# Patient Record
Sex: FEMALE | Race: BLACK OR AFRICAN AMERICAN | NOT HISPANIC OR LATINO | Employment: OTHER | URBAN - METROPOLITAN AREA
[De-identification: names, ages, dates, MRNs, and addresses within clinical notes are randomized per-mention and may not be internally consistent; named-entity substitution may affect disease eponyms.]

---

## 2023-10-12 DIAGNOSIS — R06.2 WHEEZING: Primary | ICD-10-CM

## 2023-10-12 RX ORDER — BUDESONIDE AND FORMOTEROL FUMARATE DIHYDRATE 160; 4.5 UG/1; UG/1
1-2 AEROSOL RESPIRATORY (INHALATION) 2 TIMES DAILY
Qty: 10.2 EACH | Refills: 0 | Status: SHIPPED | OUTPATIENT
Start: 2023-10-12 | End: 2023-10-16 | Stop reason: SDUPTHER

## 2023-10-16 ENCOUNTER — TELEMEDICINE (OUTPATIENT)
Dept: PRIMARY CARE | Facility: CLINIC | Age: 68
End: 2023-10-16
Payer: MEDICARE

## 2023-10-16 DIAGNOSIS — R06.2 WHEEZING: ICD-10-CM

## 2023-10-16 DIAGNOSIS — R73.01 IFG (IMPAIRED FASTING GLUCOSE): ICD-10-CM

## 2023-10-16 DIAGNOSIS — Z23 NEED FOR ZOSTER VACCINE: ICD-10-CM

## 2023-10-16 DIAGNOSIS — Z00.00 ANNUAL PHYSICAL EXAM: ICD-10-CM

## 2023-10-16 DIAGNOSIS — E55.9 HYPOVITAMINOSIS D: ICD-10-CM

## 2023-10-16 DIAGNOSIS — Z78.0 POST-MENOPAUSAL: ICD-10-CM

## 2023-10-16 DIAGNOSIS — J44.9 CHRONIC OBSTRUCTIVE PULMONARY DISEASE, UNSPECIFIED COPD TYPE (MULTI): ICD-10-CM

## 2023-10-16 DIAGNOSIS — I1A.0 RESISTANT HYPERTENSION: ICD-10-CM

## 2023-10-16 DIAGNOSIS — E78.5 HYPERLIPIDEMIA, UNSPECIFIED HYPERLIPIDEMIA TYPE: ICD-10-CM

## 2023-10-16 DIAGNOSIS — I25.10 ASHD (ARTERIOSCLEROTIC HEART DISEASE): Primary | ICD-10-CM

## 2023-10-16 DIAGNOSIS — E11.9 TYPE 2 DIABETES MELLITUS WITHOUT COMPLICATION, WITHOUT LONG-TERM CURRENT USE OF INSULIN (MULTI): ICD-10-CM

## 2023-10-16 DIAGNOSIS — I70.219 EXTREMITY ATHEROSCLEROSIS WITH INTERMITTENT CLAUDICATION (CMS-HCC): ICD-10-CM

## 2023-10-16 PROBLEM — E03.9 HYPOTHYROIDISM: Status: ACTIVE | Noted: 2023-10-16

## 2023-10-16 PROBLEM — M81.0 AGE RELATED OSTEOPOROSIS: Status: ACTIVE | Noted: 2023-10-16

## 2023-10-16 PROBLEM — I10 HYPERTENSION: Status: ACTIVE | Noted: 2023-10-16

## 2023-10-16 PROBLEM — I73.9 PERIPHERAL VASCULAR DISEASE (CMS-HCC): Status: ACTIVE | Noted: 2023-10-16

## 2023-10-16 PROCEDURE — 99214 OFFICE O/P EST MOD 30 MIN: CPT | Performed by: INTERNAL MEDICINE

## 2023-10-16 RX ORDER — CHOLECALCIFEROL (VITAMIN D3) 50 MCG
50 TABLET ORAL DAILY
COMMUNITY

## 2023-10-16 RX ORDER — METOPROLOL TARTRATE 100 MG/1
50 TABLET ORAL 2 TIMES DAILY
COMMUNITY
End: 2023-11-21

## 2023-10-16 RX ORDER — CHLORHEXIDINE GLUCONATE ORAL RINSE 1.2 MG/ML
SOLUTION DENTAL
COMMUNITY
Start: 2023-09-05 | End: 2024-03-29 | Stop reason: ENTERED-IN-ERROR

## 2023-10-16 RX ORDER — DOCUSATE SODIUM 100 MG/1
1 CAPSULE, LIQUID FILLED ORAL DAILY
COMMUNITY
Start: 2013-12-19 | End: 2024-01-16

## 2023-10-16 RX ORDER — IBUPROFEN 800 MG/1
800 TABLET ORAL EVERY 8 HOURS PRN
COMMUNITY
Start: 2023-09-05 | End: 2024-01-16

## 2023-10-16 RX ORDER — CLOPIDOGREL BISULFATE 75 MG/1
1 TABLET ORAL DAILY
COMMUNITY
Start: 2016-08-30 | End: 2024-01-15 | Stop reason: ALTCHOICE

## 2023-10-16 RX ORDER — IPRATROPIUM BROMIDE AND ALBUTEROL SULFATE 2.5; .5 MG/3ML; MG/3ML
3 SOLUTION RESPIRATORY (INHALATION) 4 TIMES DAILY PRN
COMMUNITY
Start: 2017-07-24 | End: 2024-04-04 | Stop reason: HOSPADM

## 2023-10-16 RX ORDER — EZETIMIBE 10 MG/1
1 TABLET ORAL NIGHTLY
COMMUNITY
Start: 2022-03-07 | End: 2024-01-15 | Stop reason: ENTERED-IN-ERROR

## 2023-10-16 RX ORDER — ALBUTEROL SULFATE 90 UG/1
1 AEROSOL, METERED RESPIRATORY (INHALATION) EVERY 6 HOURS PRN
COMMUNITY
End: 2023-10-16 | Stop reason: SDUPTHER

## 2023-10-16 RX ORDER — NITROGLYCERIN 0.4 MG/1
0.4 TABLET SUBLINGUAL EVERY 5 MIN PRN
COMMUNITY
Start: 2014-02-20 | End: 2024-01-16

## 2023-10-16 RX ORDER — BUDESONIDE AND FORMOTEROL FUMARATE DIHYDRATE 160; 4.5 UG/1; UG/1
1-2 AEROSOL RESPIRATORY (INHALATION) 2 TIMES DAILY
Qty: 10.2 EACH | Refills: 0 | Status: SHIPPED | OUTPATIENT
Start: 2023-10-16 | End: 2023-12-05

## 2023-10-16 RX ORDER — ALBUTEROL SULFATE 90 UG/1
1 AEROSOL, METERED RESPIRATORY (INHALATION) EVERY 6 HOURS PRN
Qty: 18 G | Refills: 2 | Status: SHIPPED | OUTPATIENT
Start: 2023-10-16

## 2023-10-16 RX ORDER — LOSARTAN POTASSIUM 50 MG/1
75 TABLET ORAL DAILY
COMMUNITY
End: 2024-01-25 | Stop reason: HOSPADM

## 2023-10-16 ASSESSMENT — ENCOUNTER SYMPTOMS
CARDIOVASCULAR NEGATIVE: 1
GASTROINTESTINAL NEGATIVE: 1
RESPIRATORY NEGATIVE: 1
CONSTITUTIONAL NEGATIVE: 1

## 2023-10-16 NOTE — PROGRESS NOTES
Subjective   Patient ID: Yesy Ayala is a 68 y.o. female who presents for Follow-up.  HPI    Review of Systems   Constitutional: Negative.    Respiratory: Negative.     Cardiovascular: Negative.    Gastrointestinal: Negative.        Objective   Physical Exam  Neurological:      Mental Status: She is alert.   Psychiatric:         Mood and Affect: Mood normal.         Assessment/Plan   Problem List Items Addressed This Visit       ASHD (arteriosclerotic heart disease) - Primary    Relevant Medications    clopidogrel (Plavix) 75 mg tablet    metoprolol tartrate (Lopressor) 100 mg tablet    nitroglycerin (Nitrostat) 0.4 mg SL tablet    albuterol 90 mcg/actuation inhaler    Other Relevant Orders    CBC    Comprehensive Metabolic Panel    TSH with reflex to Free T4 if abnormal    Hemoglobin A1C    Lipid Panel    Vitamin D 25-Hydroxy,Total (for eval of Vitamin D levels)    Albumin, urine, random    COPD (chronic obstructive pulmonary disease) (CMS/[facility])    Diabetes mellitus (CMS/[facility])    Extremity atherosclerosis with intermittent claudication (CMS/[facility])    Hyperlipidemia    Relevant Medications    albuterol 90 mcg/actuation inhaler    Other Relevant Orders    CBC    Comprehensive Metabolic Panel    TSH with reflex to Free T4 if abnormal    Hemoglobin A1C    Lipid Panel    Vitamin D 25-Hydroxy,Total (for eval of Vitamin D levels)    Albumin, urine, random    Hypertension    Relevant Medications    albuterol 90 mcg/actuation inhaler    Other Relevant Orders    CBC    Comprehensive Metabolic Panel    TSH with reflex to Free T4 if abnormal    Hemoglobin A1C    Lipid Panel    Vitamin D 25-Hydroxy,Total (for eval of Vitamin D levels)    Albumin, urine, random     Other Visit Diagnoses       Wheezing        Relevant Medications    budesonide-formoteroL (Symbicort) 160-4.5 mcg/actuation inhaler    albuterol 90 mcg/actuation inhaler    Other Relevant Orders    CBC    Comprehensive Metabolic Panel    TSH with reflex to Free T4 if  abnormal    Hemoglobin A1C    Lipid Panel    Vitamin D 25-Hydroxy,Total (for eval of Vitamin D levels)    Albumin, urine, random    IFG (impaired fasting glucose)        Relevant Orders    Hemoglobin A1C    Referral to Ophthalmology    Hypovitaminosis D        Relevant Orders    Vitamin D 25-Hydroxy,Total (for eval of Vitamin D levels)    Annual physical exam        Relevant Orders    Colonoscopy Screening    Hepatitis C antibody    XR DEXA bone density    Post-menopausal        Relevant Orders    XR DEXA bone density    Need for zoster vaccine              The patient is here for a follow up on chronic medical problems.  His medications were reviewed, pharmacy updated, notes reviewed, prior labs reviewed.      COPD  Stable  On Symbicort and Albuterol  Refills sent    DM  Due for repeat A1C  Not on any meds  Podiatry referral and opth referral given    PAD  Asymptomatic now    Hld  Check statins    Due for wellness    An interactive audio and video telecommunication system which permits real time communications between the patient (at the originating site) and provider (at the distant site) was utilized to provide this telehealth service.    Verbal consent was requested and obtained from the patient on the day of encounter.         Cydney Moore MD

## 2023-11-02 ENCOUNTER — LAB (OUTPATIENT)
Dept: LAB | Facility: LAB | Age: 68
End: 2023-11-02
Payer: MEDICARE

## 2023-11-02 DIAGNOSIS — I25.10 ASHD (ARTERIOSCLEROTIC HEART DISEASE): ICD-10-CM

## 2023-11-02 DIAGNOSIS — Z00.00 ANNUAL PHYSICAL EXAM: ICD-10-CM

## 2023-11-02 DIAGNOSIS — E55.9 HYPOVITAMINOSIS D: ICD-10-CM

## 2023-11-02 DIAGNOSIS — R06.2 WHEEZING: ICD-10-CM

## 2023-11-02 DIAGNOSIS — R73.01 IFG (IMPAIRED FASTING GLUCOSE): ICD-10-CM

## 2023-11-02 DIAGNOSIS — E78.5 HYPERLIPIDEMIA, UNSPECIFIED HYPERLIPIDEMIA TYPE: ICD-10-CM

## 2023-11-02 DIAGNOSIS — I1A.0 RESISTANT HYPERTENSION: ICD-10-CM

## 2023-11-02 DIAGNOSIS — D64.9 ANEMIA, UNSPECIFIED TYPE: ICD-10-CM

## 2023-11-02 LAB
ALBUMIN SERPL BCP-MCNC: 3.6 G/DL (ref 3.4–5)
ALP SERPL-CCNC: 78 U/L (ref 33–136)
ALT SERPL W P-5'-P-CCNC: 11 U/L (ref 7–45)
ANION GAP SERPL CALC-SCNC: 12 MMOL/L (ref 10–20)
AST SERPL W P-5'-P-CCNC: 11 U/L (ref 9–39)
BILIRUB SERPL-MCNC: 0.6 MG/DL (ref 0–1.2)
BUN SERPL-MCNC: 11 MG/DL (ref 6–23)
CALCIUM SERPL-MCNC: 8.6 MG/DL (ref 8.6–10.3)
CHLORIDE SERPL-SCNC: 105 MMOL/L (ref 98–107)
CHOLEST SERPL-MCNC: 114 MG/DL (ref 0–199)
CHOLESTEROL/HDL RATIO: 2.6
CO2 SERPL-SCNC: 25 MMOL/L (ref 21–32)
CREAT SERPL-MCNC: 1.35 MG/DL (ref 0.5–1.05)
ERYTHROCYTE [DISTWIDTH] IN BLOOD BY AUTOMATED COUNT: 19.7 % (ref 11.5–14.5)
GFR SERPL CREATININE-BSD FRML MDRD: 43 ML/MIN/1.73M*2
GLUCOSE SERPL-MCNC: 223 MG/DL (ref 74–99)
HCT VFR BLD AUTO: 39.1 % (ref 36–46)
HDLC SERPL-MCNC: 43.8 MG/DL
HGB BLD-MCNC: 12.4 G/DL (ref 12–16)
LDLC SERPL CALC-MCNC: 50 MG/DL
MCH RBC QN AUTO: 21.2 PG (ref 26–34)
MCHC RBC AUTO-ENTMCNC: 31.7 G/DL (ref 32–36)
MCV RBC AUTO: 67 FL (ref 80–100)
NON HDL CHOLESTEROL: 70 MG/DL (ref 0–149)
NRBC BLD-RTO: 0 /100 WBCS (ref 0–0)
PLATELET # BLD AUTO: 112 X10*3/UL (ref 150–450)
POTASSIUM SERPL-SCNC: 4.2 MMOL/L (ref 3.5–5.3)
PROT SERPL-MCNC: 6.1 G/DL (ref 6.4–8.2)
RBC # BLD AUTO: 5.86 X10*6/UL (ref 4–5.2)
SODIUM SERPL-SCNC: 138 MMOL/L (ref 136–145)
TRIGL SERPL-MCNC: 103 MG/DL (ref 0–149)
TSH SERPL-ACNC: 2.93 MIU/L (ref 0.44–3.98)
VLDL: 21 MG/DL (ref 0–40)
WBC # BLD AUTO: 8.7 X10*3/UL (ref 4.4–11.3)

## 2023-11-02 PROCEDURE — 82306 VITAMIN D 25 HYDROXY: CPT

## 2023-11-02 PROCEDURE — 36415 COLL VENOUS BLD VENIPUNCTURE: CPT

## 2023-11-02 PROCEDURE — 80061 LIPID PANEL: CPT

## 2023-11-02 PROCEDURE — 80053 COMPREHEN METABOLIC PANEL: CPT

## 2023-11-02 PROCEDURE — 86803 HEPATITIS C AB TEST: CPT

## 2023-11-02 PROCEDURE — 82607 VITAMIN B-12: CPT

## 2023-11-02 PROCEDURE — 82728 ASSAY OF FERRITIN: CPT

## 2023-11-02 PROCEDURE — 85027 COMPLETE CBC AUTOMATED: CPT

## 2023-11-02 PROCEDURE — 83036 HEMOGLOBIN GLYCOSYLATED A1C: CPT

## 2023-11-02 PROCEDURE — 84443 ASSAY THYROID STIM HORMONE: CPT

## 2023-11-03 LAB
25(OH)D3 SERPL-MCNC: 29 NG/ML (ref 30–100)
CREAT UR-MCNC: 47.1 MG/DL (ref 20–320)
EST. AVERAGE GLUCOSE BLD GHB EST-MCNC: 220 MG/DL
HBA1C MFR BLD: 9.3 %
HCV AB SER QL: NONREACTIVE
MICROALBUMIN UR-MCNC: 57.7 MG/L
MICROALBUMIN/CREAT UR: 122.5 UG/MG CREAT

## 2023-11-03 PROCEDURE — 82043 UR ALBUMIN QUANTITATIVE: CPT

## 2023-11-03 PROCEDURE — 82570 ASSAY OF URINE CREATININE: CPT

## 2023-11-06 ENCOUNTER — OFFICE VISIT (OUTPATIENT)
Dept: PRIMARY CARE | Facility: CLINIC | Age: 68
End: 2023-11-06
Payer: MEDICARE

## 2023-11-06 VITALS — WEIGHT: 126 LBS | DIASTOLIC BLOOD PRESSURE: 70 MMHG | SYSTOLIC BLOOD PRESSURE: 128 MMHG | BODY MASS INDEX: 26.33 KG/M2

## 2023-11-06 DIAGNOSIS — E11.22 TYPE 2 DIABETES MELLITUS WITH STAGE 3A CHRONIC KIDNEY DISEASE, WITHOUT LONG-TERM CURRENT USE OF INSULIN (MULTI): Primary | ICD-10-CM

## 2023-11-06 DIAGNOSIS — I1A.0 RESISTANT HYPERTENSION: ICD-10-CM

## 2023-11-06 DIAGNOSIS — E55.9 VITAMIN D DEFICIENCY: ICD-10-CM

## 2023-11-06 DIAGNOSIS — F17.200 NICOTINE USE DISORDER: ICD-10-CM

## 2023-11-06 DIAGNOSIS — N18.31 TYPE 2 DIABETES MELLITUS WITH STAGE 3A CHRONIC KIDNEY DISEASE, WITHOUT LONG-TERM CURRENT USE OF INSULIN (MULTI): Primary | ICD-10-CM

## 2023-11-06 DIAGNOSIS — E78.5 HYPERLIPIDEMIA, UNSPECIFIED HYPERLIPIDEMIA TYPE: ICD-10-CM

## 2023-11-06 DIAGNOSIS — N18.30 STAGE 3 CHRONIC KIDNEY DISEASE, UNSPECIFIED WHETHER STAGE 3A OR 3B CKD (MULTI): ICD-10-CM

## 2023-11-06 DIAGNOSIS — D64.9 ANEMIA, UNSPECIFIED TYPE: ICD-10-CM

## 2023-11-06 LAB
FERRITIN SERPL-MCNC: 104 NG/ML (ref 8–150)
VIT B12 SERPL-MCNC: 263 PG/ML (ref 211–911)

## 2023-11-06 PROCEDURE — 3048F LDL-C <100 MG/DL: CPT | Performed by: INTERNAL MEDICINE

## 2023-11-06 PROCEDURE — 1159F MED LIST DOCD IN RCRD: CPT | Performed by: INTERNAL MEDICINE

## 2023-11-06 PROCEDURE — 1036F TOBACCO NON-USER: CPT | Performed by: INTERNAL MEDICINE

## 2023-11-06 PROCEDURE — 3074F SYST BP LT 130 MM HG: CPT | Performed by: INTERNAL MEDICINE

## 2023-11-06 PROCEDURE — 2028F FOOT EXAM PERFORMED: CPT | Performed by: INTERNAL MEDICINE

## 2023-11-06 PROCEDURE — G0439 PPPS, SUBSEQ VISIT: HCPCS | Performed by: INTERNAL MEDICINE

## 2023-11-06 PROCEDURE — 1170F FXNL STATUS ASSESSED: CPT | Performed by: INTERNAL MEDICINE

## 2023-11-06 PROCEDURE — 3078F DIAST BP <80 MM HG: CPT | Performed by: INTERNAL MEDICINE

## 2023-11-06 PROCEDURE — 1160F RVW MEDS BY RX/DR IN RCRD: CPT | Performed by: INTERNAL MEDICINE

## 2023-11-06 PROCEDURE — 3046F HEMOGLOBIN A1C LEVEL >9.0%: CPT | Performed by: INTERNAL MEDICINE

## 2023-11-06 PROCEDURE — 4010F ACE/ARB THERAPY RXD/TAKEN: CPT | Performed by: INTERNAL MEDICINE

## 2023-11-06 PROCEDURE — 99214 OFFICE O/P EST MOD 30 MIN: CPT | Performed by: INTERNAL MEDICINE

## 2023-11-06 PROCEDURE — 3060F POS MICROALBUMINURIA REV: CPT | Performed by: INTERNAL MEDICINE

## 2023-11-06 RX ORDER — IBUPROFEN 200 MG
1 TABLET ORAL EVERY 24 HOURS
Qty: 30 PATCH | Refills: 0 | Status: SHIPPED | OUTPATIENT
Start: 2023-11-06 | End: 2024-01-25 | Stop reason: HOSPADM

## 2023-11-06 ASSESSMENT — ACTIVITIES OF DAILY LIVING (ADL)
TAKING_MEDICATION: INDEPENDENT
DRESSING: INDEPENDENT
GROCERY_SHOPPING: INDEPENDENT
BATHING: INDEPENDENT
MANAGING_FINANCES: INDEPENDENT
DOING_HOUSEWORK: INDEPENDENT

## 2023-11-06 ASSESSMENT — LIFESTYLE VARIABLES
HOW MANY STANDARD DRINKS CONTAINING ALCOHOL DO YOU HAVE ON A TYPICAL DAY: PATIENT DOES NOT DRINK
HOW OFTEN DO YOU HAVE SIX OR MORE DRINKS ON ONE OCCASION: NEVER
SKIP TO QUESTIONS 9-10: 1
AUDIT-C TOTAL SCORE: 0
HOW OFTEN DO YOU HAVE A DRINK CONTAINING ALCOHOL: NEVER

## 2023-11-06 ASSESSMENT — ENCOUNTER SYMPTOMS
DEPRESSION: 0
LOSS OF SENSATION IN FEET: 0
OCCASIONAL FEELINGS OF UNSTEADINESS: 0

## 2023-11-06 ASSESSMENT — PATIENT HEALTH QUESTIONNAIRE - PHQ9
1. LITTLE INTEREST OR PLEASURE IN DOING THINGS: NOT AT ALL
SUM OF ALL RESPONSES TO PHQ9 QUESTIONS 1 AND 2: 0
1. LITTLE INTEREST OR PLEASURE IN DOING THINGS: NOT AT ALL
2. FEELING DOWN, DEPRESSED OR HOPELESS: NOT AT ALL
2. FEELING DOWN, DEPRESSED OR HOPELESS: NOT AT ALL
SUM OF ALL RESPONSES TO PHQ9 QUESTIONS 1 AND 2: 0

## 2023-11-06 NOTE — PROGRESS NOTES
.Subjective   Patient ID: Yesy Ayala is a 68 y.o. female who presents for Follow-up.    HPI     Review of Systems    Objective   /70   Wt 57.2 kg (126 lb)   BMI 26.33 kg/m²     Physical Exam    Assessment/Plan   {Assess/PlanSmartLinks:83005}

## 2023-11-06 NOTE — PROGRESS NOTES
Medicare Wellness Billing Compliance Satisfied    *This is a visual tool to show completion of required items on the day of the visit. Green checks will only appear on the date of visit.    Review all medications by prescribing practitioner or clinical pharmacist (such as prescriptions, OTCs, herbal therapies and supplements) documented in the medical record    Past Medical, Surgical, and Family History reviewed and updated in chart    Tobacco Use Reviewed    Alcohol Use Reviewed    Illicit Drug Use Reviewed    PHQ2/9    Falls in Last Year Reviewed    Home Safety Risk Factors Reviewed    Cognitive Impairment Reviewed    Patient Self Assessment and Health Status    Current Diet Reviewed    Exercise Frequency    ADL - Hearing Impairment    ADL - Bathing    ADL - Dressing    ADL - Walks in Home    IADL - Managing Finances    IADL - Grocery Shopping    IADL - Taking Medications    IADL - Doing Housework    Subjective   Patient ID: Yesy Ayala is a 68 y.o. female who presents for Follow-up.    HPI   68-year-old female with past medical history of hypertension, coronary artery disease, PVD s/p stent placement, who presented to the office for Medicare wellness visit.  Patient reported that she has been in a good health since her last visit she denies any, recent illness, hospitalization, or any new complaints, she denies any hematuria, or blood with her stool, she also denies being diagnosed with thalassemia in the past.  He is a still active smoker used to smoke 1 pack/day but now smokes 1 pack every 3 to 4 days she initially consider Chantix but was worried about the side effects which include hallucination especially that she lives alone at home, but now she is willing to try nicotine patches.  She is planning to obtain her colonoscopy and she is up-to-date with her other requirement    Review of Systems   All other systems reviewed and are negative.      Objective   /70   Wt 57.2 kg (126 lb)    BMI 26.33 kg/m²     Physical Exam  Vitals and nursing note reviewed.   Constitutional:       Appearance: Normal appearance. She is normal weight.   HENT:      Head: Normocephalic and atraumatic.      Right Ear: Tympanic membrane normal.      Nose: Nose normal.      Mouth/Throat:      Mouth: Mucous membranes are moist.   Eyes:      Extraocular Movements: Extraocular movements intact.   Cardiovascular:      Rate and Rhythm: Normal rate.      Pulses: Normal pulses.      Heart sounds: Normal heart sounds.   Pulmonary:      Effort: Pulmonary effort is normal.      Breath sounds: Normal breath sounds.   Abdominal:      General: Abdomen is flat. Bowel sounds are normal.      Palpations: Abdomen is soft.   Musculoskeletal:         General: Normal range of motion.   Skin:     General: Skin is warm and dry.      Capillary Refill: Capillary refill takes less than 2 seconds.   Neurological:      General: No focal deficit present.      Mental Status: She is alert and oriented to person, place, and time.   Psychiatric:         Mood and Affect: Mood normal.         Behavior: Behavior normal.         Thought Content: Thought content normal.         Judgment: Judgment normal.         Assessment/Plan   Diagnoses and all orders for this visit:  Type 2 diabetes mellitus with stage 3a chronic kidney disease, without long-term current use of insulin (CMS/[facility])  -     empagliflozin (Jardiance) 10 mg; Take 1 tablet (10 mg) by mouth once daily.  -     Referral to Nephrology; Future  Resistant hypertension  Hyperlipidemia, unspecified hyperlipidemia type  Vitamin D deficiency  Nicotine use disorder  -     nicotine (Nicoderm CQ) 21 mg/24 hr patch; Place 1 patch over 24 hours on the skin once every 24 hours.  Anemia, unspecified type r/o underlying thalassemia   -     Iron and TIBC; Future  -     Ferritin; Future  -     Vitamin B12; Future  -     Copper, serum; Future  Stage 3 chronic kidney disease, unspecified whether stage 3a or 3b CKD  (CMS/HCC)  -     Iron and TIBC; Future  -     Referral to Nephrology; Future

## 2023-11-06 NOTE — PROGRESS NOTES
I saw and evaluated the patient. I personally obtained the key and critical portions of the history and physical exam or was physically present for key and critical portions performed by the resident/fellow. I reviewed the resident/fellow's documentation and discussed the patient with the resident/fellow. I agree with the resident/fellow's medical decision making as documented in the note.    Cydney Moore MD

## 2023-11-20 DIAGNOSIS — I25.10 ATHEROSCLEROTIC HEART DISEASE OF NATIVE CORONARY ARTERY WITHOUT ANGINA PECTORIS: ICD-10-CM

## 2023-11-20 DIAGNOSIS — E78.5 HYPERLIPIDEMIA, UNSPECIFIED: Primary | ICD-10-CM

## 2023-11-21 RX ORDER — METOPROLOL TARTRATE 100 MG/1
50 TABLET ORAL 2 TIMES DAILY
Qty: 45 TABLET | Refills: 3 | Status: SHIPPED | OUTPATIENT
Start: 2023-11-21

## 2023-11-21 RX ORDER — ROSUVASTATIN CALCIUM 40 MG/1
40 TABLET, COATED ORAL NIGHTLY
COMMUNITY
End: 2024-01-16

## 2023-11-21 RX ORDER — ROSUVASTATIN CALCIUM 40 MG/1
40 TABLET, COATED ORAL NIGHTLY
Qty: 90 TABLET | Refills: 3 | Status: SHIPPED | OUTPATIENT
Start: 2023-11-21 | End: 2024-04-04 | Stop reason: HOSPADM

## 2023-11-30 ENCOUNTER — APPOINTMENT (OUTPATIENT)
Dept: PRIMARY CARE | Facility: CLINIC | Age: 68
End: 2023-11-30

## 2023-12-05 DIAGNOSIS — R06.2 WHEEZING: ICD-10-CM

## 2023-12-05 RX ORDER — BUDESONIDE AND FORMOTEROL FUMARATE DIHYDRATE 160; 4.5 UG/1; UG/1
AEROSOL RESPIRATORY (INHALATION)
Qty: 10.2 EACH | Refills: 0 | Status: ON HOLD | OUTPATIENT
Start: 2023-12-05 | End: 2024-01-29 | Stop reason: SDUPTHER

## 2023-12-14 ENCOUNTER — CLINICAL SUPPORT (OUTPATIENT)
Dept: PRIMARY CARE | Facility: CLINIC | Age: 68
End: 2023-12-14
Payer: MEDICARE

## 2023-12-14 DIAGNOSIS — Z00.00 PREVENTATIVE HEALTH CARE: ICD-10-CM

## 2023-12-14 DIAGNOSIS — E53.8 VITAMIN B12 DEFICIENCY: ICD-10-CM

## 2023-12-14 PROCEDURE — 96372 THER/PROPH/DIAG INJ SC/IM: CPT | Performed by: INTERNAL MEDICINE

## 2023-12-14 RX ORDER — CYANOCOBALAMIN 1000 UG/ML
1000 INJECTION, SOLUTION INTRAMUSCULAR; SUBCUTANEOUS ONCE
Status: COMPLETED | OUTPATIENT
Start: 2023-12-14 | End: 2023-12-14

## 2023-12-14 RX ADMIN — CYANOCOBALAMIN 1000 MCG: 1000 INJECTION, SOLUTION INTRAMUSCULAR; SUBCUTANEOUS at 13:13

## 2024-01-15 ENCOUNTER — APPOINTMENT (OUTPATIENT)
Dept: RADIOLOGY | Facility: HOSPITAL | Age: 69
End: 2024-01-15
Payer: COMMERCIAL

## 2024-01-15 ENCOUNTER — HOSPITAL ENCOUNTER (INPATIENT)
Facility: HOSPITAL | Age: 69
LOS: 9 days | Discharge: NURSING, REHAB OR LONG-TERM CARE | End: 2024-01-25
Attending: EMERGENCY MEDICINE | Admitting: INTERNAL MEDICINE
Payer: COMMERCIAL

## 2024-01-15 ENCOUNTER — APPOINTMENT (OUTPATIENT)
Dept: CARDIOLOGY | Facility: HOSPITAL | Age: 69
End: 2024-01-15
Payer: COMMERCIAL

## 2024-01-15 DIAGNOSIS — J44.1 COPD EXACERBATION (MULTI): ICD-10-CM

## 2024-01-15 DIAGNOSIS — N18.31 STAGE 3A CHRONIC KIDNEY DISEASE (MULTI): Chronic | ICD-10-CM

## 2024-01-15 DIAGNOSIS — J21.0 RSV (ACUTE BRONCHIOLITIS DUE TO RESPIRATORY SYNCYTIAL VIRUS): Primary | ICD-10-CM

## 2024-01-15 DIAGNOSIS — R60.1 GENERALIZED EDEMA: ICD-10-CM

## 2024-01-15 DIAGNOSIS — E11.65 TYPE 2 DIABETES MELLITUS WITH HYPERGLYCEMIA, WITHOUT LONG-TERM CURRENT USE OF INSULIN (MULTI): Chronic | ICD-10-CM

## 2024-01-15 DIAGNOSIS — I1A.0 RESISTANT HYPERTENSION: Chronic | ICD-10-CM

## 2024-01-15 DIAGNOSIS — J43.9 PULMONARY EMPHYSEMA, UNSPECIFIED EMPHYSEMA TYPE (MULTI): ICD-10-CM

## 2024-01-15 LAB
ALBUMIN SERPL BCP-MCNC: 3.8 G/DL (ref 3.4–5)
ALP SERPL-CCNC: 106 U/L (ref 33–136)
ALT SERPL W P-5'-P-CCNC: 15 U/L (ref 7–45)
ANION GAP BLDV CALCULATED.4IONS-SCNC: 8 MMOL/L (ref 10–25)
ANION GAP SERPL CALC-SCNC: 13 MMOL/L (ref 10–20)
APTT PPP: 27 SECONDS (ref 27–38)
AST SERPL W P-5'-P-CCNC: 20 U/L (ref 9–39)
BASE EXCESS BLDV CALC-SCNC: 3 MMOL/L (ref -2–3)
BASOPHILS # BLD MANUAL: 0 X10*3/UL (ref 0–0.1)
BASOPHILS NFR BLD MANUAL: 0 %
BILIRUB SERPL-MCNC: 1 MG/DL (ref 0–1.2)
BODY TEMPERATURE: 37 DEGREES CELSIUS
BUN SERPL-MCNC: 14 MG/DL (ref 6–23)
BURR CELLS BLD QL SMEAR: NORMAL
CA-I BLDV-SCNC: 1.25 MMOL/L (ref 1.1–1.33)
CALCIUM SERPL-MCNC: 9.2 MG/DL (ref 8.6–10.3)
CARDIAC TROPONIN I PNL SERPL HS: 46 NG/L (ref 0–13)
CHLORIDE BLDV-SCNC: 104 MMOL/L (ref 98–107)
CHLORIDE SERPL-SCNC: 103 MMOL/L (ref 98–107)
CO2 SERPL-SCNC: 27 MMOL/L (ref 21–32)
CREAT SERPL-MCNC: 1.65 MG/DL (ref 0.5–1.05)
EGFRCR SERPLBLD CKD-EPI 2021: 34 ML/MIN/1.73M*2
EOSINOPHIL # BLD MANUAL: 0 X10*3/UL (ref 0–0.7)
EOSINOPHIL NFR BLD MANUAL: 0 %
ERYTHROCYTE [DISTWIDTH] IN BLOOD BY AUTOMATED COUNT: 19.8 % (ref 11.5–14.5)
FLUAV RNA RESP QL NAA+PROBE: NOT DETECTED
FLUBV RNA RESP QL NAA+PROBE: NOT DETECTED
GLUCOSE BLDV-MCNC: 354 MG/DL (ref 74–99)
GLUCOSE SERPL-MCNC: 318 MG/DL (ref 74–99)
HCO3 BLDV-SCNC: 29.5 MMOL/L (ref 22–26)
HCT VFR BLD AUTO: 45.6 % (ref 36–46)
HCT VFR BLD EST: 44 % (ref 36–46)
HGB BLD-MCNC: 14.5 G/DL (ref 12–16)
HGB BLDV-MCNC: 14.8 G/DL (ref 12–16)
IMM GRANULOCYTES # BLD AUTO: 0.02 X10*3/UL (ref 0–0.7)
IMM GRANULOCYTES NFR BLD AUTO: 0.2 % (ref 0–0.9)
INHALED O2 CONCENTRATION: 21 %
INR PPP: 1.1 (ref 0.9–1.1)
LACTATE BLDV-SCNC: 2.9 MMOL/L (ref 0.4–2)
LACTATE BLDV-SCNC: 3.5 MMOL/L (ref 0.4–2)
LYMPHOCYTES # BLD MANUAL: 2.75 X10*3/UL (ref 1.2–4.8)
LYMPHOCYTES NFR BLD MANUAL: 34 %
MCH RBC QN AUTO: 21.3 PG (ref 26–34)
MCHC RBC AUTO-ENTMCNC: 31.8 G/DL (ref 32–36)
MCV RBC AUTO: 67 FL (ref 80–100)
MONOCYTES # BLD MANUAL: 0.16 X10*3/UL (ref 0.1–1)
MONOCYTES NFR BLD MANUAL: 2 %
NEUTS SEG # BLD MANUAL: 4.86 X10*3/UL (ref 1.2–7)
NEUTS SEG NFR BLD MANUAL: 60 %
NRBC BLD-RTO: 0 /100 WBCS (ref 0–0)
OVALOCYTES BLD QL SMEAR: NORMAL
OXYHGB MFR BLDV: 22.3 % (ref 45–75)
PCO2 BLDV: 51 MM HG (ref 41–51)
PH BLDV: 7.37 PH (ref 7.33–7.43)
PLATELET # BLD AUTO: 65 X10*3/UL (ref 150–450)
PO2 BLDV: 25 MM HG (ref 35–45)
POTASSIUM BLDV-SCNC: 3.8 MMOL/L (ref 3.5–5.3)
POTASSIUM SERPL-SCNC: 3.5 MMOL/L (ref 3.5–5.3)
PROT SERPL-MCNC: 7.1 G/DL (ref 6.4–8.2)
PROTHROMBIN TIME: 12.1 SECONDS (ref 9.8–12.8)
RBC # BLD AUTO: 6.82 X10*6/UL (ref 4–5.2)
RBC MORPH BLD: NORMAL
RSV RNA RESP QL NAA+PROBE: DETECTED
SAO2 % BLDV: 23 % (ref 45–75)
SARS-COV-2 RNA RESP QL NAA+PROBE: NOT DETECTED
SODIUM BLDV-SCNC: 138 MMOL/L (ref 136–145)
SODIUM SERPL-SCNC: 139 MMOL/L (ref 136–145)
TARGETS BLD QL SMEAR: NORMAL
TOTAL CELLS COUNTED BLD: 100
VARIANT LYMPHS # BLD MANUAL: 0.32 X10*3/UL (ref 0–0.5)
VARIANT LYMPHS NFR BLD: 4 %
WBC # BLD AUTO: 8.1 X10*3/UL (ref 4.4–11.3)

## 2024-01-15 PROCEDURE — 94640 AIRWAY INHALATION TREATMENT: CPT

## 2024-01-15 PROCEDURE — 84484 ASSAY OF TROPONIN QUANT: CPT

## 2024-01-15 PROCEDURE — 93005 ELECTROCARDIOGRAM TRACING: CPT

## 2024-01-15 PROCEDURE — 85610 PROTHROMBIN TIME: CPT

## 2024-01-15 PROCEDURE — 96365 THER/PROPH/DIAG IV INF INIT: CPT

## 2024-01-15 PROCEDURE — 84132 ASSAY OF SERUM POTASSIUM: CPT

## 2024-01-15 PROCEDURE — 87637 SARSCOV2&INF A&B&RSV AMP PRB: CPT | Performed by: EMERGENCY MEDICINE

## 2024-01-15 PROCEDURE — 85007 BL SMEAR W/DIFF WBC COUNT: CPT

## 2024-01-15 PROCEDURE — 2500000002 HC RX 250 W HCPCS SELF ADMINISTERED DRUGS (ALT 637 FOR MEDICARE OP, ALT 636 FOR OP/ED): Performed by: PHYSICIAN ASSISTANT

## 2024-01-15 PROCEDURE — 96375 TX/PRO/DX INJ NEW DRUG ADDON: CPT

## 2024-01-15 PROCEDURE — 36415 COLL VENOUS BLD VENIPUNCTURE: CPT

## 2024-01-15 PROCEDURE — G0378 HOSPITAL OBSERVATION PER HR: HCPCS

## 2024-01-15 PROCEDURE — 2500000004 HC RX 250 GENERAL PHARMACY W/ HCPCS (ALT 636 FOR OP/ED): Performed by: PHYSICIAN ASSISTANT

## 2024-01-15 PROCEDURE — 99285 EMERGENCY DEPT VISIT HI MDM: CPT | Performed by: EMERGENCY MEDICINE

## 2024-01-15 PROCEDURE — 85730 THROMBOPLASTIN TIME PARTIAL: CPT

## 2024-01-15 PROCEDURE — 2500000002 HC RX 250 W HCPCS SELF ADMINISTERED DRUGS (ALT 637 FOR MEDICARE OP, ALT 636 FOR OP/ED)

## 2024-01-15 PROCEDURE — 85027 COMPLETE CBC AUTOMATED: CPT

## 2024-01-15 PROCEDURE — 71046 X-RAY EXAM CHEST 2 VIEWS: CPT | Mod: FR

## 2024-01-15 PROCEDURE — 2500000004 HC RX 250 GENERAL PHARMACY W/ HCPCS (ALT 636 FOR OP/ED)

## 2024-01-15 PROCEDURE — 83605 ASSAY OF LACTIC ACID: CPT

## 2024-01-15 PROCEDURE — 99222 1ST HOSP IP/OBS MODERATE 55: CPT | Performed by: PHYSICIAN ASSISTANT

## 2024-01-15 PROCEDURE — 71046 X-RAY EXAM CHEST 2 VIEWS: CPT | Mod: FOREIGN READ | Performed by: RADIOLOGY

## 2024-01-15 RX ORDER — TALC
3 POWDER (GRAM) TOPICAL
Status: DISCONTINUED | OUTPATIENT
Start: 2024-01-16 | End: 2024-01-25 | Stop reason: HOSPADM

## 2024-01-15 RX ORDER — ENOXAPARIN SODIUM 100 MG/ML
30 INJECTION SUBCUTANEOUS EVERY 24 HOURS
Status: DISCONTINUED | OUTPATIENT
Start: 2024-01-15 | End: 2024-01-25 | Stop reason: HOSPADM

## 2024-01-15 RX ORDER — GUAIFENESIN 600 MG/1
600 TABLET, EXTENDED RELEASE ORAL 2 TIMES DAILY
Status: DISCONTINUED | OUTPATIENT
Start: 2024-01-15 | End: 2024-01-25 | Stop reason: HOSPADM

## 2024-01-15 RX ORDER — DOCUSATE SODIUM 100 MG/1
100 CAPSULE, LIQUID FILLED ORAL 2 TIMES DAILY PRN
Status: DISCONTINUED | OUTPATIENT
Start: 2024-01-15 | End: 2024-01-25 | Stop reason: HOSPADM

## 2024-01-15 RX ORDER — PANTOPRAZOLE SODIUM 40 MG/10ML
40 INJECTION, POWDER, LYOPHILIZED, FOR SOLUTION INTRAVENOUS
Status: DISCONTINUED | OUTPATIENT
Start: 2024-01-16 | End: 2024-01-25 | Stop reason: HOSPADM

## 2024-01-15 RX ORDER — ALBUTEROL SULFATE 0.83 MG/ML
2.5 SOLUTION RESPIRATORY (INHALATION) EVERY 4 HOURS PRN
Status: DISCONTINUED | OUTPATIENT
Start: 2024-01-15 | End: 2024-01-15

## 2024-01-15 RX ORDER — ALBUTEROL SULFATE 0.83 MG/ML
2.5 SOLUTION RESPIRATORY (INHALATION) EVERY 20 MIN
Status: COMPLETED | OUTPATIENT
Start: 2024-01-15 | End: 2024-01-15

## 2024-01-15 RX ORDER — DEXTROSE 50 % IN WATER (D50W) INTRAVENOUS SYRINGE
25
Status: DISCONTINUED | OUTPATIENT
Start: 2024-01-15 | End: 2024-01-16

## 2024-01-15 RX ORDER — IPRATROPIUM BROMIDE AND ALBUTEROL SULFATE 2.5; .5 MG/3ML; MG/3ML
3 SOLUTION RESPIRATORY (INHALATION)
Status: DISCONTINUED | OUTPATIENT
Start: 2024-01-16 | End: 2024-01-15

## 2024-01-15 RX ORDER — ACETAMINOPHEN 325 MG/1
950 TABLET ORAL EVERY 6 HOURS PRN
Status: DISCONTINUED | OUTPATIENT
Start: 2024-01-15 | End: 2024-01-25 | Stop reason: HOSPADM

## 2024-01-15 RX ORDER — PANTOPRAZOLE SODIUM 40 MG/1
40 TABLET, DELAYED RELEASE ORAL
Status: DISCONTINUED | OUTPATIENT
Start: 2024-01-16 | End: 2024-01-25 | Stop reason: HOSPADM

## 2024-01-15 RX ORDER — INSULIN LISPRO 100 [IU]/ML
0-5 INJECTION, SOLUTION INTRAVENOUS; SUBCUTANEOUS
Status: DISCONTINUED | OUTPATIENT
Start: 2024-01-16 | End: 2024-01-16

## 2024-01-15 RX ORDER — ALBUTEROL SULFATE 0.83 MG/ML
2.5 SOLUTION RESPIRATORY (INHALATION) EVERY 2 HOUR PRN
Status: DISCONTINUED | OUTPATIENT
Start: 2024-01-15 | End: 2024-01-25 | Stop reason: HOSPADM

## 2024-01-15 RX ORDER — MAGNESIUM SULFATE HEPTAHYDRATE 40 MG/ML
2 INJECTION, SOLUTION INTRAVENOUS ONCE
Status: COMPLETED | OUTPATIENT
Start: 2024-01-15 | End: 2024-01-15

## 2024-01-15 RX ORDER — BUDESONIDE 0.5 MG/2ML
0.5 INHALANT ORAL
Status: DISCONTINUED | OUTPATIENT
Start: 2024-01-15 | End: 2024-01-25 | Stop reason: HOSPADM

## 2024-01-15 RX ORDER — BENZONATATE 100 MG/1
100 CAPSULE ORAL 3 TIMES DAILY PRN
Status: DISCONTINUED | OUTPATIENT
Start: 2024-01-15 | End: 2024-01-25 | Stop reason: HOSPADM

## 2024-01-15 RX ORDER — PREDNISONE 20 MG/1
40 TABLET ORAL DAILY
Status: DISCONTINUED | OUTPATIENT
Start: 2024-01-16 | End: 2024-01-16

## 2024-01-15 RX ORDER — IPRATROPIUM BROMIDE AND ALBUTEROL SULFATE 2.5; .5 MG/3ML; MG/3ML
3 SOLUTION RESPIRATORY (INHALATION)
Status: DISCONTINUED | OUTPATIENT
Start: 2024-01-16 | End: 2024-01-17

## 2024-01-15 RX ORDER — IPRATROPIUM BROMIDE AND ALBUTEROL SULFATE 2.5; .5 MG/3ML; MG/3ML
3 SOLUTION RESPIRATORY (INHALATION) EVERY 20 MIN
Status: COMPLETED | OUTPATIENT
Start: 2024-01-15 | End: 2024-01-15

## 2024-01-15 RX ORDER — DEXTROSE MONOHYDRATE 100 MG/ML
30 INJECTION, SOLUTION INTRAVENOUS ONCE AS NEEDED
Status: DISCONTINUED | OUTPATIENT
Start: 2024-01-15 | End: 2024-01-25 | Stop reason: HOSPADM

## 2024-01-15 RX ADMIN — ALBUTEROL SULFATE 2.5 MG: 2.5 SOLUTION RESPIRATORY (INHALATION) at 23:07

## 2024-01-15 RX ADMIN — METHYLPREDNISOLONE SODIUM SUCCINATE 125 MG: 125 INJECTION, POWDER, FOR SOLUTION INTRAMUSCULAR; INTRAVENOUS at 17:54

## 2024-01-15 RX ADMIN — Medication 2.5 MG: at 18:45

## 2024-01-15 RX ADMIN — Medication 2.5 MG: at 18:48

## 2024-01-15 RX ADMIN — BUDESONIDE 0.5 MG: 0.5 INHALANT ORAL at 23:07

## 2024-01-15 RX ADMIN — IPRATROPIUM BROMIDE AND ALBUTEROL SULFATE 3 ML: 2.5; .5 SOLUTION RESPIRATORY (INHALATION) at 17:26

## 2024-01-15 RX ADMIN — Medication 2.5 MG: at 18:50

## 2024-01-15 RX ADMIN — GUAIFENESIN 600 MG: 600 TABLET, EXTENDED RELEASE ORAL at 23:02

## 2024-01-15 RX ADMIN — IPRATROPIUM BROMIDE AND ALBUTEROL SULFATE 3 ML: 2.5; .5 SOLUTION RESPIRATORY (INHALATION) at 17:15

## 2024-01-15 RX ADMIN — MAGNESIUM SULFATE IN WATER 2 G: 2 INJECTION, SOLUTION INTRAVENOUS at 18:42

## 2024-01-15 RX ADMIN — IPRATROPIUM BROMIDE AND ALBUTEROL SULFATE 3 ML: 2.5; .5 SOLUTION RESPIRATORY (INHALATION) at 17:36

## 2024-01-15 ASSESSMENT — PAIN SCALES - GENERAL: PAINLEVEL_OUTOF10: 0 - NO PAIN

## 2024-01-15 ASSESSMENT — COLUMBIA-SUICIDE SEVERITY RATING SCALE - C-SSRS
1. IN THE PAST MONTH, HAVE YOU WISHED YOU WERE DEAD OR WISHED YOU COULD GO TO SLEEP AND NOT WAKE UP?: NO
2. HAVE YOU ACTUALLY HAD ANY THOUGHTS OF KILLING YOURSELF?: NO
6. HAVE YOU EVER DONE ANYTHING, STARTED TO DO ANYTHING, OR PREPARED TO DO ANYTHING TO END YOUR LIFE?: NO

## 2024-01-15 ASSESSMENT — PAIN - FUNCTIONAL ASSESSMENT: PAIN_FUNCTIONAL_ASSESSMENT: 0-10

## 2024-01-15 NOTE — ED PROVIDER NOTES
CC: Shortness of Breath     History provided by: Patient  Limitations to History: None    HPI:  Yesy Ayala is a 68-year-old female with a past medical history of COPD, diabetes, hyperlipidemia, hypertension and peripheral vascular disease that presents to the emergency department for shortness of breath.  The patient states that she began having shortness of breath 3 days ago and has been worsening since the onset.  She states that this feels similar to her previous COPD exacerbations.  She has been using her inhalers at home with minimal relief of her symptoms.  This initially started while she was ambulating but now has shortness of breath at rest.  She denies any associated fevers, chills, chest pain or any other symptoms at this time.  The patient has no history of PE/DVT long distance travel.    External Records Reviewed: Outpatient records reviewed, previous medical history reviewed  ???????????????????????????????????????????????????????????????  Triage Vitals:  T 37 °C (98.6 °F)  HR 95  BP (!) 168/91  RR (!) 34  O2 100 % None (Room air)    Vital signs reviewed in nursing triage note, EMR flow sheets, and at patient's bedside.   General: Awake, alert, in no acute distress  Eyes: Gaze conjugate.  No scleral icterus or injection  HENT: Normo-cephalic, atraumatic. No stridor. No rhinorrhea or epistaxis.  CV: Regular rhythm. No murmurs appreciated. Radial pulses 2+ bilaterally  Respiratory: Tachypnea with inspiratory and expiratory wheezes throughout.    GI: Soft, non-distended, non-tender. No rebound or guarding.  MSK/Extremities: No gross bony deformities. Moving all extremities  Skin: Warm. Appropriate color  Neuro: Alert. Oriented. Face symmetric. Speech is fluent.  Gross strength and sensation intact in b/l UE and LEs  Psych: Appropriate mood and affect   ???????????????????????????????????????????????????????????????  ED Course/Treatment/Medical Decision Making    EKG Interpretation:  EKG personally  interpreted by me showing NSR at a rate of 93 bpm with normal axis and occasional PVCs.  VT, QRS and QT intervals are all within normal limits.  Nonspecific ST changes with T wave inversions previously noted in II, III, V4-V6 no longer present.  No ST elevation or other signs of ischemia.  Compared to previous EKG on 8/10/2022.     Independent Interpretation of Studies:  I independently interpreted: ED course below  -Labs  -EKG    Differential diagnoses considered include but ar not limited to: COPD exacerbation, pneumonia, viral syndrome, PE    Social Determinants Limiting Care:  None identified         ED Course:  ED Course as of 01/15/24 2238   Mon Jeevan 15, 2024   1732 Blood Gas, Venous Full Panel(!)  VBG with normal pH and hyperglycemia but otherwise unremarkable. [RS]   1758 CBC and Auto Differential(!)  No significant abnormalities [RS]      ED Course User Index  [RS] Sudeep Moe,          Diagnoses as of 01/15/24 2238   COPD exacerbation (CMS/HCC)   RSV (acute bronchiolitis due to respiratory syncytial virus)       MDM:  Yesy Ayala is a 68-year-old female with past medical history of COPD, diabetes, hyperlipidemia, hypertension and peripheral vascular disease that presents to the emergency department for shortness of breath.  Upon arrival to the emergency department, the patient was mildly hypertensive and tachypneic but had otherwise stable vital signs.  On exam she she was tachypneic with inspiratory and expiratory wheezes throughout.  VBG showed no significant abnormalities.  The patient was given DuoNebs x 3 and Solu-Medrol for her symptoms.  On reassessment patient continued to have tachypnea and inspiratory wheezing.  Additional albuterol treatments ordered as well as magnesium.  There is low clinical suspicion for PE as the patient's Wells score was 0 making this less likely. The patient was signed out to the oncoming provider in stable condition pending chest x-ray, labs and final disposition  with likely admission to the hospital for COPD exacerbation.    Impression:  COPD exacerbation  RSV infection    Disposition:  Signed out to Lianet Payne MD    Assessment and plan discussed with Dr. Amanda Moe, DO   Emergency Medicine, PGY-1       Procedures ? SmartLinks last updated 1/15/2024 10:38 PM        Sudeep Moe,   Resident  01/15/24 2231       Amanda Hebert MD  01/16/24 0131

## 2024-01-15 NOTE — ED TRIAGE NOTES
Patient ambulatory to ED with complaint of shortness of breath and cough since Friday. Hx of COPD, still smokes. States she has been using her albuterol and Symbicort inhalers with no relief. Denies CP. Patient is tachypneic but 100% on RA.

## 2024-01-16 PROBLEM — E78.5 HYPERLIPIDEMIA: Chronic | Status: ACTIVE | Noted: 2023-10-16

## 2024-01-16 PROBLEM — J21.0 RSV (ACUTE BRONCHIOLITIS DUE TO RESPIRATORY SYNCYTIAL VIRUS): Chronic | Status: ACTIVE | Noted: 2024-01-16

## 2024-01-16 PROBLEM — J44.1 COPD EXACERBATION (MULTI): Chronic | Status: ACTIVE | Noted: 2024-01-15

## 2024-01-16 PROBLEM — I73.9 PERIPHERAL VASCULAR DISEASE (CMS-HCC): Chronic | Status: ACTIVE | Noted: 2023-10-16

## 2024-01-16 PROBLEM — E11.9 DIABETES MELLITUS (MULTI): Chronic | Status: ACTIVE | Noted: 2023-10-16

## 2024-01-16 PROBLEM — N18.31 STAGE 3A CHRONIC KIDNEY DISEASE (MULTI): Chronic | Status: ACTIVE | Noted: 2024-01-16

## 2024-01-16 PROBLEM — I10 HYPERTENSION: Chronic | Status: ACTIVE | Noted: 2023-10-16

## 2024-01-16 PROBLEM — J21.0 RSV (ACUTE BRONCHIOLITIS DUE TO RESPIRATORY SYNCYTIAL VIRUS): Status: ACTIVE | Noted: 2024-01-16

## 2024-01-16 LAB
ANION GAP SERPL CALC-SCNC: 18 MMOL/L (ref 10–20)
ATRIAL RATE: 93 BPM
BUN SERPL-MCNC: 17 MG/DL (ref 6–23)
CALCIUM SERPL-MCNC: 8.6 MG/DL (ref 8.6–10.3)
CHLORIDE SERPL-SCNC: 97 MMOL/L (ref 98–107)
CO2 SERPL-SCNC: 20 MMOL/L (ref 21–32)
CREAT SERPL-MCNC: 1.79 MG/DL (ref 0.5–1.05)
EGFRCR SERPLBLD CKD-EPI 2021: 31 ML/MIN/1.73M*2
ERYTHROCYTE [DISTWIDTH] IN BLOOD BY AUTOMATED COUNT: 18.7 % (ref 11.5–14.5)
ERYTHROCYTE [DISTWIDTH] IN BLOOD BY AUTOMATED COUNT: 18.8 % (ref 11.5–14.5)
GLUCOSE BLD MANUAL STRIP-MCNC: 143 MG/DL (ref 74–99)
GLUCOSE BLD MANUAL STRIP-MCNC: 417 MG/DL (ref 74–99)
GLUCOSE BLD MANUAL STRIP-MCNC: 513 MG/DL (ref 74–99)
GLUCOSE SERPL-MCNC: 663 MG/DL (ref 74–99)
HCT VFR BLD AUTO: 39.6 % (ref 36–46)
HCT VFR BLD AUTO: 41.5 % (ref 36–46)
HGB BLD-MCNC: 12.6 G/DL (ref 12–16)
HGB BLD-MCNC: 13.3 G/DL (ref 12–16)
MCH RBC QN AUTO: 20.8 PG (ref 26–34)
MCH RBC QN AUTO: 21.2 PG (ref 26–34)
MCHC RBC AUTO-ENTMCNC: 31.8 G/DL (ref 32–36)
MCHC RBC AUTO-ENTMCNC: 32 G/DL (ref 32–36)
MCV RBC AUTO: 66 FL (ref 80–100)
MCV RBC AUTO: 66 FL (ref 80–100)
NRBC BLD-RTO: 0 /100 WBCS (ref 0–0)
NRBC BLD-RTO: 0 /100 WBCS (ref 0–0)
P AXIS: 71 DEGREES
P OFFSET: 199 MS
P ONSET: 150 MS
PLATELET # BLD AUTO: 71 X10*3/UL (ref 150–450)
PLATELET # BLD AUTO: 73 X10*3/UL (ref 150–450)
POTASSIUM SERPL-SCNC: 3.2 MMOL/L (ref 3.5–5.3)
PR INTERVAL: 134 MS
Q ONSET: 217 MS
QRS COUNT: 15 BEATS
QRS DURATION: 86 MS
QT INTERVAL: 366 MS
QTC CALCULATION(BAZETT): 455 MS
QTC FREDERICIA: 423 MS
R AXIS: 47 DEGREES
RBC # BLD AUTO: 6.05 X10*6/UL (ref 4–5.2)
RBC # BLD AUTO: 6.27 X10*6/UL (ref 4–5.2)
SODIUM SERPL-SCNC: 132 MMOL/L (ref 136–145)
T AXIS: 87 DEGREES
T OFFSET: 400 MS
VENTRICULAR RATE: 93 BPM
WBC # BLD AUTO: 19.8 X10*3/UL (ref 4.4–11.3)
WBC # BLD AUTO: 8 X10*3/UL (ref 4.4–11.3)

## 2024-01-16 PROCEDURE — 94640 AIRWAY INHALATION TREATMENT: CPT

## 2024-01-16 PROCEDURE — 85027 COMPLETE CBC AUTOMATED: CPT | Performed by: INTERNAL MEDICINE

## 2024-01-16 PROCEDURE — 2500000002 HC RX 250 W HCPCS SELF ADMINISTERED DRUGS (ALT 637 FOR MEDICARE OP, ALT 636 FOR OP/ED): Performed by: INTERNAL MEDICINE

## 2024-01-16 PROCEDURE — 82947 ASSAY GLUCOSE BLOOD QUANT: CPT

## 2024-01-16 PROCEDURE — 2500000004 HC RX 250 GENERAL PHARMACY W/ HCPCS (ALT 636 FOR OP/ED): Performed by: PHYSICIAN ASSISTANT

## 2024-01-16 PROCEDURE — 36415 COLL VENOUS BLD VENIPUNCTURE: CPT | Performed by: INTERNAL MEDICINE

## 2024-01-16 PROCEDURE — 99221 1ST HOSP IP/OBS SF/LOW 40: CPT | Performed by: INTERNAL MEDICINE

## 2024-01-16 PROCEDURE — 2500000001 HC RX 250 WO HCPCS SELF ADMINISTERED DRUGS (ALT 637 FOR MEDICARE OP): Performed by: PHYSICIAN ASSISTANT

## 2024-01-16 PROCEDURE — 1200000002 HC GENERAL ROOM WITH TELEMETRY DAILY

## 2024-01-16 PROCEDURE — 36415 COLL VENOUS BLD VENIPUNCTURE: CPT | Performed by: PHYSICIAN ASSISTANT

## 2024-01-16 PROCEDURE — 85027 COMPLETE CBC AUTOMATED: CPT | Performed by: PHYSICIAN ASSISTANT

## 2024-01-16 PROCEDURE — 96376 TX/PRO/DX INJ SAME DRUG ADON: CPT

## 2024-01-16 PROCEDURE — 2500000002 HC RX 250 W HCPCS SELF ADMINISTERED DRUGS (ALT 637 FOR MEDICARE OP, ALT 636 FOR OP/ED): Performed by: PHYSICIAN ASSISTANT

## 2024-01-16 PROCEDURE — 2500000001 HC RX 250 WO HCPCS SELF ADMINISTERED DRUGS (ALT 637 FOR MEDICARE OP): Performed by: NURSE PRACTITIONER

## 2024-01-16 PROCEDURE — 80048 BASIC METABOLIC PNL TOTAL CA: CPT | Performed by: PHYSICIAN ASSISTANT

## 2024-01-16 PROCEDURE — 99232 SBSQ HOSP IP/OBS MODERATE 35: CPT | Performed by: NURSE PRACTITIONER

## 2024-01-16 RX ORDER — AMLODIPINE BESYLATE 10 MG/1
10 TABLET ORAL DAILY
Status: DISCONTINUED | OUTPATIENT
Start: 2024-01-16 | End: 2024-01-25 | Stop reason: HOSPADM

## 2024-01-16 RX ORDER — ASPIRIN 81 MG/1
81 TABLET ORAL DAILY
Status: DISCONTINUED | OUTPATIENT
Start: 2024-01-16 | End: 2024-01-25 | Stop reason: HOSPADM

## 2024-01-16 RX ORDER — HYDRALAZINE HYDROCHLORIDE 20 MG/ML
10 INJECTION INTRAMUSCULAR; INTRAVENOUS EVERY 6 HOURS PRN
Status: DISCONTINUED | OUTPATIENT
Start: 2024-01-16 | End: 2024-01-25 | Stop reason: HOSPADM

## 2024-01-16 RX ORDER — INSULIN LISPRO 100 [IU]/ML
0-15 INJECTION, SOLUTION INTRAVENOUS; SUBCUTANEOUS
Status: DISCONTINUED | OUTPATIENT
Start: 2024-01-16 | End: 2024-01-18

## 2024-01-16 RX ORDER — METOPROLOL TARTRATE 50 MG/1
100 TABLET ORAL NIGHTLY
Status: DISCONTINUED | OUTPATIENT
Start: 2024-01-16 | End: 2024-01-25 | Stop reason: HOSPADM

## 2024-01-16 RX ORDER — DEXTROSE 50 % IN WATER (D50W) INTRAVENOUS SYRINGE
25
Status: DISCONTINUED | OUTPATIENT
Start: 2024-01-16 | End: 2024-01-18

## 2024-01-16 RX ORDER — ROSUVASTATIN CALCIUM 10 MG/1
10 TABLET, COATED ORAL NIGHTLY
Status: DISCONTINUED | OUTPATIENT
Start: 2024-01-16 | End: 2024-01-25 | Stop reason: HOSPADM

## 2024-01-16 RX ADMIN — METOPROLOL TARTRATE 100 MG: 50 TABLET, FILM COATED ORAL at 04:36

## 2024-01-16 RX ADMIN — GUAIFENESIN 600 MG: 600 TABLET, EXTENDED RELEASE ORAL at 20:24

## 2024-01-16 RX ADMIN — INSULIN HUMAN 12 UNITS: 100 INJECTION, SUSPENSION SUBCUTANEOUS at 20:09

## 2024-01-16 RX ADMIN — IPRATROPIUM BROMIDE AND ALBUTEROL SULFATE 3 ML: 2.5; .5 SOLUTION RESPIRATORY (INHALATION) at 08:32

## 2024-01-16 RX ADMIN — PANTOPRAZOLE SODIUM 40 MG: 40 TABLET, DELAYED RELEASE ORAL at 06:24

## 2024-01-16 RX ADMIN — METHYLPREDNISOLONE SODIUM SUCCINATE 40 MG: 125 INJECTION, POWDER, FOR SOLUTION INTRAMUSCULAR; INTRAVENOUS at 13:33

## 2024-01-16 RX ADMIN — AMLODIPINE BESYLATE 10 MG: 10 TABLET ORAL at 13:33

## 2024-01-16 RX ADMIN — ROSUVASTATIN 10 MG: 10 TABLET, FILM COATED ORAL at 20:24

## 2024-01-16 RX ADMIN — METHYLPREDNISOLONE SODIUM SUCCINATE 40 MG: 125 INJECTION, POWDER, FOR SOLUTION INTRAMUSCULAR; INTRAVENOUS at 06:24

## 2024-01-16 RX ADMIN — BUDESONIDE 0.5 MG: 0.5 INHALANT ORAL at 08:32

## 2024-01-16 RX ADMIN — INSULIN LISPRO 15 UNITS: 100 INJECTION, SOLUTION INTRAVENOUS; SUBCUTANEOUS at 06:24

## 2024-01-16 RX ADMIN — METOPROLOL TARTRATE 100 MG: 50 TABLET, FILM COATED ORAL at 20:24

## 2024-01-16 RX ADMIN — INSULIN LISPRO 15 UNITS: 100 INJECTION, SOLUTION INTRAVENOUS; SUBCUTANEOUS at 20:09

## 2024-01-16 RX ADMIN — GUAIFENESIN 600 MG: 600 TABLET, EXTENDED RELEASE ORAL at 09:06

## 2024-01-16 RX ADMIN — IPRATROPIUM BROMIDE AND ALBUTEROL SULFATE 3 ML: 2.5; .5 SOLUTION RESPIRATORY (INHALATION) at 12:20

## 2024-01-16 RX ADMIN — ASPIRIN 81 MG: 81 TABLET, COATED ORAL at 09:06

## 2024-01-16 RX ADMIN — METHYLPREDNISOLONE SODIUM SUCCINATE 40 MG: 125 INJECTION, POWDER, FOR SOLUTION INTRAMUSCULAR; INTRAVENOUS at 21:13

## 2024-01-16 RX ADMIN — IPRATROPIUM BROMIDE AND ALBUTEROL SULFATE 3 ML: 2.5; .5 SOLUTION RESPIRATORY (INHALATION) at 19:31

## 2024-01-16 RX ADMIN — INSULIN HUMAN 12 UNITS: 100 INJECTION, SUSPENSION SUBCUTANEOUS at 11:41

## 2024-01-16 RX ADMIN — BUDESONIDE 0.5 MG: 0.5 INHALANT ORAL at 19:31

## 2024-01-16 RX ADMIN — Medication 3 MG: at 19:26

## 2024-01-16 SDOH — SOCIAL STABILITY: SOCIAL INSECURITY: HAVE YOU HAD THOUGHTS OF HARMING ANYONE ELSE?: NO

## 2024-01-16 SDOH — SOCIAL STABILITY: SOCIAL INSECURITY: DO YOU FEEL ANYONE HAS EXPLOITED OR TAKEN ADVANTAGE OF YOU FINANCIALLY OR OF YOUR PERSONAL PROPERTY?: NO

## 2024-01-16 SDOH — SOCIAL STABILITY: SOCIAL INSECURITY: DOES ANYONE TRY TO KEEP YOU FROM HAVING/CONTACTING OTHER FRIENDS OR DOING THINGS OUTSIDE YOUR HOME?: NO

## 2024-01-16 SDOH — SOCIAL STABILITY: SOCIAL INSECURITY: ABUSE: ADULT

## 2024-01-16 SDOH — SOCIAL STABILITY: SOCIAL INSECURITY: DO YOU FEEL UNSAFE GOING BACK TO THE PLACE WHERE YOU ARE LIVING?: NO

## 2024-01-16 SDOH — SOCIAL STABILITY: SOCIAL INSECURITY: HAS ANYONE EVER THREATENED TO HURT YOUR FAMILY OR YOUR PETS?: NO

## 2024-01-16 SDOH — SOCIAL STABILITY: SOCIAL INSECURITY: WERE YOU ABLE TO COMPLETE ALL THE BEHAVIORAL HEALTH SCREENINGS?: YES

## 2024-01-16 SDOH — SOCIAL STABILITY: SOCIAL INSECURITY: ARE YOU OR HAVE YOU BEEN THREATENED OR ABUSED PHYSICALLY, EMOTIONALLY, OR SEXUALLY BY ANYONE?: NO

## 2024-01-16 SDOH — SOCIAL STABILITY: SOCIAL INSECURITY: ARE THERE ANY APPARENT SIGNS OF INJURIES/BEHAVIORS THAT COULD BE RELATED TO ABUSE/NEGLECT?: NO

## 2024-01-16 ASSESSMENT — ACTIVITIES OF DAILY LIVING (ADL)
TOILETING: INDEPENDENT
JUDGMENT_ADEQUATE_SAFELY_COMPLETE_DAILY_ACTIVITIES: YES
GROOMING: INDEPENDENT
HEARING - RIGHT EAR: FUNCTIONAL
BATHING: INDEPENDENT
DRESSING YOURSELF: INDEPENDENT
LACK_OF_TRANSPORTATION: NO
HEARING - LEFT EAR: FUNCTIONAL
PATIENT'S MEMORY ADEQUATE TO SAFELY COMPLETE DAILY ACTIVITIES?: YES
FEEDING YOURSELF: INDEPENDENT
WALKS IN HOME: INDEPENDENT
ADEQUATE_TO_COMPLETE_ADL: YES

## 2024-01-16 ASSESSMENT — PATIENT HEALTH QUESTIONNAIRE - PHQ9
1. LITTLE INTEREST OR PLEASURE IN DOING THINGS: NOT AT ALL
2. FEELING DOWN, DEPRESSED OR HOPELESS: NOT AT ALL
SUM OF ALL RESPONSES TO PHQ9 QUESTIONS 1 & 2: 0

## 2024-01-16 ASSESSMENT — ENCOUNTER SYMPTOMS
HALLUCINATIONS: 0
WEAKNESS: 0
SHORTNESS OF BREATH: 1
ENDOCRINE COMMENTS: AS ABOVE
EYE PAIN: 0
CONFUSION: 0
NUMBNESS: 0
SORE THROAT: 0
NAUSEA: 0
UNEXPECTED WEIGHT CHANGE: 0
FEVER: 0
FATIGUE: 1
HEADACHES: 1
COUGH: 1
DIARRHEA: 0
MYALGIAS: 0
WHEEZING: 1
CHILLS: 0
VOMITING: 0
DYSURIA: 0

## 2024-01-16 ASSESSMENT — PAIN - FUNCTIONAL ASSESSMENT
PAIN_FUNCTIONAL_ASSESSMENT: 0-10
PAIN_FUNCTIONAL_ASSESSMENT: 0-10

## 2024-01-16 ASSESSMENT — LIFESTYLE VARIABLES
PRESCIPTION_ABUSE_PAST_12_MONTHS: NO
HOW MANY STANDARD DRINKS CONTAINING ALCOHOL DO YOU HAVE ON A TYPICAL DAY: PATIENT DOES NOT DRINK
SKIP TO QUESTIONS 9-10: 1
AUDIT-C TOTAL SCORE: 0
HOW OFTEN DO YOU HAVE A DRINK CONTAINING ALCOHOL: NEVER
AUDIT-C TOTAL SCORE: 0
HOW OFTEN DO YOU HAVE 6 OR MORE DRINKS ON ONE OCCASION: NEVER
SUBSTANCE_ABUSE_PAST_12_MONTHS: NO

## 2024-01-16 ASSESSMENT — COGNITIVE AND FUNCTIONAL STATUS - GENERAL
MOBILITY SCORE: 21
PATIENT BASELINE BEDBOUND: NO
DAILY ACTIVITIY SCORE: 24
CLIMB 3 TO 5 STEPS WITH RAILING: A LOT
WALKING IN HOSPITAL ROOM: A LITTLE

## 2024-01-16 ASSESSMENT — PAIN SCALES - GENERAL
PAINLEVEL_OUTOF10: 0 - NO PAIN

## 2024-01-16 NOTE — ED PROVIDER NOTES
HPI   Chief Complaint   Patient presents with    Shortness of Breath       HPI                    Frances Coma Scale Score: 15                  Patient History   Past Medical History:   Diagnosis Date    Body mass index (BMI) 27.0-27.9, adult 01/18/2020    BMI 27.0-27.9,adult    Other conditions influencing health status     Coronary Artery Disease    Personal history of other diseases of the circulatory system     History of hypertension    Personal history of other diseases of the circulatory system     History of intermittent claudication    Personal history of other diseases of the circulatory system     History of peripheral vascular disease    Personal history of other endocrine, nutritional and metabolic disease     History of hyperlipidemia     Past Surgical History:   Procedure Laterality Date    OTHER SURGICAL HISTORY  08/22/2013    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  Social History     Tobacco Use    Smoking status: Never     Passive exposure: Never    Smokeless tobacco: Never   Substance Use Topics    Alcohol use: Never    Drug use: Not on file       Physical Exam   ED Triage Vitals   Temp Heart Rate Resp BP   01/15/24 1457 01/15/24 1457 01/15/24 1457 01/15/24 1457   37 °C (98.6 °F) 95 (!) 34 (!) 168/91      SpO2 Temp src Heart Rate Source Patient Position   01/15/24 1457 -- 01/15/24 1930 --   100 %  Monitor       BP Location FiO2 (%)     -- --             Physical Exam    ED Course & [facility]   ED Course as of 01/16/24 0529   Mon Jeevan 15, 2024   1732 Blood Gas, Venous Full Panel(!)  VBG with normal pH and hyperglycemia but otherwise unremarkable. [RS]   1758 CBC and Auto Differential(!)  No significant abnormalities [RS]      ED Course User Index  [RS] Sudeep Moe,          Diagnoses as of 01/16/24  0529   COPD exacerbation (CMS/[facility])   RSV (acute bronchiolitis due to respiratory syncytial virus)       Medical Decision Making      Procedure  Procedures

## 2024-01-16 NOTE — CARE PLAN
The patient's goals for the shift include remaining injury free     The clinical goals for the shift include p twill remain free of discomfort    Over the shift, the patient did not make progress toward the following goals. Barriers to progression include discomfort. Recommendations to address these barriers include increase comfort.

## 2024-01-16 NOTE — PROGRESS NOTES
Yesy Ayala is a 68 y.o. female on day 0 of admission presenting with COPD exacerbation (CMS/HCC).      Subjective   Patient still wheezing but is much better at this time.  Started on solumedrol and endocrine started her on insulin.  Will get pharmacy to do diabetic teaching and will continue to monitor blood sugars.     ROS  Gen: No fatigue, fever, sweats.  Head: No headache, trauma.  Eyes: No vision loss, double vision, drainage, eye pain.  ENT: No hearing changes, pain, epistaxis, congestion  Cardiac: No chest pain  Pulmonary: POSITIVE shortness of breath,  pleuritic pain,   Heme/lymph: No swollen glands  GI: No abdominal pain, nausea, vomiting, diarrhea  : No  dysuria, frequency, urgency, hematuria  Musculoskeletal: No limb pain, joint pain, back pain, joint swelling or stiffness.  Skin: No rashes, pruritus, lumps, lesions.  Neuro: No Numbness, tingling, or weakness.  Psych: No  anxiety     Review of systems is otherwise negative unless stated above or in history of present illness.    Objective     Last Recorded Vitals  BP (!) 174/100   Pulse 69   Temp 37 °C (98.6 °F)   Resp 18   Wt 57.6 kg (127 lb)   SpO2 99%   Intake/Output last 3 Shifts:  No intake or output data in the 24 hours ending 01/16/24 1215    Admission Weight  Weight: 57.6 kg (127 lb) (01/15/24 1457)    Daily Weight  01/15/24 : 57.6 kg (127 lb)    Image Results  ECG 12 lead  See ED provider note for full interpretation and clinical correlation      Physical Exam  General: Vital signs stable, Pt is alert, no acute distress  Eyes: Conjunctiva normal, PERRL, EOMs intact  HENMT: Normocephalic, atraumatic, external ears and nose normal, no scars or masses.  No mastoid tenderness. Trachea is midline. No meningeal signs, negative Kernig and Brudzinski, moves neck freely.  No sinus tenderness  Resp: Positive wheezing throughout using accessory muscles on oxygen nasal cannula receiving aerosol treatment  CV: Heart is regular rate and rhythm.   Skin:  No evidence of trauma, skin is warm and dry. No rashes, lesions or ulcers.  Skel: full range of motion of upper and lower extremities.   Neuro: Normal gait, CN II-XII intact, no motor or sensory changes.  Psych: Alert and oriented ×3, judgment is appropriate, normal mood and affect     Relevant Results             Results for orders placed or performed during the hospital encounter of 01/15/24 (from the past 24 hour(s))   ECG 12 lead   Result Value Ref Range    Ventricular Rate 93 BPM    Atrial Rate 93 BPM    AK Interval 134 ms    QRS Duration 86 ms    QT Interval 366 ms    QTC Calculation(Bazett) 455 ms    P Axis 71 degrees    R Axis 47 degrees    T Axis 87 degrees    QRS Count 15 beats    Q Onset 217 ms    P Onset 150 ms    P Offset 199 ms    T Offset 400 ms    QTC Fredericia 423 ms   Blood Gas, Venous Full Panel   Result Value Ref Range    POCT pH, Venous 7.37 7.33 - 7.43 pH    POCT pCO2, Venous 51 41 - 51 mm Hg    POCT pO2, Venous 25 (L) 35 - 45 mm Hg    POCT SO2, Venous 23 (L) 45 - 75 %    POCT Oxy Hemoglobin, Venous 22.3 (L) 45.0 - 75.0 %    POCT Hematocrit Calculated, Venous 44.0 36.0 - 46.0 %    POCT Sodium, Venous 138 136 - 145 mmol/L    POCT Potassium, Venous 3.8 3.5 - 5.3 mmol/L    POCT Chloride, Venous 104 98 - 107 mmol/L    POCT Ionized Calicum, Venous 1.25 1.10 - 1.33 mmol/L    POCT Glucose, Venous 354 (H) 74 - 99 mg/dL    POCT Lactate, Venous 2.9 (H) 0.4 - 2.0 mmol/L    POCT Base Excess, Venous 3.0 -2.0 - 3.0 mmol/L    POCT HCO3 Calculated, Venous 29.5 (H) 22.0 - 26.0 mmol/L    POCT Hemoglobin, Venous 14.8 12.0 - 16.0 g/dL    POCT Anion Gap, Venous 8.0 (L) 10.0 - 25.0 mmol/L    Patient Temperature 37.0 degrees Celsius    FiO2 21 %   CBC and Auto Differential   Result Value Ref Range    WBC 8.1 4.4 - 11.3 x10*3/uL    nRBC 0.0 0.0 - 0.0 /100 WBCs    RBC 6.82 (H) 4.00 - 5.20 x10*6/uL    Hemoglobin 14.5 12.0 - 16.0 g/dL    Hematocrit 45.6 36.0 - 46.0 %    MCV 67 (L) 80 - 100 fL    MCH 21.3 (L) 26.0 - 34.0  pg    MCHC 31.8 (L) 32.0 - 36.0 g/dL    RDW 19.8 (H) 11.5 - 14.5 %    Platelets 65 (L) 150 - 450 x10*3/uL    Immature Granulocytes %, Automated 0.2 0.0 - 0.9 %    Immature Granulocytes Absolute, Automated 0.02 0.00 - 0.70 x10*3/uL   Comprehensive metabolic panel   Result Value Ref Range    Glucose 318 (H) 74 - 99 mg/dL    Sodium 139 136 - 145 mmol/L    Potassium 3.5 3.5 - 5.3 mmol/L    Chloride 103 98 - 107 mmol/L    Bicarbonate 27 21 - 32 mmol/L    Anion Gap 13 10 - 20 mmol/L    Urea Nitrogen 14 6 - 23 mg/dL    Creatinine 1.65 (H) 0.50 - 1.05 mg/dL    eGFR 34 (L) >60 mL/min/1.73m*2    Calcium 9.2 8.6 - 10.3 mg/dL    Albumin 3.8 3.4 - 5.0 g/dL    Alkaline Phosphatase 106 33 - 136 U/L    Total Protein 7.1 6.4 - 8.2 g/dL    AST 20 9 - 39 U/L    Bilirubin, Total 1.0 0.0 - 1.2 mg/dL    ALT 15 7 - 45 U/L   Protime-INR   Result Value Ref Range    Protime 12.1 9.8 - 12.8 seconds    INR 1.1 0.9 - 1.1   aPTT   Result Value Ref Range    aPTT 27 27 - 38 seconds   Troponin I, High Sensitivity   Result Value Ref Range    Troponin I, High Sensitivity 46 (H) 0 - 13 ng/L   Manual Differential   Result Value Ref Range    Neutrophils %, Manual 60.0 40.0 - 80.0 %    Lymphocytes %, Manual 34.0 13.0 - 44.0 %    Monocytes %, Manual 2.0 2.0 - 10.0 %    Eosinophils %, Manual 0.0 0.0 - 6.0 %    Basophils %, Manual 0.0 0.0 - 2.0 %    Atypical Lymphocytes %, Manual 4.0 0.0 - 2.0 %    Seg Neutrophils Absolute, Manual 4.86 1.20 - 7.00 x10*3/uL    Lymphocytes Absolute, Manual 2.75 1.20 - 4.80 x10*3/uL    Monocytes Absolute, Manual 0.16 0.10 - 1.00 x10*3/uL    Eosinophils Absolute, Manual 0.00 0.00 - 0.70 x10*3/uL    Basophils Absolute, Manual 0.00 0.00 - 0.10 x10*3/uL    Atypical Lymphs Absolute, Manual 0.32 0.00 - 0.50 x10*3/uL    Total Cells Counted 100     RBC Morphology See Below     Target Cells Few     Ovalocytes Few     Branscomb Cells Few    Blood Gas Lactic Acid, Venous   Result Value Ref Range    POCT Lactate, Venous 3.5 (H) 0.4 - 2.0  mmol/L   RSV PCR   Result Value Ref Range    RSV PCR Detected (A) Not Detected   Sars-CoV-2 and Influenza A/B PCR   Result Value Ref Range    Flu A Result Not Detected Not Detected    Flu B Result Not Detected Not Detected    Coronavirus 2019, PCR Not Detected Not Detected   CBC   Result Value Ref Range    WBC 8.0 4.4 - 11.3 x10*3/uL    nRBC 0.0 0.0 - 0.0 /100 WBCs    RBC 6.27 (H) 4.00 - 5.20 x10*6/uL    Hemoglobin 13.3 12.0 - 16.0 g/dL    Hematocrit 41.5 36.0 - 46.0 %    MCV 66 (L) 80 - 100 fL    MCH 21.2 (L) 26.0 - 34.0 pg    MCHC 32.0 32.0 - 36.0 g/dL    RDW 18.8 (H) 11.5 - 14.5 %    Platelets 71 (L) 150 - 450 x10*3/uL   Basic metabolic panel   Result Value Ref Range    Glucose 663 (HH) 74 - 99 mg/dL    Sodium 132 (L) 136 - 145 mmol/L    Potassium 3.2 (L) 3.5 - 5.3 mmol/L    Chloride 97 (L) 98 - 107 mmol/L    Bicarbonate 20 (L) 21 - 32 mmol/L    Anion Gap 18 10 - 20 mmol/L    Urea Nitrogen 17 6 - 23 mg/dL    Creatinine 1.79 (H) 0.50 - 1.05 mg/dL    eGFR 31 (L) >60 mL/min/1.73m*2    Calcium 8.6 8.6 - 10.3 mg/dL   POCT GLUCOSE   Result Value Ref Range    POCT Glucose 417 (H) 74 - 99 mg/dL   POCT GLUCOSE   Result Value Ref Range    POCT Glucose 143 (H) 74 - 99 mg/dL     ECG 12 lead    Result Date: 1/16/2024  See ED provider note for full interpretation and clinical correlation    XR chest 2 views    Result Date: 1/15/2024  STUDY: Chest Radiographs;  01/15/2024 INDICATION: Shortness of breath. COMPARISON: 09/09/2019 XR chest ACCESSION NUMBER(S): [id number] ORDERING CLINICIAN: TECHNIQUE:  Frontal and lateral chest. FINDINGS: CARDIOMEDIASTINAL SILHOUETTE: Cardiomediastinal silhouette is normal in size and configuration.  LUNGS: Lungs are clear.  ABDOMEN: No remarkable upper abdominal findings.  BONES: No acute osseous changes.    No acute pulmonary abnormality. Signed by Ceasar Davies MD     Assessment/Plan      Principal Problem:    COPD exacerbation (CMS/[facility])  Active Problems:    COPD (chronic obstructive  pulmonary disease) (CMS/[facility])    Diabetes mellitus (CMS/[facility])    Hyperlipidemia    Hypertension    Peripheral vascular disease (CMS/[facility])    RSV (acute bronchiolitis due to respiratory syncytial virus)    Acute COPD exacerbation  RSV  -CBC: thrombocytopenia, no leukocytosis, no acute anemia  -CMP: no acute electrolyte abnormalities, no acute renal or liver dysfunction appreciated   -HS ISMA: elevated at 46  -EKG: non-ischemia  -CXR: negative  -BPH, anti-tussives, anti-mucolytics PRN symptom management   -duonebs, budesonide nebulizer, RT consult  -received IV solumedrol in the ER --> wean to oral prednisone   -referral to COPD clinic        HLD  HTN  Type 2 DM  -resume home meds as appropriate  -Insulin sliding scale 3 - POCT glucose 663  -Endocrine started on NPH   -Diabetic education with pharmacy     VTE prophylaxis  -Lovenox held - CMP showed low platelets at 65  -SCDs     GI prophylaxis  -Protonix. Bowel regimen     Labs/Testing reviewed    Interdisciplinary team rounding completed with hospitalist, nurse, TCC    NP discussed plan and lab/testing results with Dr. Weaver    Continue to monitor oxygenation and respiratory status  Steroids given started on insulin  Endocrine seen patient in the ER started on NPH for elevated blood sugars  Diabetic teaching through pharmacy    * 45 minutes total spent on patient's care today; >50% time spent on counseling/coordination of care         GAEL Shi-CNP

## 2024-01-16 NOTE — PROGRESS NOTES
01/16/24 0734   [facility] Disability Status   Are you deaf or do you have serious difficulty hearing? N   Are you blind or do you have serious difficulty seeing, even when wearing glasses? N   Because of a physical, mental, or emotional condition, do you have serious difficulty concentrating, remembering, or making decisions? (5 years old or older) N   Do you have serious difficulty walking or climbing stairs? N   Do you have serious difficulty dressing or bathing? N   Because of a physical, mental, or emotional condition, do you have serious difficulty doing errands alone such as visiting the doctor? N

## 2024-01-16 NOTE — H&P
History Of Present Illness  Yesy Ayala is a 68 y.o. female with PMH significant for *** who presented with c/o ***    Past Medical History  Past Medical History:   Diagnosis Date    Body mass index (BMI) 27.0-27.9, adult 01/18/2020    BMI 27.0-27.9,adult    Other conditions influencing health status     Coronary Artery Disease    Personal history of other diseases of the circulatory system     History of hypertension    Personal history of other diseases of the circulatory system     History of intermittent claudication    Personal history of other diseases of the circulatory system     History of peripheral vascular disease    Personal history of other endocrine, nutritional and metabolic disease     History of hyperlipidemia       Surgical History  Past Surgical History:   Procedure Laterality Date    OTHER SURGICAL HISTORY  08/22/2013    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    OTHER SURGICAL HISTORY  08/22/2013    Bypass Graft (Non-Vein) Aortic-bifemoral    OTHER SURGICAL HISTORY  02/20/2014    Previous Stent Placement       Social History  Social History     Socioeconomic History    Marital status: Single     Spouse name: Not on file    Number of children: Not on file    Years of education: Not on file    Highest education level: Not on file   Occupational History    Not on file   Tobacco Use    Smoking status: Never     Passive exposure: Never    Smokeless tobacco: Never   Substance and Sexual Activity    Alcohol use: Never    Drug use: Not on file    Sexual activity: Not on file   Other Topics Concern    Not on file   Social History Narrative    Not on file     Social Determinants of Health     Financial Resource Strain: Not on file   Food Insecurity: Not on file   Transportation Needs: Not on file   Physical Activity: Not on file   Stress: Not on file   Social Connections: Not on file   Intimate Partner Violence: Not on file   Housing Stability: Not on file        Family History  No  family history on file.     Allergies  Allergies as of 01/15/2024 - Reviewed 01/15/2024   Allergen Reaction Noted    Penicillin Anaphylaxis 10/16/2023    Metformin Dizziness and Nausea Only 10/16/2023    Sulfa (sulfonamide antibiotics) Hives, Itching, and Rash 10/16/2023        Review of Systems  Review of Systems    Relevant results reviewed   PROVIDER notes  Nursing notes  MEDS:  Current Facility-Administered Medications   Medication Dose Route Frequency Provider Last Rate Last Admin    acetaminophen (Tylenol) tablet 975 mg  975 mg oral q6h PRN Flavia Morataya PA-C        albuterol 2.5 mg /3 mL (0.083 %) nebulizer solution 2.5 mg  2.5 mg nebulization q4h PRN Flavia Morataya PA-C        benzonatate (Tessalon) capsule 100 mg  100 mg oral TID PRN Flavia Morataya PA-C        budesonide (Pulmicort) 0.5 mg/2 mL nebulizer solution 0.5 mg  0.5 mg nebulization BID Flavia Morataya PA-C        dextrose 10 % in water (D10W) infusion  30 mL/hr intravenous Once PRN Flavia Morataya PA-C        dextrose 50 % injection 25 g  25 g intravenous q15 min PRN Flavia Morataya PA-C        docusate sodium (Colace) capsule 100 mg  100 mg oral BID PRN Flavia Morataya PA-C        enoxaparin (Lovenox) syringe 30 mg  30 mg subcutaneous q24h Flavia Morataya PA-C        glucagon (Glucagen) injection 1 mg  1 mg intramuscular q15 min PRN Flavia Morataya PA-C        guaiFENesin (Mucinex) 12 hr tablet 600 mg  600 mg oral BID Flavia Morataya PA-C        [START ON 1/16/2024] insulin lispro (HumaLOG) injection 0-5 Units  0-5 Units subcutaneous Before meals & nightly Flavia Morataya PA-C        [START ON 1/16/2024] ipratropium-albuteroL (Duo-Neb) 0.5-2.5 mg/3 mL nebulizer solution 3 mL  3 mL nebulization q6h Flavia Morataya PA-C        [START ON 1/16/2024] melatonin tablet 3 mg  3 mg oral Daily Flavia Morataya PA-C        [START ON 1/16/2024] pantoprazole (ProtoNix) EC tablet 40 mg  40 mg oral Daily before breakfast Flavia Morataya PA-C         Or    [START ON 1/16/2024] pantoprazole (ProtoNix) injection 40 mg  40 mg intravenous Daily before breakfast Flavia Morataya PA-C        [START ON 1/16/2024] predniSONE (Deltasone) tablet 40 mg  40 mg oral Daily Flavia Morataya PA-C         Current Outpatient Medications   Medication Sig Dispense Refill    albuterol 90 mcg/actuation inhaler Inhale 1 puff every 6 hours if needed for shortness of breath. 18 g 2    chlorhexidine (Peridex) 0.12 % solution RINSE MOUTH WITH 15ML (1 CAPFUL) FOR 30 SECONDS IN MORNING AND EVENING AFTER BRUSHING, THEN SPIT      cholecalciferol (Vitamin D-3) 125 MCG (5000 UT) capsule Take 1 capsule (125 mcg) by mouth once daily.      clopidogrel (Plavix) 75 mg tablet Take 1 tablet (75 mg) by mouth once daily.      docusate sodium (Colace) 100 mg capsule Take 1 capsule (100 mg) by mouth once daily.      empagliflozin (Jardiance) 10 mg Take 1 tablet (10 mg) by mouth once daily. 90 tablet 3    ezetimibe (Zetia) 10 mg tablet Take 1 tablet (10 mg) by mouth once daily at bedtime.      ibuprofen 800 mg tablet Take 1 tablet (800 mg) by mouth every 8 hours if needed.      ipratropium-albuteroL (Duo-Neb) 0.5-2.5 mg/3 mL nebulizer solution Take 3 mL by nebulization 4 times a day.      losartan (Cozaar) 50 mg tablet Take 1.5 tablets (75 mg) by mouth once daily.      metoprolol tartrate (Lopressor) 100 mg tablet TAKE 1/2 TABLET BY MOUTH TWICE DAILY 45 tablet 3    nicotine (Nicoderm CQ) 21 mg/24 hr patch Place 1 patch over 24 hours on the skin once every 24 hours. 30 patch 0    nitroglycerin (Nitrostat) 0.4 mg SL tablet Place 1 tablet (0.4 mg) under the tongue every 5 minutes if needed.      rosuvastatin (Crestor) 40 mg tablet TAKE 1 TABLET EVERYDAY AT BEDTIME 90 tablet 3    rosuvastatin (Crestor) 40 mg tablet Take 1 tablet (40 mg) by mouth once daily at bedtime.      Symbicort 160-4.5 mcg/actuation inhaler INHALE 1-2 PUFFS 2 TIMES A DAY. RINSE MOUTH AFTER USE 10.2 each 0      LABS:  Results for orders  placed or performed during the hospital encounter of 01/15/24 (from the past 24 hour(s))   Blood Gas, Venous Full Panel   Result Value Ref Range    POCT pH, Venous 7.37 7.33 - 7.43 pH    POCT pCO2, Venous 51 41 - 51 mm Hg    POCT pO2, Venous 25 (L) 35 - 45 mm Hg    POCT SO2, Venous 23 (L) 45 - 75 %    POCT Oxy Hemoglobin, Venous 22.3 (L) 45.0 - 75.0 %    POCT Hematocrit Calculated, Venous 44.0 36.0 - 46.0 %    POCT Sodium, Venous 138 136 - 145 mmol/L    POCT Potassium, Venous 3.8 3.5 - 5.3 mmol/L    POCT Chloride, Venous 104 98 - 107 mmol/L    POCT Ionized Calicum, Venous 1.25 1.10 - 1.33 mmol/L    POCT Glucose, Venous 354 (H) 74 - 99 mg/dL    POCT Lactate, Venous 2.9 (H) 0.4 - 2.0 mmol/L    POCT Base Excess, Venous 3.0 -2.0 - 3.0 mmol/L    POCT HCO3 Calculated, Venous 29.5 (H) 22.0 - 26.0 mmol/L    POCT Hemoglobin, Venous 14.8 12.0 - 16.0 g/dL    POCT Anion Gap, Venous 8.0 (L) 10.0 - 25.0 mmol/L    Patient Temperature 37.0 degrees Celsius    FiO2 21 %   CBC and Auto Differential   Result Value Ref Range    WBC 8.1 4.4 - 11.3 x10*3/uL    nRBC 0.0 0.0 - 0.0 /100 WBCs    RBC 6.82 (H) 4.00 - 5.20 x10*6/uL    Hemoglobin 14.5 12.0 - 16.0 g/dL    Hematocrit 45.6 36.0 - 46.0 %    MCV 67 (L) 80 - 100 fL    MCH 21.3 (L) 26.0 - 34.0 pg    MCHC 31.8 (L) 32.0 - 36.0 g/dL    RDW 19.8 (H) 11.5 - 14.5 %    Platelets 65 (L) 150 - 450 x10*3/uL    Immature Granulocytes %, Automated 0.2 0.0 - 0.9 %    Immature Granulocytes Absolute, Automated 0.02 0.00 - 0.70 x10*3/uL   Comprehensive metabolic panel   Result Value Ref Range    Glucose 318 (H) 74 - 99 mg/dL    Sodium 139 136 - 145 mmol/L    Potassium 3.5 3.5 - 5.3 mmol/L    Chloride 103 98 - 107 mmol/L    Bicarbonate 27 21 - 32 mmol/L    Anion Gap 13 10 - 20 mmol/L    Urea Nitrogen 14 6 - 23 mg/dL    Creatinine 1.65 (H) 0.50 - 1.05 mg/dL    eGFR 34 (L) >60 mL/min/1.73m*2    Calcium 9.2 8.6 - 10.3 mg/dL    Albumin 3.8 3.4 - 5.0 g/dL    Alkaline Phosphatase 106 33 - 136 U/L    Total  "Protein 7.1 6.4 - 8.2 g/dL    AST 20 9 - 39 U/L    Bilirubin, Total 1.0 0.0 - 1.2 mg/dL    ALT 15 7 - 45 U/L   Protime-INR   Result Value Ref Range    Protime 12.1 9.8 - 12.8 seconds    INR 1.1 0.9 - 1.1   aPTT   Result Value Ref Range    aPTT 27 27 - 38 seconds   Troponin I, High Sensitivity   Result Value Ref Range    Troponin I, High Sensitivity 46 (H) 0 - 13 ng/L   Manual Differential   Result Value Ref Range    Neutrophils %, Manual 60.0 40.0 - 80.0 %    Lymphocytes %, Manual 34.0 13.0 - 44.0 %    Monocytes %, Manual 2.0 2.0 - 10.0 %    Eosinophils %, Manual 0.0 0.0 - 6.0 %    Basophils %, Manual 0.0 0.0 - 2.0 %    Atypical Lymphocytes %, Manual 4.0 0.0 - 2.0 %    Seg Neutrophils Absolute, Manual 4.86 1.20 - 7.00 x10*3/uL    Lymphocytes Absolute, Manual 2.75 1.20 - 4.80 x10*3/uL    Monocytes Absolute, Manual 0.16 0.10 - 1.00 x10*3/uL    Eosinophils Absolute, Manual 0.00 0.00 - 0.70 x10*3/uL    Basophils Absolute, Manual 0.00 0.00 - 0.10 x10*3/uL    Atypical Lymphs Absolute, Manual 0.32 0.00 - 0.50 x10*3/uL    Total Cells Counted 100     RBC Morphology See Below     Target Cells Few     Ovalocytes Few     Branchdale Cells Few    Blood Gas Lactic Acid, Venous   Result Value Ref Range    POCT Lactate, Venous 3.5 (H) 0.4 - 2.0 mmol/L   RSV PCR   Result Value Ref Range    RSV PCR Detected (A) Not Detected   Sars-CoV-2 and Influenza A/B PCR   Result Value Ref Range    Flu A Result Not Detected Not Detected    Flu B Result Not Detected Not Detected    Coronavirus 2019, PCR Not Detected Not Detected      IMAGING:  XR chest 2 views   Final Result   No acute pulmonary abnormality.   Signed by Ceasar Davies MD             PHYSICAL EXAM  /84   Pulse 109   Temp 37 °C (98.6 °F)   Resp 24   Ht 1.499 m (4' 11\")   Wt 57.6 kg (127 lb)   SpO2 100%   BMI 25.65 kg/m²   PHYSICAL EXAM:  GENERAL: Alert, NAD, cooperative  SKIN: Warm and dry.  No suspicious lesions or rashes.  HEENT:  NCAT, PERRLA, EOMI, nonicteric sclera, MMM, " neck supple, trachea midline  LUNGS: Unlabored ***, CTAB, no significant wheezing, rhonchi, or rales appreciated  CARDS: RRR, no m/g/r appreciated  GI: Soft, NTND, BS+, no rebound, no guarding   : no rock, voids independently   MS/Extremities: WWP, *** edema, distal pulses intact, moves all extremities  NEURO: A&Ox***, grossly nonfocal exam, speech fluent, follows commands, answers questions appropriately  PSYCH: mood and behavior appropriate    Assessment/Plan:   Principal Problem:    COPD exacerbation (CMS/HCC)  Acute COPD exacerbation  RSV infection   -CBC: no leukocytosis, no acute anemia, no thrombocytopenia  -CMP: no acute electrolyte abnormalities, no acute renal or liver dysfunction appreciated   -Mg: ***  -HS ISMA: ***  -EKG: non-ischemia  -CXR: ***  -BPH, anti-tussives, anti-mucolytics PRN symptom management   -duonebs, budesonide nebulizer, RT consult  -received *** solumedrol in the ER --> ***   -antibiotics (increased dyspnea, increased purulent sputum) --> (amoxicillin 875 - clavulanate 125 mg by mouth BID, azithromycin 500 mg once, followed by 250 mg by mouth x 4 days, or doxycycline 100 mg by mouth BID)   -referral to COPD clinic  -GI ppx: Protonix.  Bowel regimen  -VTE ppx: ***      Total time spent [including but not limited to]: Obtaining and reviewing patient medical records/history, obtaining a separate history,  examining and assessing the patient, providing  and education, placing pertinent orders for labs/tests/medications,  communicating with the patient/family/health team, documenting in the patient's EMR/formulation of this note, independently interpreting results and data, coordinating care:   *** minutes, with greater than 50% spent in personal discussion with patient and/or family    Flavia Morataya PA-C

## 2024-01-16 NOTE — PROGRESS NOTES
Pharmacy Medication History Review    Yesy Ayala is a 68 y.o. female admitted for COPD exacerbation (CMS/[facility]). Pharmacy reviewed the patient's [id number] medications and allergies for accuracy.    The list below reflectives the updated PTA list. Please review each medication in order reconciliation for additional clarification and justification.  (Not in a hospital admission)       The list below reflectives the updated allergy list. Please review each documented allergy for additional clarification and justification.  Allergies  Reviewed by Vero Da Silva RN on 1/15/2024        Severity Reactions Comments    Penicillin High Anaphylaxis     Metformin Not Specified Dizziness, Nausea Only     Sulfa (sulfonamide Antibiotics) Low Hives, Itching, Rash             Below are additional concerns with the patient's PTA list.  Prior to Admission Medications   Prescriptions Last Dose Informant   Symbicort 160-4.5 mcg/actuation inhaler Past Week Self   Sig: INHALE 1-2 PUFFS 2 TIMES A DAY. RINSE MOUTH AFTER USE   albuterol 90 mcg/actuation inhaler 1/15/2024 Self   Sig: Inhale 1 puff every 6 hours if needed for shortness of breath.   aspirin 81 mg EC tablet 1/15/2024 Self   Sig: Take 1 tablet (81 mg) by mouth once daily.   chlorhexidine (Peridex) 0.12 % solution Unknown Self   Sig: RINSE MOUTH WITH 15ML (1 CAPFUL) FOR 30 SECONDS IN MORNING AND EVENING AFTER BRUSHING, THEN SPIT   cholecalciferol (Vitamin D-3) 125 MCG (5000 UT) capsule 1/15/2024 Self   Sig: Take 1 capsule (125 mcg) by mouth once daily.   empagliflozin (Jardiance) 10 mg Not Taking Self   Sig: Take 1 tablet (10 mg) by mouth once daily.   Patient not taking: Reported on 1/16/2024   ipratropium-albuteroL (Duo-Neb) 0.5-2.5 mg/3 mL nebulizer solution Past Week Self   Sig: Take 3 mL by nebulization 4 times a day.   losartan (Cozaar) 50 mg tablet Unknown Self   Sig: Take 1.5 tablets (75 mg) by mouth once daily.   metoprolol tartrate (Lopressor) 100 mg tablet Past  Week Self   Sig: TAKE 1/2 TABLET BY MOUTH TWICE DAILY   Patient taking differently: Take 1 tablet (100 mg) by mouth once daily at bedtime.   nicotine (Nicoderm CQ) 21 mg/24 hr patch Not Taking    Sig: Place 1 patch over 24 hours on the skin once every 24 hours.   Patient not taking: Reported on 1/16/2024   rosuvastatin (Crestor) 40 mg tablet Past Week Self   Sig: TAKE 1 TABLET EVERYDAY AT BEDTIME      Facility-Administered Medications: None    Spoke with patient    Axel Malagon CPhT

## 2024-01-16 NOTE — NURSING NOTE
Patient resting comfortably in bed. No complaints of pain, no requests at this time. VSS. Will continue to monitor.

## 2024-01-16 NOTE — CONSULTS
Inpatient consult to Endocrinology  Consult performed by: Juan Manuel العلي MD  Consult ordered by: Madelin Ortiz, APRN-CNP        Reason For Consult  Diabetes    History Of Present Illness  Yesy Ayala is a 68 y.o. female admitted yesterday for RSV infection and COPD    Patient uncertain about diagnosis of diabetes  She had a prescription for empagliflozin 2 months ago but never took it because it is too expensive    History taking limited by breathing treatment, in progress    She has never taken insulin  GI intolerance to metformin    Last available A1c 9.3% (11/2/23)    Medications    Current Facility-Administered Medications:     acetaminophen (Tylenol) tablet 975 mg, 975 mg, oral, q6h PRN, Flavia Morataya PA-C    albuterol 2.5 mg /3 mL (0.083 %) nebulizer solution 2.5 mg, 2.5 mg, nebulization, q2h PRN, Flavia Morataya PA-C, 2.5 mg at 01/15/24 2307    aspirin EC tablet 81 mg, 81 mg, oral, Daily, Flavia Morataya PA-C    benzonatate (Tessalon) capsule 100 mg, 100 mg, oral, TID PRN, Flavia Morataya PA-C    budesonide (Pulmicort) 0.5 mg/2 mL nebulizer solution 0.5 mg, 0.5 mg, nebulization, BID, Flavia Morataya PA-C, 0.5 mg at 01/15/24 2307    dextrose 10 % in water (D10W) infusion, 30 mL/hr, intravenous, Once PRN, Flavia Morataya PA-C    dextrose 50 % injection 25 g, 25 g, intravenous, q15 min PRN, Flavia Morataya, PA-C    docusate sodium (Colace) capsule 100 mg, 100 mg, oral, BID PRN, Flavia Morataya PA-C    [Held by provider] enoxaparin (Lovenox) syringe 30 mg, 30 mg, subcutaneous, q24h, Flavia Morataya PA-C    glucagon (Glucagen) injection 1 mg, 1 mg, intramuscular, q15 min PRN, Flavia Morataya PA-C    guaiFENesin (Mucinex) 12 hr tablet 600 mg, 600 mg, oral, BID, Flavia Morataya PA-C, 600 mg at 01/15/24 2302    insulin lispro (HumaLOG) injection 0-15 Units, 0-15 Units, subcutaneous, Before meals & nightly, Flavia Morataya PA-C, 15 Units at 01/16/24 0624    ipratropium-albuteroL (Duo-Neb) 0.5-2.5  mg/3 mL nebulizer solution 3 mL, 3 mL, nebulization, 4x daily, Flavia Morataya PA-C    melatonin tablet 3 mg, 3 mg, oral, Daily, Flavia Morataya PA-C    methylPREDNISolone sod succinate (SOLU-Medrol) injection 40 mg, 40 mg, intravenous, q8h, ASTON Meza-C, 40 mg at 01/16/24 0624    metoprolol tartrate (Lopressor) tablet 100 mg, 100 mg, oral, Nightly, ASTON Meza-C, 100 mg at 01/16/24 0436    pantoprazole (ProtoNix) EC tablet 40 mg, 40 mg, oral, Daily before breakfast, 40 mg at 01/16/24 0624 **OR** pantoprazole (ProtoNix) injection 40 mg, 40 mg, intravenous, Daily before breakfast, GRIS MezaC    rosuvastatin (Crestor) tablet 10 mg, 10 mg, oral, Nightly, Flavia Morataya PA-C    Current Outpatient Medications:     albuterol 90 mcg/actuation inhaler, Inhale 1 puff every 6 hours if needed for shortness of breath., Disp: 18 g, Rfl: 2    aspirin 81 mg EC tablet, Take 1 tablet (81 mg) by mouth once daily., Disp: , Rfl:     cholecalciferol (Vitamin D-3) 125 MCG (5000 UT) capsule, Take 1 capsule (125 mcg) by mouth once daily., Disp: , Rfl:     ipratropium-albuteroL (Duo-Neb) 0.5-2.5 mg/3 mL nebulizer solution, Take 3 mL by nebulization 4 times a day., Disp: , Rfl:     metoprolol tartrate (Lopressor) 100 mg tablet, TAKE 1/2 TABLET BY MOUTH TWICE DAILY (Patient taking differently: Take 1 tablet (100 mg) by mouth once daily at bedtime.), Disp: 45 tablet, Rfl: 3    rosuvastatin (Crestor) 40 mg tablet, TAKE 1 TABLET EVERYDAY AT BEDTIME, Disp: 90 tablet, Rfl: 3    Symbicort 160-4.5 mcg/actuation inhaler, INHALE 1-2 PUFFS 2 TIMES A DAY. RINSE MOUTH AFTER USE, Disp: 10.2 each, Rfl: 0    chlorhexidine (Peridex) 0.12 % solution, RINSE MOUTH WITH 15ML (1 CAPFUL) FOR 30 SECONDS IN MORNING AND EVENING AFTER BRUSHING, THEN SPIT, Disp: , Rfl:     empagliflozin (Jardiance) 10 mg, Take 1 tablet (10 mg) by mouth once daily., Disp: 90 tablet, Rfl: 3    losartan (Cozaar) 50 mg tablet, Take 1.5 tablets (75 mg) by  "mouth once daily., Disp: , Rfl:     nicotine (Nicoderm CQ) 21 mg/24 hr patch, Place 1 patch over 24 hours on the skin once every 24 hours., Disp: 30 patch, Rfl: 0     Past Medical History  She has a past medical history of Body mass index (BMI) 27.0-27.9, adult (01/18/2020), Other conditions influencing health status, Personal history of other diseases of the circulatory system, Personal history of other diseases of the circulatory system, Personal history of other diseases of the circulatory system, and Personal history of other endocrine, nutritional and metabolic disease.    Surgical History  She has a past surgical history that includes Other surgical history (08/22/2013); Other surgical history (08/22/2013); and Other surgical history (02/20/2014).     Social History  She reports that she has never smoked. She has never been exposed to tobacco smoke. She has never used smokeless tobacco. She reports that she does not drink alcohol. No history on file for drug use.    Family History  No family history on file.    Allergies  Penicillin, Metformin, and Sulfa (sulfonamide antibiotics)    Review of Systems   Constitutional:  Negative for unexpected weight change.   Respiratory:  Positive for cough and shortness of breath.    Endocrine:        As above         Last Recorded Vitals  Blood pressure (!) 174/100, pulse 69, temperature 37 °C (98.6 °F), resp. rate 18, height 1.499 m (4' 11\"), weight 57.6 kg (127 lb), SpO2 100 %.    Physical Exam  Constitutional:       General: She is not in acute distress.  HENT:      Head: Normocephalic.   Eyes:      Extraocular Movements: Extraocular movements intact.   Musculoskeletal:      Right lower leg: No edema.      Left lower leg: No edema.   Neurological:      Mental Status: She is alert.      Motor: No tremor.   Psychiatric:         Mood and Affect: Affect normal.          Relevant Results  Lab Results   Component Value Date    POCGLU 417 (H) 01/16/2024    GLUCOSE 663 (HH) " 01/16/2024    GLUCOSE 318 (H) 01/15/2024    GLUCOSE 223 (H) 11/02/2023    GLUCOSE 122 (H) 11/22/2022    GLUCOSE 142 (H) 02/08/2022      Latest Reference Range & Units 01/15/24 17:22 01/16/24 04:40   GLUCOSE 74 - 99 mg/dL 318 (H) 663 (HH)   SODIUM 136 - 145 mmol/L 139 132 (L)   POTASSIUM 3.5 - 5.3 mmol/L 3.5 3.2 (L)   CHLORIDE 98 - 107 mmol/L 103 97 (L)   Bicarbonate 21 - 32 mmol/L 27 20 (L)   Anion Gap 10 - 20 mmol/L 13 18   Blood Urea Nitrogen 6 - 23 mg/dL 14 17   Creatinine 0.50 - 1.05 mg/dL 1.65 (H) 1.79 (H)   EGFR >60 mL/min/1.73m*2 34 (L) 31 (L)   Calcium 8.6 - 10.3 mg/dL 9.2 8.6         IMPRESSION  TYPE 2 DIABETES MELLITUS WITH HYPERGLYCEMIA  Patient presented with hyperglycemia, further exacerbated by steroid  A1c 9.3% as of 2 months ago indicates chronic hyperglycemia  Patient uncertain of duration of diabetes diagnosis  A1c history suggests at least 4 years    RECOMMENDATIONS  NPH 12 units subcutaneous q12 hours today  Continue lispro scale  Advised she will require insulin at discharge, to be determined          Juan Manuel العلي MD

## 2024-01-16 NOTE — PROGRESS NOTES
Medication Education     Medication education for Yesy Ayala was provided to the patient  for the following medication(s):  Insulin      Medication education provided by a Pharmacist:  ADR Counseling Alternative method of administration Dose, frequency, storage How to take and what to do if a dose is missed Proper dose, indication, possible ADRs How the medication works and benefits of taking it Benefits of taking the medication  Importance of compliance Proper storage of the medication(s)    Identified potential barriers to education:  None    Method(s) of Education:  Verbal Demonstration Written materials provided and reviewed    An opportunity to ask questions and receive answers was provided.     Assessment of understanding the patient :  2= meets goals/outcomes    Additional Notes (if applicable):     Dionicio Eid, PharmD

## 2024-01-16 NOTE — PROGRESS NOTES
Home alone      01/16/24 0734   Current Planned Discharge Disposition   Current Planned Discharge Disposition Home

## 2024-01-16 NOTE — H&P
History Of Present Illness  Yesy Ayala is a 68 y.o. female with PMH significant for COPD, DM, HLD, HTN, PVD who presented to the ED with  shortness of breath for the past 3 days. She has been using inhalers at home without any relief. She states these symptoms are similar to COPD exacerbations she has had in the past. She also complains of a dry cough, headache, and increased fatigue. Denies CP, fever, chills, cough, N/V/D. No h/o PE/DVT.     ED course: EKG showed NSR, HR 93, no ischemic ST changes. Lab work showed CBC without leukocytosis, CMP with low platelets at 65, VBG unremarkable. PT-INR normal. Elevated HS Troponin at 46. Positive RSV PCR. Negative COVID. CXR was negative. Pt was given DuoNebs x3 and Solu-Medrol with continued tachypnea and wheezing. Pt was admitted for further evaluation and management of COPD exacerbation.     Past Medical History  Past Medical History:   Diagnosis Date    Body mass index (BMI) 27.0-27.9, adult 01/18/2020    BMI 27.0-27.9,adult    Other conditions influencing health status     Coronary Artery Disease    Personal history of other diseases of the circulatory system     History of hypertension    Personal history of other diseases of the circulatory system     History of intermittent claudication    Personal history of other diseases of the circulatory system     History of peripheral vascular disease    Personal history of other endocrine, nutritional and metabolic disease     History of hyperlipidemia       Surgical History  Past Surgical History:   Procedure Laterality Date    OTHER SURGICAL HISTORY  08/22/2013    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    OTHER SURGICAL HISTORY  08/22/2013    Bypass Graft (Non-Vein) Aortic-bifemoral    OTHER SURGICAL HISTORY  02/20/2014    Previous Stent Placement       Social History  Social History     Socioeconomic History    Marital status: Single     Spouse name: Not on file    Number of children: Not on file     Years of education: Not on file    Highest education level: Not on file   Occupational History    Not on file   Tobacco Use    Smoking status: Never     Passive exposure: Never    Smokeless tobacco: Never   Substance and Sexual Activity    Alcohol use: Never    Drug use: Not on file    Sexual activity: Not on file   Other Topics Concern    Not on file   Social History Narrative    Not on file     Social Determinants of Health     Financial Resource Strain: Not on file   Food Insecurity: Not on file   Transportation Needs: Not on file   Physical Activity: Not on file   Stress: Not on file   Social Connections: Not on file   Intimate Partner Violence: Not on file   Housing Stability: Not on file        Family History  No family history on file.     Allergies  Allergies as of 01/15/2024 - Reviewed 01/15/2024   Allergen Reaction Noted    Penicillin Anaphylaxis 10/16/2023    Metformin Dizziness and Nausea Only 10/16/2023    Sulfa (sulfonamide antibiotics) Hives, Itching, and Rash 10/16/2023        Review of Systems  Review of Systems   Constitutional:  Positive for fatigue. Negative for chills and fever.   HENT:  Negative for sore throat.    Eyes:  Negative for pain.   Respiratory:  Positive for cough, shortness of breath and wheezing.    Cardiovascular:  Negative for chest pain and leg swelling.   Gastrointestinal:  Negative for diarrhea, nausea and vomiting.   Genitourinary:  Negative for dysuria and pelvic pain.   Musculoskeletal:  Negative for myalgias.   Skin:  Negative for rash.   Neurological:  Positive for headaches. Negative for weakness and numbness.   Psychiatric/Behavioral:  Negative for confusion and hallucinations.        Relevant results reviewed   PROVIDER notes  Nursing notes  MEDS:  Current Facility-Administered Medications   Medication Dose Route Frequency Provider Last Rate Last Admin    acetaminophen (Tylenol) tablet 975 mg  975 mg oral q6h PRN Flavia Morataya PA-C        albuterol 2.5 mg /3 mL  (0.083 %) nebulizer solution 2.5 mg  2.5 mg nebulization q2h PRN Flavia Morataya PA-C   2.5 mg at 01/15/24 2307    aspirin EC tablet 81 mg  81 mg oral Daily Flavia Morataya PA-C        benzonatate (Tessalon) capsule 100 mg  100 mg oral TID PRN Flavia Morataya PA-C        budesonide (Pulmicort) 0.5 mg/2 mL nebulizer solution 0.5 mg  0.5 mg nebulization BID Flavia Morataya PA-C   0.5 mg at 01/15/24 2307    dextrose 10 % in water (D10W) infusion  30 mL/hr intravenous Once PRN Flavia Morataya PA-C        dextrose 50 % injection 25 g  25 g intravenous q15 min PRN Flavia Morataya PA-C        docusate sodium (Colace) capsule 100 mg  100 mg oral BID PRN Flavia Morataya PA-C        [Held by provider] enoxaparin (Lovenox) syringe 30 mg  30 mg subcutaneous q24h Flavia Morataya PA-C        glucagon (Glucagen) injection 1 mg  1 mg intramuscular q15 min PRN Flavia Morataya PA-C        guaiFENesin (Mucinex) 12 hr tablet 600 mg  600 mg oral BID Flavia Morataya PA-C   600 mg at 01/15/24 2302    insulin lispro (HumaLOG) injection 0-5 Units  0-5 Units subcutaneous Before meals & nightly Flavia Morataya PA-C        ipratropium-albuteroL (Duo-Neb) 0.5-2.5 mg/3 mL nebulizer solution 3 mL  3 mL nebulization 4x daily Flavia Morataya PA-C        melatonin tablet 3 mg  3 mg oral Daily Flavia Morataya PA-C        metoprolol tartrate (Lopressor) tablet 100 mg  100 mg oral Nightly Flavia Morataya PA-C   100 mg at 01/16/24 0436    pantoprazole (ProtoNix) EC tablet 40 mg  40 mg oral Daily before breakfast Flavia Morataya PA-C        Or    pantoprazole (ProtoNix) injection 40 mg  40 mg intravenous Daily before breakfast Flavia Morataya PA-C        predniSONE (Deltasone) tablet 40 mg  40 mg oral Daily Flavia Morataya PA-C        rosuvastatin (Crestor) tablet 10 mg  10 mg oral Nightly Flavia Morataya PA-C         Current Outpatient Medications   Medication Sig Dispense Refill    albuterol 90 mcg/actuation inhaler Inhale 1 puff  every 6 hours if needed for shortness of breath. 18 g 2    aspirin 81 mg EC tablet Take 1 tablet (81 mg) by mouth once daily.      cholecalciferol (Vitamin D-3) 125 MCG (5000 UT) capsule Take 1 capsule (125 mcg) by mouth once daily.      ipratropium-albuteroL (Duo-Neb) 0.5-2.5 mg/3 mL nebulizer solution Take 3 mL by nebulization 4 times a day.      metoprolol tartrate (Lopressor) 100 mg tablet TAKE 1/2 TABLET BY MOUTH TWICE DAILY (Patient taking differently: Take 1 tablet (100 mg) by mouth once daily at bedtime.) 45 tablet 3    rosuvastatin (Crestor) 40 mg tablet TAKE 1 TABLET EVERYDAY AT BEDTIME 90 tablet 3    Symbicort 160-4.5 mcg/actuation inhaler INHALE 1-2 PUFFS 2 TIMES A DAY. RINSE MOUTH AFTER USE 10.2 each 0    chlorhexidine (Peridex) 0.12 % solution RINSE MOUTH WITH 15ML (1 CAPFUL) FOR 30 SECONDS IN MORNING AND EVENING AFTER BRUSHING, THEN SPIT      empagliflozin (Jardiance) 10 mg Take 1 tablet (10 mg) by mouth once daily. 90 tablet 3    losartan (Cozaar) 50 mg tablet Take 1.5 tablets (75 mg) by mouth once daily.      nicotine (Nicoderm CQ) 21 mg/24 hr patch Place 1 patch over 24 hours on the skin once every 24 hours. 30 patch 0      LABS:  Results for orders placed or performed during the hospital encounter of 01/15/24 (from the past 24 hour(s))   Blood Gas, Venous Full Panel   Result Value Ref Range    POCT pH, Venous 7.37 7.33 - 7.43 pH    POCT pCO2, Venous 51 41 - 51 mm Hg    POCT pO2, Venous 25 (L) 35 - 45 mm Hg    POCT SO2, Venous 23 (L) 45 - 75 %    POCT Oxy Hemoglobin, Venous 22.3 (L) 45.0 - 75.0 %    POCT Hematocrit Calculated, Venous 44.0 36.0 - 46.0 %    POCT Sodium, Venous 138 136 - 145 mmol/L    POCT Potassium, Venous 3.8 3.5 - 5.3 mmol/L    POCT Chloride, Venous 104 98 - 107 mmol/L    POCT Ionized Calicum, Venous 1.25 1.10 - 1.33 mmol/L    POCT Glucose, Venous 354 (H) 74 - 99 mg/dL    POCT Lactate, Venous 2.9 (H) 0.4 - 2.0 mmol/L    POCT Base Excess, Venous 3.0 -2.0 - 3.0 mmol/L    POCT HCO3  Calculated, Venous 29.5 (H) 22.0 - 26.0 mmol/L    POCT Hemoglobin, Venous 14.8 12.0 - 16.0 g/dL    POCT Anion Gap, Venous 8.0 (L) 10.0 - 25.0 mmol/L    Patient Temperature 37.0 degrees Celsius    FiO2 21 %   CBC and Auto Differential   Result Value Ref Range    WBC 8.1 4.4 - 11.3 x10*3/uL    nRBC 0.0 0.0 - 0.0 /100 WBCs    RBC 6.82 (H) 4.00 - 5.20 x10*6/uL    Hemoglobin 14.5 12.0 - 16.0 g/dL    Hematocrit 45.6 36.0 - 46.0 %    MCV 67 (L) 80 - 100 fL    MCH 21.3 (L) 26.0 - 34.0 pg    MCHC 31.8 (L) 32.0 - 36.0 g/dL    RDW 19.8 (H) 11.5 - 14.5 %    Platelets 65 (L) 150 - 450 x10*3/uL    Immature Granulocytes %, Automated 0.2 0.0 - 0.9 %    Immature Granulocytes Absolute, Automated 0.02 0.00 - 0.70 x10*3/uL   Comprehensive metabolic panel   Result Value Ref Range    Glucose 318 (H) 74 - 99 mg/dL    Sodium 139 136 - 145 mmol/L    Potassium 3.5 3.5 - 5.3 mmol/L    Chloride 103 98 - 107 mmol/L    Bicarbonate 27 21 - 32 mmol/L    Anion Gap 13 10 - 20 mmol/L    Urea Nitrogen 14 6 - 23 mg/dL    Creatinine 1.65 (H) 0.50 - 1.05 mg/dL    eGFR 34 (L) >60 mL/min/1.73m*2    Calcium 9.2 8.6 - 10.3 mg/dL    Albumin 3.8 3.4 - 5.0 g/dL    Alkaline Phosphatase 106 33 - 136 U/L    Total Protein 7.1 6.4 - 8.2 g/dL    AST 20 9 - 39 U/L    Bilirubin, Total 1.0 0.0 - 1.2 mg/dL    ALT 15 7 - 45 U/L   Protime-INR   Result Value Ref Range    Protime 12.1 9.8 - 12.8 seconds    INR 1.1 0.9 - 1.1   aPTT   Result Value Ref Range    aPTT 27 27 - 38 seconds   Troponin I, High Sensitivity   Result Value Ref Range    Troponin I, High Sensitivity 46 (H) 0 - 13 ng/L   Manual Differential   Result Value Ref Range    Neutrophils %, Manual 60.0 40.0 - 80.0 %    Lymphocytes %, Manual 34.0 13.0 - 44.0 %    Monocytes %, Manual 2.0 2.0 - 10.0 %    Eosinophils %, Manual 0.0 0.0 - 6.0 %    Basophils %, Manual 0.0 0.0 - 2.0 %    Atypical Lymphocytes %, Manual 4.0 0.0 - 2.0 %    Seg Neutrophils Absolute, Manual 4.86 1.20 - 7.00 x10*3/uL    Lymphocytes Absolute,  "Manual 2.75 1.20 - 4.80 x10*3/uL    Monocytes Absolute, Manual 0.16 0.10 - 1.00 x10*3/uL    Eosinophils Absolute, Manual 0.00 0.00 - 0.70 x10*3/uL    Basophils Absolute, Manual 0.00 0.00 - 0.10 x10*3/uL    Atypical Lymphs Absolute, Manual 0.32 0.00 - 0.50 x10*3/uL    Total Cells Counted 100     RBC Morphology See Below     Target Cells Few     Ovalocytes Few     Tim Cells Few    Blood Gas Lactic Acid, Venous   Result Value Ref Range    POCT Lactate, Venous 3.5 (H) 0.4 - 2.0 mmol/L   RSV PCR   Result Value Ref Range    RSV PCR Detected (A) Not Detected   Sars-CoV-2 and Influenza A/B PCR   Result Value Ref Range    Flu A Result Not Detected Not Detected    Flu B Result Not Detected Not Detected    Coronavirus 2019, PCR Not Detected Not Detected   CBC   Result Value Ref Range    WBC 8.0 4.4 - 11.3 x10*3/uL    nRBC 0.0 0.0 - 0.0 /100 WBCs    RBC 6.27 (H) 4.00 - 5.20 x10*6/uL    Hemoglobin 13.3 12.0 - 16.0 g/dL    Hematocrit 41.5 36.0 - 46.0 %    MCV 66 (L) 80 - 100 fL    MCH 21.2 (L) 26.0 - 34.0 pg    MCHC 32.0 32.0 - 36.0 g/dL    RDW 18.8 (H) 11.5 - 14.5 %    Platelets 71 (L) 150 - 450 x10*3/uL   Basic metabolic panel   Result Value Ref Range    Glucose 663 (HH) 74 - 99 mg/dL    Sodium 132 (L) 136 - 145 mmol/L    Potassium 3.2 (L) 3.5 - 5.3 mmol/L    Chloride 97 (L) 98 - 107 mmol/L    Bicarbonate 20 (L) 21 - 32 mmol/L    Anion Gap 18 10 - 20 mmol/L    Urea Nitrogen 17 6 - 23 mg/dL    Creatinine 1.79 (H) 0.50 - 1.05 mg/dL    eGFR 31 (L) >60 mL/min/1.73m*2    Calcium 8.6 8.6 - 10.3 mg/dL      IMAGING:  XR chest 2 views   Final Result   No acute pulmonary abnormality.   Signed by Ceasar Davies MD             PHYSICAL EXAM  /76   Pulse 93   Temp 37 °C (98.6 °F)   Resp 24   Ht 1.499 m (4' 11\")   Wt 57.6 kg (127 lb)   SpO2 100%   BMI 25.65 kg/m²   PHYSICAL EXAM:  GENERAL: Alert, NAD, cooperative  SKIN: Warm and dry.  No suspicious lesions or rashes.  HEENT:  NCAT, PERRLA, EOMI, nonicteric sclera, MMM, neck " supple, trachea midline  LUNGS: Tachypnea with diffuse inspiratory and expiratory wheezing.   CARDS: RRR, no m/g/r appreciated  GI: Soft, NTND, BS+, no rebound, no guarding   : no rock, voids independently   MS/Extremities: WWP, no edema, distal pulses intact, moves all extremities  NEURO: A&Ox3, grossly nonfocal exam, speech fluent, follows commands, answers questions appropriately  PSYCH: mood and behavior appropriate    Assessment/Plan:   Principal Problem:    COPD exacerbation (CMS/[facility])  Active Problems:    COPD (chronic obstructive pulmonary disease) (CMS/[facility])    Diabetes mellitus (CMS/[facility])    Hyperlipidemia    Hypertension    Peripheral vascular disease (CMS/[facility])    RSV (acute bronchiolitis due to respiratory syncytial virus)     Acute COPD exacerbation  RSV  -CBC: thrombocytopenia at 65, has been low in the past, but lower than previous --> likely in the setting of acute illness, no leukocytosis, no acute anemia  -CMP: no acute electrolyte abnormalities, BUN/creatinine elevated, but stable with baseline CKD, or liver dysfunction appreciated   -HS ISMA: elevated at 46 --> in the CKD, as well as possible component of demand ischemia in setting of COPD exacerbation, clinically not consistent with ACS as no chest pain, non-ischemic EKG  -CXR: negative  -clinical presentation consistent with COPD exacerbation in setting of RSV infection   -BPH, anti-tussives, anti-mucolytics PRN symptom management   -duonebs, budesonide nebulizer, RT consult  -received IV solumedrol in the ER --> will continue IV solumedrol or now, wean to oral prednisone as appropriate   -currently no indication for antibiotics at this time  -referral to COPD clinic    HLD  HTN  Type 2 DM  -resume home meds as appropriate to optimize BP control   -recent Hba1c 9.3 (11/2/23) -- prescribed Jardiance recently but has not started taking   -glucose on CMP this  -- > --> will start ISS3 ACHS  -suspect hyperglycemic result of untreated  diabetes and compounded with IV steroids  -consider Endo consult and diabetes education     GI prophylaxis  -Protonix. Bowel regimen    VTE prophylaxis  -Lovenox held - CMP showed low platelets at 65  -SCDs    Emily CLANCY-S  Not finalized until co-signed      I was present with a PA student who participated in the documentation of this note. I personally saw and evaluated the patient and performed my own history and examination.  I discussed the case with the PA student. I have reviewed, verified, and revised the note as necessary and agree with the content and plan as written by the PA student, with the additional addendums noted in bold above as appropriate.     GRIS MezaC      Total time spent [including but not limited to]: Obtaining and reviewing patient medical records/history, obtaining a separate history,  examining and assessing the patient, providing  and education, placing pertinent orders for labs/tests/medications,  communicating with the patient/family/health team, documenting in the patient's EMR/formulation of this note, independently interpreting results and data, coordinating care:   55 minutes, with greater than 50% spent in personal discussion with patient and/or family

## 2024-01-16 NOTE — PROGRESS NOTES
Transitional Care Coordination Progress Note:  Plan per Medical/Surgical team: treatment of RSV, COPD with inhalers, Duoneb, IV solumedrol  Status: observation  Payor source: [facility]  Discharge disposition: home alone  Potential Barriers: /100, glucose 663, K 3.2  ADOD: 1/18/2024  RUSSELL Sheets RN, BSN Transitional Care Coordinator ED# [phone]      01/16/24 0735   Discharge Planning   Living Arrangements Alone   Support Systems Family members   Assistance Needed pulm work up   Type of Residence Private residence   Number of Stairs to Enter Residence 20   Number of Stairs Within Residence 0   Home or Post Acute Services None   Patient expects to be discharged to: Home alone   Does the patient need discharge transport arranged? Yes   RoundTrip coordination needed? Yes   Has discharge transport been arranged? No   Financial Resource Strain   How hard is it for you to pay for the very basics like food, housing, medical care, and heating? Not hard   Housing Stability   In the last 12 months, was there a time when you were not able to pay the mortgage or rent on time? N   In the last 12 months, how many places have you lived? 1   In the last 12 months, was there a time when you did not have a steady place to sleep or slept in a shelter (including now)? N   Transportation Needs   In the past 12 months, has lack of transportation kept you from medical appointments or from getting medications? no   In the past 12 months, has lack of transportation kept you from meetings, work, or from getting things needed for daily living? No

## 2024-01-17 LAB
ANION GAP SERPL CALC-SCNC: 14 MMOL/L (ref 10–20)
BUN SERPL-MCNC: 22 MG/DL (ref 6–23)
CALCIUM SERPL-MCNC: 9.3 MG/DL (ref 8.6–10.3)
CHLORIDE SERPL-SCNC: 105 MMOL/L (ref 98–107)
CO2 SERPL-SCNC: 23 MMOL/L (ref 21–32)
CREAT SERPL-MCNC: 1.67 MG/DL (ref 0.5–1.05)
EGFRCR SERPLBLD CKD-EPI 2021: 33 ML/MIN/1.73M*2
ERYTHROCYTE [DISTWIDTH] IN BLOOD BY AUTOMATED COUNT: 18.7 % (ref 11.5–14.5)
GLUCOSE BLD MANUAL STRIP-MCNC: 263 MG/DL (ref 74–99)
GLUCOSE BLD MANUAL STRIP-MCNC: 281 MG/DL (ref 74–99)
GLUCOSE BLD MANUAL STRIP-MCNC: 383 MG/DL (ref 74–99)
GLUCOSE BLD MANUAL STRIP-MCNC: 419 MG/DL (ref 74–99)
GLUCOSE SERPL-MCNC: 176 MG/DL (ref 74–99)
HCT VFR BLD AUTO: 38.4 % (ref 36–46)
HGB BLD-MCNC: 12.5 G/DL (ref 12–16)
MCH RBC QN AUTO: 21.3 PG (ref 26–34)
MCHC RBC AUTO-ENTMCNC: 32.6 G/DL (ref 32–36)
MCV RBC AUTO: 65 FL (ref 80–100)
NRBC BLD-RTO: 0 /100 WBCS (ref 0–0)
PLATELET # BLD AUTO: 76 X10*3/UL (ref 150–450)
POTASSIUM SERPL-SCNC: 3.7 MMOL/L (ref 3.5–5.3)
RBC # BLD AUTO: 5.88 X10*6/UL (ref 4–5.2)
SODIUM SERPL-SCNC: 138 MMOL/L (ref 136–145)
WBC # BLD AUTO: 21.7 X10*3/UL (ref 4.4–11.3)

## 2024-01-17 PROCEDURE — 36415 COLL VENOUS BLD VENIPUNCTURE: CPT | Performed by: INTERNAL MEDICINE

## 2024-01-17 PROCEDURE — 80048 BASIC METABOLIC PNL TOTAL CA: CPT | Performed by: INTERNAL MEDICINE

## 2024-01-17 PROCEDURE — 2500000005 HC RX 250 GENERAL PHARMACY W/O HCPCS: Performed by: INTERNAL MEDICINE

## 2024-01-17 PROCEDURE — 99231 SBSQ HOSP IP/OBS SF/LOW 25: CPT | Performed by: INTERNAL MEDICINE

## 2024-01-17 PROCEDURE — 2500000002 HC RX 250 W HCPCS SELF ADMINISTERED DRUGS (ALT 637 FOR MEDICARE OP, ALT 636 FOR OP/ED): Performed by: INTERNAL MEDICINE

## 2024-01-17 PROCEDURE — 2500000001 HC RX 250 WO HCPCS SELF ADMINISTERED DRUGS (ALT 637 FOR MEDICARE OP): Performed by: NURSE PRACTITIONER

## 2024-01-17 PROCEDURE — 2500000002 HC RX 250 W HCPCS SELF ADMINISTERED DRUGS (ALT 637 FOR MEDICARE OP, ALT 636 FOR OP/ED): Performed by: PHYSICIAN ASSISTANT

## 2024-01-17 PROCEDURE — 85027 COMPLETE CBC AUTOMATED: CPT | Performed by: INTERNAL MEDICINE

## 2024-01-17 PROCEDURE — 94640 AIRWAY INHALATION TREATMENT: CPT

## 2024-01-17 PROCEDURE — 99233 SBSQ HOSP IP/OBS HIGH 50: CPT | Performed by: INTERNAL MEDICINE

## 2024-01-17 PROCEDURE — 2500000004 HC RX 250 GENERAL PHARMACY W/ HCPCS (ALT 636 FOR OP/ED): Performed by: INTERNAL MEDICINE

## 2024-01-17 PROCEDURE — 2500000004 HC RX 250 GENERAL PHARMACY W/ HCPCS (ALT 636 FOR OP/ED): Performed by: PHYSICIAN ASSISTANT

## 2024-01-17 PROCEDURE — 2500000001 HC RX 250 WO HCPCS SELF ADMINISTERED DRUGS (ALT 637 FOR MEDICARE OP): Performed by: PHYSICIAN ASSISTANT

## 2024-01-17 PROCEDURE — 94760 N-INVAS EAR/PLS OXIMETRY 1: CPT

## 2024-01-17 PROCEDURE — 82947 ASSAY GLUCOSE BLOOD QUANT: CPT

## 2024-01-17 PROCEDURE — 1200000002 HC GENERAL ROOM WITH TELEMETRY DAILY

## 2024-01-17 RX ORDER — IPRATROPIUM BROMIDE AND ALBUTEROL SULFATE 2.5; .5 MG/3ML; MG/3ML
3 SOLUTION RESPIRATORY (INHALATION)
Status: DISCONTINUED | OUTPATIENT
Start: 2024-01-17 | End: 2024-01-25 | Stop reason: HOSPADM

## 2024-01-17 RX ADMIN — ALBUTEROL SULFATE 2.5 MG: 2.5 SOLUTION RESPIRATORY (INHALATION) at 16:55

## 2024-01-17 RX ADMIN — IPRATROPIUM BROMIDE AND ALBUTEROL SULFATE 3 ML: 2.5; .5 SOLUTION RESPIRATORY (INHALATION) at 20:24

## 2024-01-17 RX ADMIN — ROSUVASTATIN 10 MG: 10 TABLET, FILM COATED ORAL at 20:12

## 2024-01-17 RX ADMIN — ASPIRIN 81 MG: 81 TABLET, COATED ORAL at 09:34

## 2024-01-17 RX ADMIN — AMLODIPINE BESYLATE 10 MG: 10 TABLET ORAL at 09:35

## 2024-01-17 RX ADMIN — INSULIN HUMAN 12 UNITS: 100 INJECTION, SUSPENSION SUBCUTANEOUS at 21:47

## 2024-01-17 RX ADMIN — PANTOPRAZOLE SODIUM 40 MG: 40 TABLET, DELAYED RELEASE ORAL at 06:13

## 2024-01-17 RX ADMIN — BUDESONIDE 0.5 MG: 0.5 INHALANT ORAL at 07:25

## 2024-01-17 RX ADMIN — METOPROLOL TARTRATE 100 MG: 50 TABLET, FILM COATED ORAL at 20:11

## 2024-01-17 RX ADMIN — Medication 3 MG: at 20:12

## 2024-01-17 RX ADMIN — METHYLPREDNISOLONE SODIUM SUCCINATE 40 MG: 40 INJECTION, POWDER, FOR SOLUTION INTRAMUSCULAR; INTRAVENOUS at 17:46

## 2024-01-17 RX ADMIN — METHYLPREDNISOLONE SODIUM SUCCINATE 40 MG: 125 INJECTION, POWDER, FOR SOLUTION INTRAMUSCULAR; INTRAVENOUS at 06:13

## 2024-01-17 RX ADMIN — INSULIN LISPRO 15 UNITS: 100 INJECTION, SOLUTION INTRAVENOUS; SUBCUTANEOUS at 21:47

## 2024-01-17 RX ADMIN — INSULIN LISPRO 9 UNITS: 100 INJECTION, SOLUTION INTRAVENOUS; SUBCUTANEOUS at 16:15

## 2024-01-17 RX ADMIN — BUDESONIDE 0.5 MG: 0.5 INHALANT ORAL at 20:24

## 2024-01-17 RX ADMIN — GUAIFENESIN 600 MG: 600 TABLET, EXTENDED RELEASE ORAL at 20:12

## 2024-01-17 RX ADMIN — IPRATROPIUM BROMIDE AND ALBUTEROL SULFATE 3 ML: 2.5; .5 SOLUTION RESPIRATORY (INHALATION) at 07:25

## 2024-01-17 RX ADMIN — Medication 2 L/MIN: at 20:24

## 2024-01-17 RX ADMIN — INSULIN LISPRO 15 UNITS: 100 INJECTION, SOLUTION INTRAVENOUS; SUBCUTANEOUS at 12:36

## 2024-01-17 RX ADMIN — INSULIN HUMAN 12 UNITS: 100 INJECTION, SUSPENSION SUBCUTANEOUS at 09:34

## 2024-01-17 RX ADMIN — GUAIFENESIN 600 MG: 600 TABLET, EXTENDED RELEASE ORAL at 09:34

## 2024-01-17 ASSESSMENT — COGNITIVE AND FUNCTIONAL STATUS - GENERAL
WALKING IN HOSPITAL ROOM: A LITTLE
MOBILITY SCORE: 21
CLIMB 3 TO 5 STEPS WITH RAILING: A LOT
MOBILITY SCORE: 21
CLIMB 3 TO 5 STEPS WITH RAILING: A LOT
DAILY ACTIVITIY SCORE: 24
WALKING IN HOSPITAL ROOM: A LITTLE
DAILY ACTIVITIY SCORE: 24

## 2024-01-17 ASSESSMENT — PAIN SCALES - GENERAL
PAINLEVEL_OUTOF10: 0 - NO PAIN

## 2024-01-17 ASSESSMENT — PAIN - FUNCTIONAL ASSESSMENT: PAIN_FUNCTIONAL_ASSESSMENT: 0-10

## 2024-01-17 NOTE — CARE PLAN
Problem: Pain - Adult  Goal: Verbalizes/displays adequate comfort level or baseline comfort level  Outcome: Progressing     Problem: Safety - Adult  Goal: Free from fall injury  Outcome: Progressing     Problem: Discharge Planning  Goal: Discharge to home or other facility with appropriate resources  Outcome: Progressing     Problem: Chronic Conditions and Co-morbidities  Goal: Patient's chronic conditions and co-morbidity symptoms are monitored and maintained or improved  Outcome: Progressing     Problem: Diabetes  Goal: Achieve decreasing blood glucose levels by end of shift  Outcome: Progressing  Goal: Increase stability of blood glucose readings by end of shift  Outcome: Progressing  Goal: Decrease in ketones present in urine by end of shift  Outcome: Progressing  Goal: Maintain electrolyte levels within acceptable range throughout shift  Outcome: Progressing  Goal: Maintain glucose levels >70mg/dl to <250mg/dl throughout shift  Outcome: Progressing  Goal: No changes in neurological exam by end of shift  Outcome: Progressing  Goal: Learn about and adhere to nutrition recommendations by end of shift  Outcome: Progressing  Goal: Vital signs within normal range for age by end of shift  Outcome: Progressing  Goal: Increase self care and/or family involovement by end of shift  Outcome: Progressing  Goal: Receive DSME education by end of shift  Outcome: Progressing     Problem: Respiratory  Goal: Minimal/no exertional discomfort or dyspnea this shift  Outcome: Progressing  Goal: No signs of respiratory distress (eg. Use of accessory muscles. Peds grunting)  Outcome: Progressing

## 2024-01-17 NOTE — CARE PLAN
The clinical goals for the shift include p twill remain free of discomfort    Problem: Pain - Adult  Goal: Verbalizes/displays adequate comfort level or baseline comfort level  Outcome: Progressing     Problem: Safety - Adult  Goal: Free from fall injury  Outcome: Progressing     Problem: Respiratory  Goal: Minimal/no exertional discomfort or dyspnea this shift  Outcome: Progressing     Over the shift, the patient did not make progress toward the following goals. Barriers to progression include elevated CBG.     Problem: Diabetes  Goal: Increase stability of blood glucose readings by end of shift  Outcome: Not Progressing  Goal: Maintain glucose levels >70mg/dl to <250mg/dl throughout shift  Outcome: Not Progressing

## 2024-01-17 NOTE — PROGRESS NOTES
"Yesy Ayala is a 68 y.o. female on day 1 of admission presenting with COPD exacerbation (CMS/HCC).    Subjective   Patient states has significant dyspnea on exertion with just walking to the bathroom, I will decrease steroids today to 40 mg twice daily.  On room air.       Objective     Physical Exam  Constitutional:       Appearance: Normal appearance.   HENT:      Head: Normocephalic.      Nose: Nose normal.      Mouth/Throat:      Mouth: Mucous membranes are moist.   Cardiovascular:      Rate and Rhythm: Normal rate and regular rhythm.      Pulses: Normal pulses.      Heart sounds: Normal heart sounds.   Pulmonary:      Effort: Pulmonary effort is normal.      Breath sounds: Normal breath sounds.   Abdominal:      General: Abdomen is flat. Bowel sounds are normal.      Palpations: Abdomen is soft.   Skin:     General: Skin is warm.      Capillary Refill: Capillary refill takes less than 2 seconds.   Neurological:      General: No focal deficit present.      Mental Status: She is alert.         Last Recorded Vitals  Blood pressure 124/61, pulse 76, temperature 36.7 °C (98 °F), temperature source Temporal, resp. rate 18, height 1.499 m (4' 11\"), weight 57.6 kg (127 lb), SpO2 93 %.  Intake/Output last 3 Shifts:  I/O last 3 completed shifts:  In: 50 (0.9 mL/kg) [I.V.:50 (0.9 mL/kg)]  Out: - (0 mL/kg)   Weight: 57.6 kg     Relevant Results  Results for orders placed or performed during the hospital encounter of 01/15/24 (from the past 24 hour(s))   POCT GLUCOSE   Result Value Ref Range    POCT Glucose 143 (H) 74 - 99 mg/dL   CBC   Result Value Ref Range    WBC 19.8 (H) 4.4 - 11.3 x10*3/uL    nRBC 0.0 0.0 - 0.0 /100 WBCs    RBC 6.05 (H) 4.00 - 5.20 x10*6/uL    Hemoglobin 12.6 12.0 - 16.0 g/dL    Hematocrit 39.6 36.0 - 46.0 %    MCV 66 (L) 80 - 100 fL    MCH 20.8 (L) 26.0 - 34.0 pg    MCHC 31.8 (L) 32.0 - 36.0 g/dL    RDW 18.7 (H) 11.5 - 14.5 %    Platelets 73 (L) 150 - 450 x10*3/uL   POCT GLUCOSE   Result Value Ref " Range    POCT Glucose 513 (H) 74 - 99 mg/dL   CBC   Result Value Ref Range    WBC 21.7 (H) 4.4 - 11.3 x10*3/uL    nRBC 0.0 0.0 - 0.0 /100 WBCs    RBC 5.88 (H) 4.00 - 5.20 x10*6/uL    Hemoglobin 12.5 12.0 - 16.0 g/dL    Hematocrit 38.4 36.0 - 46.0 %    MCV 65 (L) 80 - 100 fL    MCH 21.3 (L) 26.0 - 34.0 pg    MCHC 32.6 32.0 - 36.0 g/dL    RDW 18.7 (H) 11.5 - 14.5 %    Platelets 76 (L) 150 - 450 x10*3/uL   Basic metabolic panel   Result Value Ref Range    Glucose 176 (H) 74 - 99 mg/dL    Sodium 138 136 - 145 mmol/L    Potassium 3.7 3.5 - 5.3 mmol/L    Chloride 105 98 - 107 mmol/L    Bicarbonate 23 21 - 32 mmol/L    Anion Gap 14 10 - 20 mmol/L    Urea Nitrogen 22 6 - 23 mg/dL    Creatinine 1.67 (H) 0.50 - 1.05 mg/dL    eGFR 33 (L) >60 mL/min/1.73m*2    Calcium 9.3 8.6 - 10.3 mg/dL   POCT GLUCOSE   Result Value Ref Range    POCT Glucose 263 (H) 74 - 99 mg/dL       Imaging Results  Cxr: negative    Medications:  amLODIPine, 10 mg, oral, Daily  aspirin, 81 mg, oral, Daily  budesonide, 0.5 mg, nebulization, BID  [Held by provider] enoxaparin, 30 mg, subcutaneous, q24h  guaiFENesin, 600 mg, oral, BID  insulin lispro, 0-15 Units, subcutaneous, Before meals & nightly  insulin NPH (Isophane), 12 Units, subcutaneous, q12h  ipratropium-albuteroL, 3 mL, nebulization, 4x daily  melatonin, 3 mg, oral, Daily  methylPREDNISolone sodium succinate (PF), 40 mg, intravenous, q12h  metoprolol tartrate, 100 mg, oral, Nightly  pantoprazole, 40 mg, oral, Daily before breakfast   Or  pantoprazole, 40 mg, intravenous, Daily before breakfast  rosuvastatin, 10 mg, oral, Nightly       PRN medications: acetaminophen, albuterol, benzonatate, dextrose 10 % in water (D10W), dextrose, docusate sodium, glucagon, hydrALAZINE     Assessment/Plan   COPD exacerbation secondary to RSV  -Continue Pulmicort, Magaly, decreased Solu-Medrol to 40 mg every 12 hours    2.  Diabetes  -Significant hyperglycemia likely she will need to be discharged on insulin NPH,  currently at 12 units, endocrinology on board    3.  Hyperlipidemia  -Continue rosuvastatin    DVT Prophylaxis:  Lovenox subq        Renita Weaver MD  [facility] Medicine

## 2024-01-17 NOTE — PROGRESS NOTES
"Yesy Ayala is a 68 y.o. female on day 1 of admission presenting with COPD exacerbation (CMS/[facility]).    Subjective   No new complaint     Scheduled medications  amLODIPine, 10 mg, oral, Daily  aspirin, 81 mg, oral, Daily  budesonide, 0.5 mg, nebulization, BID  [Held by provider] enoxaparin, 30 mg, subcutaneous, q24h  guaiFENesin, 600 mg, oral, BID  insulin lispro, 0-15 Units, subcutaneous, Before meals & nightly  insulin NPH (Isophane), 12 Units, subcutaneous, q12h  ipratropium-albuteroL, 3 mL, nebulization, 4x daily  melatonin, 3 mg, oral, Daily  methylPREDNISolone sodium succinate (PF), 40 mg, intravenous, q8h  metoprolol tartrate, 100 mg, oral, Nightly  pantoprazole, 40 mg, oral, Daily before breakfast   Or  pantoprazole, 40 mg, intravenous, Daily before breakfast  rosuvastatin, 10 mg, oral, Nightly      Objective   Physical Exam  Constitutional:       General: She is not in acute distress.  Neurological:      Mental Status: She is alert.         Last Recorded Vitals  Blood pressure 126/73, pulse 57, temperature 36.7 °C (98.1 °F), temperature source Temporal, resp. rate 18, height 1.499 m (4' 11\"), weight 57.6 kg (127 lb), SpO2 92 %.  Intake/Output last 3 Shifts:  I/O last 3 completed shifts:  In: 50 (0.9 mL/kg) [I.V.:50 (0.9 mL/kg)]  Out: - (0 mL/kg)   Weight: 57.6 kg     Relevant Results  Results from last 7 days   Lab Units 01/17/24  0405 01/16/24  1957 01/16/24  1159 01/16/24  0551 01/16/24  0440 01/15/24  1722   POCT GLUCOSE mg/dL  --  513* 143* 417*  --   --    GLUCOSE mg/dL 176*  --   --   --  663* 318*          Assessment/Plan   Principal Problem:    COPD exacerbation (CMS/[facility])  Active Problems:    COPD (chronic obstructive pulmonary disease) (CMS/[facility])    Type 2 diabetes mellitus, without long-term current use of insulin (CMS/[facility])    Hyperlipidemia    Hypertension    Peripheral vascular disease (CMS/[facility])    RSV (acute bronchiolitis due to respiratory syncytial virus)    Stage 3a chronic kidney disease " (CMS/[facility])      IMPRESSION  TYPE 2 DIABETES MELLITUS WITH HYPERGLYCEMIA  Patient presented with hyperglycemia, further exacerbated by steroid  A1c 9.3% as of 2 months ago indicates chronic hyperglycemia       RECOMMENDATIONS  NPH 12 units subcutaneous q12 hours  Continue lispro scale  Advised she will require insulin at discharge, to be determined  Appreciate pharmacy consult      Juan Manuel العلي MD

## 2024-01-17 NOTE — NURSING NOTE
Patient transferred from observation unit at 1820, no acute distress notd patient admitted for SOB d/t +RSV , contact precautions initiated.

## 2024-01-17 NOTE — PROGRESS NOTES
Patient still receiving IV solumedrol.  Patient will wean to PO prednisone for discharge.  Endocrinology met with patient and patient will go home on insulin.  Patient referred for further diabetic education.  Plan is to monitor oxygenation and respiratory status.  Patient referred to outpatient COPD clinic.  Plan remains for patient to return home upon discharge.  Will continue to follow for discharge planning needs.

## 2024-01-18 LAB
ANION GAP SERPL CALC-SCNC: 14 MMOL/L (ref 10–20)
BUN SERPL-MCNC: 36 MG/DL (ref 6–23)
CALCIUM SERPL-MCNC: 9 MG/DL (ref 8.6–10.3)
CHLORIDE SERPL-SCNC: 105 MMOL/L (ref 98–107)
CO2 SERPL-SCNC: 25 MMOL/L (ref 21–32)
CREAT SERPL-MCNC: 1.98 MG/DL (ref 0.5–1.05)
EGFRCR SERPLBLD CKD-EPI 2021: 27 ML/MIN/1.73M*2
ERYTHROCYTE [DISTWIDTH] IN BLOOD BY AUTOMATED COUNT: 18.7 % (ref 11.5–14.5)
GLUCOSE BLD MANUAL STRIP-MCNC: 162 MG/DL (ref 74–99)
GLUCOSE BLD MANUAL STRIP-MCNC: 384 MG/DL (ref 74–99)
GLUCOSE BLD MANUAL STRIP-MCNC: 396 MG/DL (ref 74–99)
GLUCOSE BLD MANUAL STRIP-MCNC: 440 MG/DL (ref 74–99)
GLUCOSE SERPL-MCNC: 82 MG/DL (ref 74–99)
HCT VFR BLD AUTO: 39 % (ref 36–46)
HGB BLD-MCNC: 12.4 G/DL (ref 12–16)
MCH RBC QN AUTO: 20.7 PG (ref 26–34)
MCHC RBC AUTO-ENTMCNC: 31.8 G/DL (ref 32–36)
MCV RBC AUTO: 65 FL (ref 80–100)
NRBC BLD-RTO: 0 /100 WBCS (ref 0–0)
PLATELET # BLD AUTO: 72 X10*3/UL (ref 150–450)
POTASSIUM SERPL-SCNC: 3.5 MMOL/L (ref 3.5–5.3)
RBC # BLD AUTO: 6 X10*6/UL (ref 4–5.2)
SODIUM SERPL-SCNC: 140 MMOL/L (ref 136–145)
WBC # BLD AUTO: 19.9 X10*3/UL (ref 4.4–11.3)

## 2024-01-18 PROCEDURE — 94640 AIRWAY INHALATION TREATMENT: CPT

## 2024-01-18 PROCEDURE — 2500000002 HC RX 250 W HCPCS SELF ADMINISTERED DRUGS (ALT 637 FOR MEDICARE OP, ALT 636 FOR OP/ED): Performed by: INTERNAL MEDICINE

## 2024-01-18 PROCEDURE — 2500000004 HC RX 250 GENERAL PHARMACY W/ HCPCS (ALT 636 FOR OP/ED): Performed by: INTERNAL MEDICINE

## 2024-01-18 PROCEDURE — 36415 COLL VENOUS BLD VENIPUNCTURE: CPT | Performed by: INTERNAL MEDICINE

## 2024-01-18 PROCEDURE — 2500000001 HC RX 250 WO HCPCS SELF ADMINISTERED DRUGS (ALT 637 FOR MEDICARE OP): Performed by: PHYSICIAN ASSISTANT

## 2024-01-18 PROCEDURE — 99232 SBSQ HOSP IP/OBS MODERATE 35: CPT | Performed by: INTERNAL MEDICINE

## 2024-01-18 PROCEDURE — 2500000004 HC RX 250 GENERAL PHARMACY W/ HCPCS (ALT 636 FOR OP/ED): Performed by: PHYSICIAN ASSISTANT

## 2024-01-18 PROCEDURE — 85027 COMPLETE CBC AUTOMATED: CPT | Performed by: INTERNAL MEDICINE

## 2024-01-18 PROCEDURE — 1200000002 HC GENERAL ROOM WITH TELEMETRY DAILY

## 2024-01-18 PROCEDURE — 99231 SBSQ HOSP IP/OBS SF/LOW 25: CPT | Performed by: INTERNAL MEDICINE

## 2024-01-18 PROCEDURE — 82947 ASSAY GLUCOSE BLOOD QUANT: CPT

## 2024-01-18 PROCEDURE — 2500000001 HC RX 250 WO HCPCS SELF ADMINISTERED DRUGS (ALT 637 FOR MEDICARE OP): Performed by: NURSE PRACTITIONER

## 2024-01-18 PROCEDURE — 80048 BASIC METABOLIC PNL TOTAL CA: CPT | Performed by: INTERNAL MEDICINE

## 2024-01-18 PROCEDURE — 2500000002 HC RX 250 W HCPCS SELF ADMINISTERED DRUGS (ALT 637 FOR MEDICARE OP, ALT 636 FOR OP/ED): Performed by: PHYSICIAN ASSISTANT

## 2024-01-18 RX ORDER — DEXTROSE 50 % IN WATER (D50W) INTRAVENOUS SYRINGE
25
Status: DISCONTINUED | OUTPATIENT
Start: 2024-01-18 | End: 2024-01-19

## 2024-01-18 RX ORDER — INSULIN LISPRO 100 [IU]/ML
0-10 INJECTION, SOLUTION INTRAVENOUS; SUBCUTANEOUS
Status: DISCONTINUED | OUTPATIENT
Start: 2024-01-18 | End: 2024-01-25 | Stop reason: HOSPADM

## 2024-01-18 RX ORDER — INSULIN LISPRO 100 [IU]/ML
4 INJECTION, SOLUTION INTRAVENOUS; SUBCUTANEOUS
Status: DISCONTINUED | OUTPATIENT
Start: 2024-01-18 | End: 2024-01-19

## 2024-01-18 RX ADMIN — METHYLPREDNISOLONE SODIUM SUCCINATE 40 MG: 40 INJECTION, POWDER, LYOPHILIZED, FOR SOLUTION INTRAMUSCULAR; INTRAVENOUS at 13:46

## 2024-01-18 RX ADMIN — INSULIN LISPRO 10 UNITS: 100 INJECTION, SOLUTION INTRAVENOUS; SUBCUTANEOUS at 12:26

## 2024-01-18 RX ADMIN — GUAIFENESIN 600 MG: 600 TABLET, EXTENDED RELEASE ORAL at 09:36

## 2024-01-18 RX ADMIN — METHYLPREDNISOLONE SODIUM SUCCINATE 40 MG: 40 INJECTION, POWDER, LYOPHILIZED, FOR SOLUTION INTRAMUSCULAR; INTRAVENOUS at 22:45

## 2024-01-18 RX ADMIN — GUAIFENESIN 600 MG: 600 TABLET, EXTENDED RELEASE ORAL at 21:32

## 2024-01-18 RX ADMIN — INSULIN HUMAN 12 UNITS: 100 INJECTION, SUSPENSION SUBCUTANEOUS at 09:39

## 2024-01-18 RX ADMIN — AMLODIPINE BESYLATE 10 MG: 10 TABLET ORAL at 09:36

## 2024-01-18 RX ADMIN — INSULIN LISPRO 4 UNITS: 100 INJECTION, SOLUTION INTRAVENOUS; SUBCUTANEOUS at 12:30

## 2024-01-18 RX ADMIN — ROSUVASTATIN 10 MG: 10 TABLET, FILM COATED ORAL at 21:32

## 2024-01-18 RX ADMIN — IPRATROPIUM BROMIDE AND ALBUTEROL SULFATE 3 ML: 2.5; .5 SOLUTION RESPIRATORY (INHALATION) at 22:02

## 2024-01-18 RX ADMIN — ASPIRIN 81 MG: 81 TABLET, COATED ORAL at 09:36

## 2024-01-18 RX ADMIN — INSULIN LISPRO 10 UNITS: 100 INJECTION, SOLUTION INTRAVENOUS; SUBCUTANEOUS at 16:46

## 2024-01-18 RX ADMIN — INSULIN LISPRO 2 UNITS: 100 INJECTION, SOLUTION INTRAVENOUS; SUBCUTANEOUS at 09:37

## 2024-01-18 RX ADMIN — Medication 3 MG: at 21:38

## 2024-01-18 RX ADMIN — METOPROLOL TARTRATE 100 MG: 50 TABLET, FILM COATED ORAL at 21:32

## 2024-01-18 RX ADMIN — INSULIN LISPRO 4 UNITS: 100 INJECTION, SOLUTION INTRAVENOUS; SUBCUTANEOUS at 09:51

## 2024-01-18 RX ADMIN — BUDESONIDE 0.5 MG: 0.5 INHALANT ORAL at 07:28

## 2024-01-18 RX ADMIN — METHYLPREDNISOLONE SODIUM SUCCINATE 40 MG: 40 INJECTION, POWDER, FOR SOLUTION INTRAMUSCULAR; INTRAVENOUS at 06:39

## 2024-01-18 RX ADMIN — IPRATROPIUM BROMIDE AND ALBUTEROL SULFATE 3 ML: 2.5; .5 SOLUTION RESPIRATORY (INHALATION) at 07:28

## 2024-01-18 RX ADMIN — PANTOPRAZOLE SODIUM 40 MG: 40 TABLET, DELAYED RELEASE ORAL at 06:40

## 2024-01-18 RX ADMIN — IPRATROPIUM BROMIDE AND ALBUTEROL SULFATE 3 ML: 2.5; .5 SOLUTION RESPIRATORY (INHALATION) at 13:34

## 2024-01-18 RX ADMIN — BUDESONIDE 0.5 MG: 0.5 INHALANT ORAL at 22:02

## 2024-01-18 RX ADMIN — INSULIN LISPRO 4 UNITS: 100 INJECTION, SOLUTION INTRAVENOUS; SUBCUTANEOUS at 16:51

## 2024-01-18 RX ADMIN — INSULIN HUMAN 12 UNITS: 100 INJECTION, SUSPENSION SUBCUTANEOUS at 21:32

## 2024-01-18 ASSESSMENT — COGNITIVE AND FUNCTIONAL STATUS - GENERAL
WALKING IN HOSPITAL ROOM: A LITTLE
CLIMB 3 TO 5 STEPS WITH RAILING: A LITTLE
MOBILITY SCORE: 22
DAILY ACTIVITIY SCORE: 24
DAILY ACTIVITIY SCORE: 24
CLIMB 3 TO 5 STEPS WITH RAILING: A LITTLE
MOBILITY SCORE: 22
WALKING IN HOSPITAL ROOM: A LITTLE

## 2024-01-18 ASSESSMENT — PAIN SCALES - GENERAL: PAINLEVEL_OUTOF10: 0 - NO PAIN

## 2024-01-18 NOTE — CARE PLAN
Problem: Pain - Adult  Goal: Verbalizes/displays adequate comfort level or baseline comfort level  Outcome: Progressing     Problem: Safety - Adult  Goal: Free from fall injury  Outcome: Progressing     Problem: Discharge Planning  Goal: Discharge to home or other facility with appropriate resources  Outcome: Progressing     Problem: Chronic Conditions and Co-morbidities  Goal: Patient's chronic conditions and co-morbidity symptoms are monitored and maintained or improved  Outcome: Progressing     Problem: Diabetes  Goal: Achieve decreasing blood glucose levels by end of shift  Outcome: Progressing  Goal: Increase stability of blood glucose readings by end of shift  Outcome: Progressing     Problem: Respiratory  Goal: Minimal/no exertional discomfort or dyspnea this shift  Outcome: Progressing   The patient's goals for the shift include      The clinical goals for the shift include Pt will have BG <300 at am VS check.

## 2024-01-18 NOTE — PROGRESS NOTES
"Yesy Ayala is a 68 y.o. female on day 2 of admission presenting with COPD exacerbation (CMS/HCC).    Subjective   Patient still has significant dyspnea on exertion and at rest she states she is fine, blood glucose controlled likely needs discharge on insulin       Objective     Physical Exam  Constitutional:       Appearance: Normal appearance.   HENT:      Head: Normocephalic.      Nose: Nose normal.      Mouth/Throat:      Mouth: Mucous membranes are moist.   Cardiovascular:      Rate and Rhythm: Normal rate and regular rhythm.      Pulses: Normal pulses.      Heart sounds: Normal heart sounds.   Pulmonary:      Effort: Pulmonary effort is normal.      Breath sounds: Normal breath sounds.   Abdominal:      General: Abdomen is flat. Bowel sounds are normal.      Palpations: Abdomen is soft.   Skin:     General: Skin is warm.      Capillary Refill: Capillary refill takes less than 2 seconds.   Neurological:      General: No focal deficit present.      Mental Status: She is alert.         Last Recorded Vitals  Blood pressure 99/55, pulse 81, temperature 36.3 °C (97.4 °F), resp. rate 18, height 1.499 m (4' 11\"), weight 57.6 kg (127 lb), SpO2 92 %.  Intake/Output last 3 Shifts:  I/O last 3 completed shifts:  In: 800 (13.9 mL/kg) [P.O.:800]  Out: - (0 mL/kg)   Weight: 57.6 kg     Relevant Results  Results for orders placed or performed during the hospital encounter of 01/15/24 (from the past 24 hour(s))   POCT GLUCOSE   Result Value Ref Range    POCT Glucose 281 (H) 74 - 99 mg/dL   POCT GLUCOSE   Result Value Ref Range    POCT Glucose 383 (H) 74 - 99 mg/dL   Basic metabolic panel   Result Value Ref Range    Glucose 82 74 - 99 mg/dL    Sodium 140 136 - 145 mmol/L    Potassium 3.5 3.5 - 5.3 mmol/L    Chloride 105 98 - 107 mmol/L    Bicarbonate 25 21 - 32 mmol/L    Anion Gap 14 10 - 20 mmol/L    Urea Nitrogen 36 (H) 6 - 23 mg/dL    Creatinine 1.98 (H) 0.50 - 1.05 mg/dL    eGFR 27 (L) >60 mL/min/1.73m*2    Calcium 9.0 8.6 " - 10.3 mg/dL   CBC   Result Value Ref Range    WBC 19.9 (H) 4.4 - 11.3 x10*3/uL    nRBC 0.0 0.0 - 0.0 /100 WBCs    RBC 6.00 (H) 4.00 - 5.20 x10*6/uL    Hemoglobin 12.4 12.0 - 16.0 g/dL    Hematocrit 39.0 36.0 - 46.0 %    MCV 65 (L) 80 - 100 fL    MCH 20.7 (L) 26.0 - 34.0 pg    MCHC 31.8 (L) 32.0 - 36.0 g/dL    RDW 18.7 (H) 11.5 - 14.5 %    Platelets 72 (L) 150 - 450 x10*3/uL   POCT GLUCOSE   Result Value Ref Range    POCT Glucose 162 (H) 74 - 99 mg/dL   POCT GLUCOSE   Result Value Ref Range    POCT Glucose 440 (H) 74 - 99 mg/dL       Imaging Results  Cxr: negative    Medications:  amLODIPine, 10 mg, oral, Daily  aspirin, 81 mg, oral, Daily  budesonide, 0.5 mg, nebulization, BID  enoxaparin, 30 mg, subcutaneous, q24h  guaiFENesin, 600 mg, oral, BID  insulin lispro, 0-10 Units, subcutaneous, TID with meals  insulin lispro, 4 Units, subcutaneous, TID with meals  insulin NPH (Isophane), 12 Units, subcutaneous, q12h  ipratropium-albuteroL, 3 mL, nebulization, TID  melatonin, 3 mg, oral, Daily  methylPREDNISolone sodium succinate (PF), 40 mg, intravenous, q12h  metoprolol tartrate, 100 mg, oral, Nightly  pantoprazole, 40 mg, oral, Daily before breakfast   Or  pantoprazole, 40 mg, intravenous, Daily before breakfast  rosuvastatin, 10 mg, oral, Nightly       PRN medications: acetaminophen, albuterol, benzonatate, dextrose 10 % in water (D10W), dextrose, docusate sodium, glucagon, hydrALAZINE, oxygen     Assessment/Plan   COPD exacerbation secondary to RSV  -Continue Pulmicort, DuoNebs,   Continue Solu-Medrol to 40 mg every 12 hours    2.  Diabetes  -Significant hyperglycemia likely she will need to be discharged on insulin NPH, currently at 12 units, endocrinology on board    3.  Hyperlipidemia  -Continue rosuvastatin oral iron    DVT Prophylaxis:  Lovenox subq        Renita Weaver MD  [facility]

## 2024-01-18 NOTE — PROGRESS NOTES
"Yesy Ayala is a 68 y.o. female on day 2 of admission presenting with COPD exacerbation (CMS/HCC).    Subjective   Persistent shortness of breath  Methylprednisolone 40 mg IV q12h     Scheduled medications  amLODIPine, 10 mg, oral, Daily  aspirin, 81 mg, oral, Daily  budesonide, 0.5 mg, nebulization, BID  enoxaparin, 30 mg, subcutaneous, q24h  guaiFENesin, 600 mg, oral, BID  insulin lispro, 0-15 Units, subcutaneous, Before meals & nightly  insulin NPH (Isophane), 12 Units, subcutaneous, q12h  ipratropium-albuteroL, 3 mL, nebulization, TID  melatonin, 3 mg, oral, Daily  methylPREDNISolone sodium succinate (PF), 40 mg, intravenous, q12h  metoprolol tartrate, 100 mg, oral, Nightly  pantoprazole, 40 mg, oral, Daily before breakfast   Or  pantoprazole, 40 mg, intravenous, Daily before breakfast  rosuvastatin, 10 mg, oral, Nightly        Objective   Physical Exam  Constitutional:       General: She is not in acute distress.  Neurological:      Mental Status: She is alert.         Last Recorded Vitals  Blood pressure 119/63, pulse 59, temperature 36.4 °C (97.5 °F), temperature source Oral, resp. rate 18, height 1.499 m (4' 11\"), weight 57.6 kg (127 lb), SpO2 97 %.  Intake/Output last 3 Shifts:  I/O last 3 completed shifts:  In: 800 (13.9 mL/kg) [P.O.:800]  Out: - (0 mL/kg)   Weight: 57.6 kg     Relevant Results  Results from last 7 days   Lab Units 01/18/24  0506 01/17/24  2108 01/17/24  1458 01/17/24  1121 01/17/24  0825 01/17/24  0405 01/16/24  1957 01/16/24  0551 01/16/24  0440 01/15/24  1722   POCT GLUCOSE mg/dL  --  383* 281* 419* 263*  --  513*   < >  --   --    GLUCOSE mg/dL 82  --   --   --   --  176*  --   --  663* 318*    < > = values in this interval not displayed.         Assessment/Plan   Principal Problem:    COPD exacerbation (CMS/HCC)  Active Problems:    COPD (chronic obstructive pulmonary disease) (CMS/[facility])    Type 2 diabetes mellitus, without long-term current use of insulin (CMS/[facility])    Hyperlipidemia   "  Hypertension    Peripheral vascular disease (CMS/[facility])    RSV (acute bronchiolitis due to respiratory syncytial virus)    Stage 3a chronic kidney disease (CMS/[facility])    IMPRESSION  TYPE 2 DIABETES MELLITUS WITH HYPERGLYCEMIA  Patient presented with hyperglycemia, further exacerbated by steroid  A1c 9.3% as of 2 months ago indicates chronic hyperglycemia        RECOMMENDATIONS  NPH 12 units subcutaneous q12 hours  Insulin lispro 4 units QAC plus scale  Advised she will require insulin at discharge, to be determined  Appreciate pharmacy consult      Juan Manuel العلي MD

## 2024-01-19 LAB
ANION GAP SERPL CALC-SCNC: 12 MMOL/L (ref 10–20)
BUN SERPL-MCNC: 46 MG/DL (ref 6–23)
CALCIUM SERPL-MCNC: 8.8 MG/DL (ref 8.6–10.3)
CHLORIDE SERPL-SCNC: 100 MMOL/L (ref 98–107)
CO2 SERPL-SCNC: 26 MMOL/L (ref 21–32)
CREAT SERPL-MCNC: 2.27 MG/DL (ref 0.5–1.05)
EGFRCR SERPLBLD CKD-EPI 2021: 23 ML/MIN/1.73M*2
ERYTHROCYTE [DISTWIDTH] IN BLOOD BY AUTOMATED COUNT: 19.1 % (ref 11.5–14.5)
GLUCOSE BLD MANUAL STRIP-MCNC: 327 MG/DL (ref 74–99)
GLUCOSE BLD MANUAL STRIP-MCNC: 363 MG/DL (ref 74–99)
GLUCOSE BLD MANUAL STRIP-MCNC: 421 MG/DL (ref 74–99)
GLUCOSE BLD MANUAL STRIP-MCNC: 448 MG/DL (ref 74–99)
GLUCOSE BLD MANUAL STRIP-MCNC: 505 MG/DL (ref 74–99)
GLUCOSE SERPL-MCNC: 444 MG/DL (ref 74–99)
HCT VFR BLD AUTO: 41.2 % (ref 36–46)
HGB BLD-MCNC: 13.3 G/DL (ref 12–16)
MCH RBC QN AUTO: 21 PG (ref 26–34)
MCHC RBC AUTO-ENTMCNC: 32.3 G/DL (ref 32–36)
MCV RBC AUTO: 65 FL (ref 80–100)
NRBC BLD-RTO: 0 /100 WBCS (ref 0–0)
PLATELET # BLD AUTO: 73 X10*3/UL (ref 150–450)
POTASSIUM SERPL-SCNC: 4.1 MMOL/L (ref 3.5–5.3)
RBC # BLD AUTO: 6.34 X10*6/UL (ref 4–5.2)
SODIUM SERPL-SCNC: 134 MMOL/L (ref 136–145)
WBC # BLD AUTO: 15.3 X10*3/UL (ref 4.4–11.3)

## 2024-01-19 PROCEDURE — 2500000004 HC RX 250 GENERAL PHARMACY W/ HCPCS (ALT 636 FOR OP/ED): Performed by: PHYSICIAN ASSISTANT

## 2024-01-19 PROCEDURE — 2500000002 HC RX 250 W HCPCS SELF ADMINISTERED DRUGS (ALT 637 FOR MEDICARE OP, ALT 636 FOR OP/ED): Performed by: INTERNAL MEDICINE

## 2024-01-19 PROCEDURE — 2500000001 HC RX 250 WO HCPCS SELF ADMINISTERED DRUGS (ALT 637 FOR MEDICARE OP): Performed by: NURSE PRACTITIONER

## 2024-01-19 PROCEDURE — 1200000002 HC GENERAL ROOM WITH TELEMETRY DAILY

## 2024-01-19 PROCEDURE — 2500000002 HC RX 250 W HCPCS SELF ADMINISTERED DRUGS (ALT 637 FOR MEDICARE OP, ALT 636 FOR OP/ED): Performed by: PHARMACIST

## 2024-01-19 PROCEDURE — 99231 SBSQ HOSP IP/OBS SF/LOW 25: CPT | Performed by: INTERNAL MEDICINE

## 2024-01-19 PROCEDURE — 80048 BASIC METABOLIC PNL TOTAL CA: CPT | Performed by: INTERNAL MEDICINE

## 2024-01-19 PROCEDURE — 99233 SBSQ HOSP IP/OBS HIGH 50: CPT | Performed by: INTERNAL MEDICINE

## 2024-01-19 PROCEDURE — 2500000004 HC RX 250 GENERAL PHARMACY W/ HCPCS (ALT 636 FOR OP/ED): Performed by: INTERNAL MEDICINE

## 2024-01-19 PROCEDURE — 82947 ASSAY GLUCOSE BLOOD QUANT: CPT

## 2024-01-19 PROCEDURE — 85027 COMPLETE CBC AUTOMATED: CPT | Performed by: INTERNAL MEDICINE

## 2024-01-19 PROCEDURE — 36415 COLL VENOUS BLD VENIPUNCTURE: CPT | Performed by: INTERNAL MEDICINE

## 2024-01-19 PROCEDURE — 2500000001 HC RX 250 WO HCPCS SELF ADMINISTERED DRUGS (ALT 637 FOR MEDICARE OP): Performed by: PHYSICIAN ASSISTANT

## 2024-01-19 PROCEDURE — 2500000002 HC RX 250 W HCPCS SELF ADMINISTERED DRUGS (ALT 637 FOR MEDICARE OP, ALT 636 FOR OP/ED): Performed by: PHYSICIAN ASSISTANT

## 2024-01-19 PROCEDURE — 94640 AIRWAY INHALATION TREATMENT: CPT

## 2024-01-19 RX ORDER — INSULIN LISPRO 100 [IU]/ML
20 INJECTION, SOLUTION INTRAVENOUS; SUBCUTANEOUS ONCE
Status: DISCONTINUED | OUTPATIENT
Start: 2024-01-19 | End: 2024-01-19

## 2024-01-19 RX ORDER — DEXTROSE 50 % IN WATER (D50W) INTRAVENOUS SYRINGE
25
Status: DISCONTINUED | OUTPATIENT
Start: 2024-01-19 | End: 2024-01-25 | Stop reason: HOSPADM

## 2024-01-19 RX ORDER — INSULIN LISPRO 100 [IU]/ML
4 INJECTION, SOLUTION INTRAVENOUS; SUBCUTANEOUS ONCE
Status: COMPLETED | OUTPATIENT
Start: 2024-01-19 | End: 2024-01-19

## 2024-01-19 RX ORDER — SODIUM CHLORIDE 9 MG/ML
100 INJECTION, SOLUTION INTRAVENOUS CONTINUOUS
Status: DISCONTINUED | OUTPATIENT
Start: 2024-01-19 | End: 2024-01-22

## 2024-01-19 RX ORDER — INSULIN LISPRO 100 [IU]/ML
6 INJECTION, SOLUTION INTRAVENOUS; SUBCUTANEOUS
Status: DISCONTINUED | OUTPATIENT
Start: 2024-01-19 | End: 2024-01-22

## 2024-01-19 RX ADMIN — INSULIN LISPRO 10 UNITS: 100 INJECTION, SOLUTION INTRAVENOUS; SUBCUTANEOUS at 11:59

## 2024-01-19 RX ADMIN — METHYLPREDNISOLONE SODIUM SUCCINATE 40 MG: 40 INJECTION, POWDER, LYOPHILIZED, FOR SOLUTION INTRAMUSCULAR; INTRAVENOUS at 14:05

## 2024-01-19 RX ADMIN — INSULIN HUMAN 22 UNITS: 100 INJECTION, SUSPENSION SUBCUTANEOUS at 08:06

## 2024-01-19 RX ADMIN — ASPIRIN 81 MG: 81 TABLET, COATED ORAL at 08:06

## 2024-01-19 RX ADMIN — GUAIFENESIN 600 MG: 600 TABLET, EXTENDED RELEASE ORAL at 20:49

## 2024-01-19 RX ADMIN — SODIUM CHLORIDE 100 ML/HR: 9 INJECTION, SOLUTION INTRAVENOUS at 09:55

## 2024-01-19 RX ADMIN — METHYLPREDNISOLONE SODIUM SUCCINATE 40 MG: 40 INJECTION, POWDER, LYOPHILIZED, FOR SOLUTION INTRAMUSCULAR; INTRAVENOUS at 06:12

## 2024-01-19 RX ADMIN — BUDESONIDE 0.5 MG: 0.5 INHALANT ORAL at 07:35

## 2024-01-19 RX ADMIN — ROSUVASTATIN 10 MG: 10 TABLET, FILM COATED ORAL at 20:49

## 2024-01-19 RX ADMIN — INSULIN LISPRO 6 UNITS: 100 INJECTION, SOLUTION INTRAVENOUS; SUBCUTANEOUS at 11:59

## 2024-01-19 RX ADMIN — INSULIN LISPRO 6 UNITS: 100 INJECTION, SOLUTION INTRAVENOUS; SUBCUTANEOUS at 17:27

## 2024-01-19 RX ADMIN — INSULIN LISPRO 6 UNITS: 100 INJECTION, SOLUTION INTRAVENOUS; SUBCUTANEOUS at 08:16

## 2024-01-19 RX ADMIN — Medication 3 MG: at 20:50

## 2024-01-19 RX ADMIN — INSULIN LISPRO 4 UNITS: 100 INJECTION, SOLUTION INTRAVENOUS; SUBCUTANEOUS at 12:23

## 2024-01-19 RX ADMIN — METOPROLOL TARTRATE 100 MG: 50 TABLET, FILM COATED ORAL at 20:49

## 2024-01-19 RX ADMIN — IPRATROPIUM BROMIDE AND ALBUTEROL SULFATE 3 ML: 2.5; .5 SOLUTION RESPIRATORY (INHALATION) at 07:35

## 2024-01-19 RX ADMIN — INSULIN HUMAN 22 UNITS: 100 INJECTION, SUSPENSION SUBCUTANEOUS at 20:49

## 2024-01-19 RX ADMIN — IPRATROPIUM BROMIDE AND ALBUTEROL SULFATE 3 ML: 2.5; .5 SOLUTION RESPIRATORY (INHALATION) at 19:15

## 2024-01-19 RX ADMIN — AMLODIPINE BESYLATE 10 MG: 10 TABLET ORAL at 08:06

## 2024-01-19 RX ADMIN — IPRATROPIUM BROMIDE AND ALBUTEROL SULFATE 3 ML: 2.5; .5 SOLUTION RESPIRATORY (INHALATION) at 14:46

## 2024-01-19 RX ADMIN — GUAIFENESIN 600 MG: 600 TABLET, EXTENDED RELEASE ORAL at 08:06

## 2024-01-19 RX ADMIN — INSULIN LISPRO 10 UNITS: 100 INJECTION, SOLUTION INTRAVENOUS; SUBCUTANEOUS at 17:27

## 2024-01-19 RX ADMIN — INSULIN LISPRO 10 UNITS: 100 INJECTION, SOLUTION INTRAVENOUS; SUBCUTANEOUS at 08:06

## 2024-01-19 RX ADMIN — BUDESONIDE 0.5 MG: 0.5 INHALANT ORAL at 19:15

## 2024-01-19 ASSESSMENT — PAIN SCALES - GENERAL
PAINLEVEL_OUTOF10: 0 - NO PAIN

## 2024-01-19 ASSESSMENT — COGNITIVE AND FUNCTIONAL STATUS - GENERAL
MOBILITY SCORE: 23
DAILY ACTIVITIY SCORE: 24
CLIMB 3 TO 5 STEPS WITH RAILING: A LITTLE

## 2024-01-19 ASSESSMENT — PAIN - FUNCTIONAL ASSESSMENT
PAIN_FUNCTIONAL_ASSESSMENT: 0-10

## 2024-01-19 NOTE — NURSING NOTE
"Patient now stating \"I will have family bring food in from outside because nobody will feed me\". Patient currently has carrots and mac and cheese for lunch. Patient also refusing diet soda to accompany lunch.   "

## 2024-01-19 NOTE — CARE PLAN
Problem: Pain - Adult  Goal: Verbalizes/displays adequate comfort level or baseline comfort level  Outcome: Progressing     Problem: Safety - Adult  Goal: Free from fall injury  Outcome: Progressing     Problem: Discharge Planning  Goal: Discharge to home or other facility with appropriate resources  Outcome: Progressing     Problem: Chronic Conditions and Co-morbidities  Goal: Patient's chronic conditions and co-morbidity symptoms are monitored and maintained or improved  Outcome: Progressing     Problem: Diabetes  Goal: Achieve decreasing blood glucose levels by end of shift  Outcome: Progressing  Goal: Increase stability of blood glucose readings by end of shift  Outcome: Progressing  Goal: Decrease in ketones present in urine by end of shift  Outcome: Progressing  Goal: Maintain electrolyte levels within acceptable range throughout shift  Outcome: Progressing  Goal: Maintain glucose levels >70mg/dl to <250mg/dl throughout shift  Outcome: Progressing  Goal: No changes in neurological exam by end of shift  Outcome: Progressing  Goal: Learn about and adhere to nutrition recommendations by end of shift  Outcome: Progressing  Goal: Vital signs within normal range for age by end of shift  Outcome: Progressing  Goal: Increase self care and/or family involovement by end of shift  Outcome: Progressing  Goal: Receive DSME education by end of shift  Outcome: Progressing     Problem: Respiratory  Goal: Minimal/no exertional discomfort or dyspnea this shift  Outcome: Progressing  Goal: No signs of respiratory distress (eg. Use of accessory muscles. Peds grunting)  Outcome: Progressing   The patient's goals for the shift include  breathe better    The clinical goals for the shift include improve blood sugar control, improve breathing

## 2024-01-19 NOTE — NURSING NOTE
"Spoke with patient regarding care. Patient verbalizes she feels nothing is being done about her breathing and that everyone only cares about her BG. I explained the purpose of the steroid and the side effects it can have with her sugar which is why she was receiving so much insulin. She verbalized understanding and then proceeded to open a pepsi. I advised that she not drink it because it will only lead to more insulin (which she still does not understand why she is receiving), also explaining the risks that can come with an elevated/uncontrolled sugar and patient states \"she did not care about it so she was going to drink it anyway because nothing we're doing makes any sense\". Provider and endocrine notified of patients continued lack of understanding of the plan of care.    "

## 2024-01-19 NOTE — PROGRESS NOTES
01/19/2024 0728 Patient not medically ready for discharge. Dx: RSV on O2 @3L n/c baseline no home oxygen. Chart reviewed per [facility]. MD notes reflect patient with significant dyspnes at rest. Anticipated to require insuline at discharge. CDE order for consult was placed.   Alondra LANE RN [facility] CCM

## 2024-01-19 NOTE — PROGRESS NOTES
"Yesy Ayala is a 68 y.o. female on day 3 of admission presenting with COPD exacerbation (CMS/HCC).    Subjective   Patient now requiring 3 L of nasal cannula oxygen she has not improved much I started her on IV fluids as there was acute kidney injury on baseline chronic kidney disease she bumped up to 2.27.  Patient herself does not feel better will consult pulmonary as well       Objective     Physical Exam  Constitutional:       Appearance: Normal appearance.   HENT:      Head: Normocephalic.      Nose: Nose normal.      Mouth/Throat:      Mouth: Mucous membranes are moist.   Cardiovascular:      Rate and Rhythm: Normal rate and regular rhythm.      Pulses: Normal pulses.      Heart sounds: Normal heart sounds.   Pulmonary:      Comments: Wheezing bilaterally  Abdominal:      General: Abdomen is flat. Bowel sounds are normal.      Palpations: Abdomen is soft.   Skin:     General: Skin is warm.      Capillary Refill: Capillary refill takes less than 2 seconds.   Neurological:      General: No focal deficit present.      Mental Status: She is alert.         Last Recorded Vitals  Blood pressure 113/56, pulse 55, temperature 36.7 °C (98.1 °F), temperature source Oral, resp. rate 20, height 1.499 m (4' 11\"), weight 57.6 kg (127 lb), SpO2 98 %.  Intake/Output last 3 Shifts:  I/O last 3 completed shifts:  In: 360 (6.2 mL/kg) [P.O.:360]  Out: - (0 mL/kg)   Weight: 57.6 kg     Relevant Results  Results for orders placed or performed during the hospital encounter of 01/15/24 (from the past 24 hour(s))   POCT GLUCOSE   Result Value Ref Range    POCT Glucose 396 (H) 74 - 99 mg/dL   POCT GLUCOSE   Result Value Ref Range    POCT Glucose 384 (H) 74 - 99 mg/dL   Basic metabolic panel   Result Value Ref Range    Glucose 444 (H) 74 - 99 mg/dL    Sodium 134 (L) 136 - 145 mmol/L    Potassium 4.1 3.5 - 5.3 mmol/L    Chloride 100 98 - 107 mmol/L    Bicarbonate 26 21 - 32 mmol/L    Anion Gap 12 10 - 20 mmol/L    Urea Nitrogen 46 (H) 6 - " 23 mg/dL    Creatinine 2.27 (H) 0.50 - 1.05 mg/dL    eGFR 23 (L) >60 mL/min/1.73m*2    Calcium 8.8 8.6 - 10.3 mg/dL   CBC   Result Value Ref Range    WBC 15.3 (H) 4.4 - 11.3 x10*3/uL    nRBC 0.0 0.0 - 0.0 /100 WBCs    RBC 6.34 (H) 4.00 - 5.20 x10*6/uL    Hemoglobin 13.3 12.0 - 16.0 g/dL    Hematocrit 41.2 36.0 - 46.0 %    MCV 65 (L) 80 - 100 fL    MCH 21.0 (L) 26.0 - 34.0 pg    MCHC 32.3 32.0 - 36.0 g/dL    RDW 19.1 (H) 11.5 - 14.5 %    Platelets 73 (L) 150 - 450 x10*3/uL   POCT GLUCOSE   Result Value Ref Range    POCT Glucose 448 (H) 74 - 99 mg/dL   POCT GLUCOSE   Result Value Ref Range    POCT Glucose 505 (H) 74 - 99 mg/dL       Imaging Results  Cxr: negative    Medications:  amLODIPine, 10 mg, oral, Daily  aspirin, 81 mg, oral, Daily  budesonide, 0.5 mg, nebulization, BID  enoxaparin, 30 mg, subcutaneous, q24h  guaiFENesin, 600 mg, oral, BID  insulin lispro, 0-10 Units, subcutaneous, TID with meals  insulin lispro, 6 Units, subcutaneous, TID with meals  insulin NPH (Isophane), 22 Units, subcutaneous, q12h  ipratropium-albuteroL, 3 mL, nebulization, TID  melatonin, 3 mg, oral, Daily  methylPREDNISolone sodium succinate (PF), 40 mg, intravenous, q8h  metoprolol tartrate, 100 mg, oral, Nightly  pantoprazole, 40 mg, oral, Daily before breakfast   Or  pantoprazole, 40 mg, intravenous, Daily before breakfast  rosuvastatin, 10 mg, oral, Nightly       PRN medications: acetaminophen, albuterol, benzonatate, dextrose 10 % in water (D10W), dextrose, docusate sodium, glucagon, hydrALAZINE, oxygen     Assessment/Plan   COPD exacerbation secondary to RSV  -Continue Pulmicort, Magaly,   -Currently on Solu-Medrol 40 mg IV every 8 hours, on 3 L of nasal cannula oxygen, will also get pulmonary input patient has not had much improvement    2.  Diabetes  -Significant hyperglycemia likely she will need to be discharged on insulin NPH, currently at 12 units, endocrinology on board    3.  Hyperlipidemia  -Continue rosuvastatin oral  iron    4.  Acute kidney injury on CKD 3  -Baseline creatinine is around 1.3 patient bumped up to 2.27, will hydrate with IV fluids and reassess    DVT Prophylaxis:  Lovenox amarilys Weaver MD  [facility]

## 2024-01-19 NOTE — PROGRESS NOTES
"Nutrition Progress Note    RDN consulted for diabetic diet education.       Pt admitted with SOB, found to be positive for RSV.   PMH includes DM, COPD, HTN, PVD.     Pt woke from nap to stated name. Pt verbalized her frustration with being told \"no\" to certain food items she has been asking for, and is also confused because she had pancakes with regular syrup for breakfast, but then was told she could not have a tuna sandwich or regular gingerale, or a cranberry juice. Pt expressed confusion over what she can and can not have to eat.     Pt did state she understands now that the steroids she needs for her breathing increases her blood sugars. However, when asked how long she's been diabetic she said she really didn't know she was a diabetic...she was only pre-diabetic five years ago.     Pt receptive to diet education, however only focused on a few things at this time d/t pt being frustrated earlier and is now open to talking.   Encouraged pt to:   - eat consistent meals during the day, includes breakfast lunch and dinner   - consume well balanced meals (discussed incorporating protein, carbohydrates such as potatoes which pt likes, and heart healthy fat such as a little EVOO on her vegetables). For dinner, pt had baked fish with mashed potatoes and gravy and green beans, a small side of canned pears, and a water. Used dinner plate as an example of which food is a carb, protein, and contains some fat. Pt very receptive, stating she is starting to understand.   - limit regular soda pop and sweets, discussed serving sizes. Pt requesting cranberry juice, and is willing to not drink pop, and drink more water, and keep the cranberry juice to one serving with a meal. Pt was very happy with this substitution , and willing to try this.     Encouraged pt to read over written diet education materials provided ( Diabetes patient guide, ADA \"know your numbers\" and meal planning tips) . Pt receptive, pt without any " concerns/questions at this time.     Dietary Orders (From admission, onward)       Start     Ordered    01/19/24 1230  Adult diet Carb Controlled; 60 gram carb/meal, 30 gram Carb evening snack  Diet effective now        Question Answer Comment   Diet type Carb Controlled    Carb diet selection: 60 gram carb/meal, 30 gram Carb evening snack        01/19/24 1229                  Scheduled medications  amLODIPine, 10 mg, oral, Daily  aspirin, 81 mg, oral, Daily  budesonide, 0.5 mg, nebulization, BID  enoxaparin, 30 mg, subcutaneous, q24h  guaiFENesin, 600 mg, oral, BID  insulin lispro, 0-10 Units, subcutaneous, TID with meals  insulin lispro, 6 Units, subcutaneous, TID with meals  insulin NPH (Isophane), 22 Units, subcutaneous, q12h  ipratropium-albuteroL, 3 mL, nebulization, TID  melatonin, 3 mg, oral, Daily  methylPREDNISolone sodium succinate (PF), 40 mg, intravenous, q8h  metoprolol tartrate, 100 mg, oral, Nightly  pantoprazole, 40 mg, oral, Daily before breakfast   Or  pantoprazole, 40 mg, intravenous, Daily before breakfast  rosuvastatin, 10 mg, oral, Nightly      Continuous medications  sodium chloride 0.9%, 100 mL/hr, Last Rate: 100 mL/hr (01/19/24 0955)      PRN medications  PRN medications: acetaminophen, albuterol, benzonatate, dextrose 10 % in water (D10W), dextrose, docusate sodium, glucagon, hydrALAZINE, oxygen     Latest Reference Range & Units 01/19/24 07:17   GLUCOSE 74 - 99 mg/dL 444 (H)   SODIUM 136 - 145 mmol/L 134 (L)   POTASSIUM 3.5 - 5.3 mmol/L 4.1   CHLORIDE 98 - 107 mmol/L 100   Bicarbonate 21 - 32 mmol/L 26   Anion Gap 10 - 20 mmol/L 12   Blood Urea Nitrogen 6 - 23 mg/dL 46 (H)   Creatinine 0.50 - 1.05 mg/dL 2.27 (H)   EGFR >60 mL/min/1.73m*2 23 (L)   Calcium 8.6 - 10.3 mg/dL 8.8   (H): Data is abnormally high  (L): Data is abnormally low    Hemoglobin A1C 9.3% 11/2/23.     Ht: 149.9 cm   Weight         1/15/2024  1457             Weight: 57.6 kg (127 lb)              RDN to follow as needed.  Suggest referral to out RDN or [facility] RDN.

## 2024-01-19 NOTE — PROGRESS NOTES
"Yesy Ayala is a 68 y.o. female on day 3 of admission presenting with COPD exacerbation (CMS/[facility]).    Subjective   Patient feels fine at rest, but dyspneic with even minimal exertion.      Objective   Physical Exam  Constitutional:       General: She is not in acute distress.     Comments: O2 by NC   Neurological:      Mental Status: She is alert.         Last Recorded Vitals  Blood pressure 126/59, pulse 64, temperature 36.4 °C (97.5 °F), temperature source Oral, resp. rate 18, height 1.499 m (4' 11\"), weight 57.6 kg (127 lb), SpO2 98 %.  Intake/Output last 3 Shifts:  I/O last 3 completed shifts:  In: 360 (6.2 mL/kg) [P.O.:360]  Out: - (0 mL/kg)   Weight: 57.6 kg     Relevant Results  Results from last 7 days   Lab Units 01/18/24  1952 01/18/24  1508 01/18/24  1112 01/18/24  0852 01/18/24  0506 01/17/24  2108 01/17/24  0825 01/17/24  0405 01/16/24  0551 01/16/24  0440 01/15/24  1722   POCT GLUCOSE mg/dL 384* 396* 440* 162*  --  383*   < >  --    < >  --   --    GLUCOSE mg/dL  --   --   --   --  82  --   --  176*  --  663* 318*    < > = values in this interval not displayed.            Assessment/Plan   Principal Problem:    COPD exacerbation (CMS/[facility])  Active Problems:    COPD (chronic obstructive pulmonary disease) (CMS/[facility])    Type 2 diabetes mellitus, without long-term current use of insulin (CMS/[facility])    Hyperlipidemia    Hypertension    Peripheral vascular disease (CMS/[facility])    RSV (acute bronchiolitis due to respiratory syncytial virus)    Stage 3a chronic kidney disease (CMS/[facility])      IMPRESSION  TYPE 2 DIABETES MELLITUS WITH HYPERGLYCEMIA  Patient presented with hyperglycemia, further exacerbated by steroid  A1c 9.3% as of 2 months ago indicates chronic hyperglycemia  More hyperglycemic with increased methylprednisolone        RECOMMENDATIONS  Increase NPH to 22 units subcutaneous q12 hours  Increase insulin lispro to 6 units QAC plus scale  Advised she will require insulin at discharge, to be " determined  Appreciate pharmacy consult      Juan Manuel العلي MD

## 2024-01-20 ENCOUNTER — APPOINTMENT (OUTPATIENT)
Dept: RADIOLOGY | Facility: HOSPITAL | Age: 69
End: 2024-01-20
Payer: COMMERCIAL

## 2024-01-20 LAB
ANION GAP SERPL CALC-SCNC: 13 MMOL/L (ref 10–20)
BUN SERPL-MCNC: 37 MG/DL (ref 6–23)
CALCIUM SERPL-MCNC: 8.4 MG/DL (ref 8.6–10.3)
CHLORIDE SERPL-SCNC: 109 MMOL/L (ref 98–107)
CO2 SERPL-SCNC: 20 MMOL/L (ref 21–32)
CREAT SERPL-MCNC: 1.99 MG/DL (ref 0.5–1.05)
EGFRCR SERPLBLD CKD-EPI 2021: 27 ML/MIN/1.73M*2
ERYTHROCYTE [DISTWIDTH] IN BLOOD BY AUTOMATED COUNT: 19.2 % (ref 11.5–14.5)
GLUCOSE BLD MANUAL STRIP-MCNC: 217 MG/DL (ref 74–99)
GLUCOSE BLD MANUAL STRIP-MCNC: 229 MG/DL (ref 74–99)
GLUCOSE BLD MANUAL STRIP-MCNC: 294 MG/DL (ref 74–99)
GLUCOSE BLD MANUAL STRIP-MCNC: 353 MG/DL (ref 74–99)
GLUCOSE BLD MANUAL STRIP-MCNC: 498 MG/DL (ref 74–99)
GLUCOSE SERPL-MCNC: 210 MG/DL (ref 74–99)
HCT VFR BLD AUTO: 41.9 % (ref 36–46)
HGB BLD-MCNC: 13.1 G/DL (ref 12–16)
HOLD SPECIMEN: NORMAL
MCH RBC QN AUTO: 20.6 PG (ref 26–34)
MCHC RBC AUTO-ENTMCNC: 31.3 G/DL (ref 32–36)
MCV RBC AUTO: 66 FL (ref 80–100)
NRBC BLD-RTO: 0 /100 WBCS (ref 0–0)
PLATELET # BLD AUTO: 83 X10*3/UL (ref 150–450)
POTASSIUM SERPL-SCNC: 3.9 MMOL/L (ref 3.5–5.3)
RBC # BLD AUTO: 6.35 X10*6/UL (ref 4–5.2)
SODIUM SERPL-SCNC: 138 MMOL/L (ref 136–145)
WBC # BLD AUTO: 14.1 X10*3/UL (ref 4.4–11.3)

## 2024-01-20 PROCEDURE — 2500000004 HC RX 250 GENERAL PHARMACY W/ HCPCS (ALT 636 FOR OP/ED): Performed by: PHYSICIAN ASSISTANT

## 2024-01-20 PROCEDURE — 36415 COLL VENOUS BLD VENIPUNCTURE: CPT | Performed by: INTERNAL MEDICINE

## 2024-01-20 PROCEDURE — 2500000001 HC RX 250 WO HCPCS SELF ADMINISTERED DRUGS (ALT 637 FOR MEDICARE OP): Performed by: NURSE PRACTITIONER

## 2024-01-20 PROCEDURE — 71250 CT THORAX DX C-: CPT | Performed by: RADIOLOGY

## 2024-01-20 PROCEDURE — 80048 BASIC METABOLIC PNL TOTAL CA: CPT | Performed by: INTERNAL MEDICINE

## 2024-01-20 PROCEDURE — 2500000001 HC RX 250 WO HCPCS SELF ADMINISTERED DRUGS (ALT 637 FOR MEDICARE OP): Performed by: PHYSICIAN ASSISTANT

## 2024-01-20 PROCEDURE — 82947 ASSAY GLUCOSE BLOOD QUANT: CPT

## 2024-01-20 PROCEDURE — 2500000004 HC RX 250 GENERAL PHARMACY W/ HCPCS (ALT 636 FOR OP/ED): Performed by: INTERNAL MEDICINE

## 2024-01-20 PROCEDURE — 2500000002 HC RX 250 W HCPCS SELF ADMINISTERED DRUGS (ALT 637 FOR MEDICARE OP, ALT 636 FOR OP/ED): Performed by: INTERNAL MEDICINE

## 2024-01-20 PROCEDURE — 1200000002 HC GENERAL ROOM WITH TELEMETRY DAILY

## 2024-01-20 PROCEDURE — 99232 SBSQ HOSP IP/OBS MODERATE 35: CPT | Performed by: INTERNAL MEDICINE

## 2024-01-20 PROCEDURE — 2500000002 HC RX 250 W HCPCS SELF ADMINISTERED DRUGS (ALT 637 FOR MEDICARE OP, ALT 636 FOR OP/ED): Performed by: PHYSICIAN ASSISTANT

## 2024-01-20 PROCEDURE — 2500000002 HC RX 250 W HCPCS SELF ADMINISTERED DRUGS (ALT 637 FOR MEDICARE OP, ALT 636 FOR OP/ED): Performed by: HOSPITALIST

## 2024-01-20 PROCEDURE — 85027 COMPLETE CBC AUTOMATED: CPT | Performed by: INTERNAL MEDICINE

## 2024-01-20 PROCEDURE — 94640 AIRWAY INHALATION TREATMENT: CPT

## 2024-01-20 PROCEDURE — 71250 CT THORAX DX C-: CPT

## 2024-01-20 RX ORDER — INSULIN LISPRO 100 [IU]/ML
12 INJECTION, SOLUTION INTRAVENOUS; SUBCUTANEOUS ONCE
Status: COMPLETED | OUTPATIENT
Start: 2024-01-20 | End: 2024-01-20

## 2024-01-20 RX ADMIN — ROSUVASTATIN 10 MG: 10 TABLET, FILM COATED ORAL at 20:25

## 2024-01-20 RX ADMIN — GUAIFENESIN 600 MG: 600 TABLET, EXTENDED RELEASE ORAL at 20:25

## 2024-01-20 RX ADMIN — METHYLPREDNISOLONE SODIUM SUCCINATE 40 MG: 40 INJECTION, POWDER, LYOPHILIZED, FOR SOLUTION INTRAMUSCULAR; INTRAVENOUS at 06:43

## 2024-01-20 RX ADMIN — GUAIFENESIN 600 MG: 600 TABLET, EXTENDED RELEASE ORAL at 08:24

## 2024-01-20 RX ADMIN — INSULIN LISPRO 4 UNITS: 100 INJECTION, SOLUTION INTRAVENOUS; SUBCUTANEOUS at 08:20

## 2024-01-20 RX ADMIN — SODIUM CHLORIDE 100 ML/HR: 9 INJECTION, SOLUTION INTRAVENOUS at 10:03

## 2024-01-20 RX ADMIN — BUDESONIDE 0.5 MG: 0.5 INHALANT ORAL at 07:32

## 2024-01-20 RX ADMIN — ENOXAPARIN SODIUM 30 MG: 30 INJECTION SUBCUTANEOUS at 22:11

## 2024-01-20 RX ADMIN — IPRATROPIUM BROMIDE AND ALBUTEROL SULFATE 3 ML: 2.5; .5 SOLUTION RESPIRATORY (INHALATION) at 15:07

## 2024-01-20 RX ADMIN — BUDESONIDE 0.5 MG: 0.5 INHALANT ORAL at 19:07

## 2024-01-20 RX ADMIN — AMLODIPINE BESYLATE 10 MG: 10 TABLET ORAL at 08:17

## 2024-01-20 RX ADMIN — Medication 3 MG: at 20:29

## 2024-01-20 RX ADMIN — PANTOPRAZOLE SODIUM 40 MG: 40 TABLET, DELAYED RELEASE ORAL at 08:17

## 2024-01-20 RX ADMIN — ASPIRIN 81 MG: 81 TABLET, COATED ORAL at 08:17

## 2024-01-20 RX ADMIN — IPRATROPIUM BROMIDE AND ALBUTEROL SULFATE 3 ML: 2.5; .5 SOLUTION RESPIRATORY (INHALATION) at 19:07

## 2024-01-20 RX ADMIN — INSULIN LISPRO 6 UNITS: 100 INJECTION, SOLUTION INTRAVENOUS; SUBCUTANEOUS at 08:23

## 2024-01-20 RX ADMIN — INSULIN LISPRO 4 UNITS: 100 INJECTION, SOLUTION INTRAVENOUS; SUBCUTANEOUS at 12:40

## 2024-01-20 RX ADMIN — METHYLPREDNISOLONE SODIUM SUCCINATE 40 MG: 40 INJECTION, POWDER, FOR SOLUTION INTRAMUSCULAR; INTRAVENOUS at 17:30

## 2024-01-20 RX ADMIN — IPRATROPIUM BROMIDE AND ALBUTEROL SULFATE 3 ML: 2.5; .5 SOLUTION RESPIRATORY (INHALATION) at 07:32

## 2024-01-20 RX ADMIN — INSULIN HUMAN 22 UNITS: 100 INJECTION, SUSPENSION SUBCUTANEOUS at 20:25

## 2024-01-20 RX ADMIN — INSULIN HUMAN 22 UNITS: 100 INJECTION, SUSPENSION SUBCUTANEOUS at 08:18

## 2024-01-20 RX ADMIN — INSULIN LISPRO 6 UNITS: 100 INJECTION, SOLUTION INTRAVENOUS; SUBCUTANEOUS at 12:39

## 2024-01-20 RX ADMIN — INSULIN LISPRO 12 UNITS: 100 INJECTION, SOLUTION INTRAVENOUS; SUBCUTANEOUS at 20:35

## 2024-01-20 RX ADMIN — INSULIN LISPRO 6 UNITS: 100 INJECTION, SOLUTION INTRAVENOUS; SUBCUTANEOUS at 16:51

## 2024-01-20 RX ADMIN — INSULIN LISPRO 6 UNITS: 100 INJECTION, SOLUTION INTRAVENOUS; SUBCUTANEOUS at 16:53

## 2024-01-20 RX ADMIN — METOPROLOL TARTRATE 100 MG: 50 TABLET, FILM COATED ORAL at 20:25

## 2024-01-20 ASSESSMENT — PAIN SCALES - GENERAL
PAINLEVEL_OUTOF10: 0 - NO PAIN

## 2024-01-20 ASSESSMENT — COGNITIVE AND FUNCTIONAL STATUS - GENERAL
DAILY ACTIVITIY SCORE: 24
MOBILITY SCORE: 22
MOBILITY SCORE: 23
MOVING FROM LYING ON BACK TO SITTING ON SIDE OF FLAT BED WITH BEDRAILS: A LITTLE
DAILY ACTIVITIY SCORE: 24
CLIMB 3 TO 5 STEPS WITH RAILING: A LITTLE
DAILY ACTIVITIY SCORE: 24
CLIMB 3 TO 5 STEPS WITH RAILING: A LITTLE

## 2024-01-20 ASSESSMENT — PAIN - FUNCTIONAL ASSESSMENT
PAIN_FUNCTIONAL_ASSESSMENT: 0-10
PAIN_FUNCTIONAL_ASSESSMENT: 0-10

## 2024-01-20 NOTE — PROGRESS NOTES
"Yesy Ayala is a 68 y.o. female on day 4 of admission presenting with COPD exacerbation (CMS/[facility]).    Subjective   Patient states that she has no difficulty breathing if she lies still.  However, with minimal exertion, she develops dyspnea.  She is tolerating meals well.    I have reviewed histories, allergies and medications have been reviewed and there are no changes       Objective     Physical Exam  Vitals and nursing note reviewed.   Constitutional:       General: She is not in acute distress.     Appearance: Normal appearance. She is normal weight.   HENT:      Head: Normocephalic and atraumatic.      Nose: Nose normal.      Mouth/Throat:      Mouth: Mucous membranes are moist.   Eyes:      Extraocular Movements: Extraocular movements intact.   Pulmonary:      Effort: Pulmonary effort is normal.      Comments: Oxygen in situ via nasal cannula   Musculoskeletal:         General: Normal range of motion.   Neurological:      Mental Status: She is alert and oriented to person, place, and time.   Psychiatric:         Mood and Affect: Mood normal.         Last Recorded Vitals  Blood pressure 109/69, pulse 79, temperature 36.1 °C (97 °F), temperature source Temporal, resp. rate 18, height 1.499 m (4' 11\"), weight 57.6 kg (127 lb), SpO2 96 %.  Intake/Output last 3 Shifts:  I/O last 3 completed shifts:  In: 480 (8.3 mL/kg) [P.O.:480]  Out: 0 (0 mL/kg)   Weight: 57.6 kg     Relevant Results  Results from last 7 days   Lab Units 01/20/24  0802 01/20/24  0754 01/19/24  1935 01/19/24  1742 01/19/24  1723 01/19/24  1155 01/19/24  0734 01/19/24  0717 01/18/24  0852 01/18/24  0506 01/17/24  0825 01/17/24  0405 01/16/24  0551 01/16/24  0440   POCT GLUCOSE mg/dL  --  217* 421* 327* 363* 505*   < >  --    < >  --    < >  --    < >  --    GLUCOSE mg/dL 210*  --   --   --   --   --   --  444*  --  82  --  176*  --  663*    < > = values in this interval not displayed.         Scheduled medications  amLODIPine, 10 mg, oral, " Daily  aspirin, 81 mg, oral, Daily  budesonide, 0.5 mg, nebulization, BID  enoxaparin, 30 mg, subcutaneous, q24h  guaiFENesin, 600 mg, oral, BID  insulin lispro, 0-10 Units, subcutaneous, TID with meals  insulin lispro, 6 Units, subcutaneous, TID with meals  insulin NPH (Isophane), 22 Units, subcutaneous, q12h  ipratropium-albuteroL, 3 mL, nebulization, TID  melatonin, 3 mg, oral, Daily  methylPREDNISolone sodium succinate (PF), 40 mg, intravenous, q12h  metoprolol tartrate, 100 mg, oral, Nightly  pantoprazole, 40 mg, oral, Daily before breakfast   Or  pantoprazole, 40 mg, intravenous, Daily before breakfast  rosuvastatin, 10 mg, oral, Nightly      Continuous medications  sodium chloride 0.9%, 100 mL/hr, Last Rate: 100 mL/hr (01/20/24 1003)      PRN medications  PRN medications: acetaminophen, albuterol, benzonatate, dextrose 10 % in water (D10W), dextrose, docusate sodium, glucagon, hydrALAZINE, oxygen    Results for orders placed or performed during the hospital encounter of 01/15/24 (from the past 24 hour(s))   POCT GLUCOSE   Result Value Ref Range    POCT Glucose 363 (H) 74 - 99 mg/dL   POCT GLUCOSE   Result Value Ref Range    POCT Glucose 327 (H) 74 - 99 mg/dL   POCT GLUCOSE   Result Value Ref Range    POCT Glucose 421 (H) 74 - 99 mg/dL   POCT GLUCOSE   Result Value Ref Range    POCT Glucose 217 (H) 74 - 99 mg/dL   CBC   Result Value Ref Range    WBC 14.1 (H) 4.4 - 11.3 x10*3/uL    nRBC 0.0 0.0 - 0.0 /100 WBCs    RBC 6.35 (H) 4.00 - 5.20 x10*6/uL    Hemoglobin 13.1 12.0 - 16.0 g/dL    Hematocrit 41.9 36.0 - 46.0 %    MCV 66 (L) 80 - 100 fL    MCH 20.6 (L) 26.0 - 34.0 pg    MCHC 31.3 (L) 32.0 - 36.0 g/dL    RDW 19.2 (H) 11.5 - 14.5 %    Platelets 83 (L) 150 - 450 x10*3/uL   Basic metabolic panel   Result Value Ref Range    Glucose 210 (H) 74 - 99 mg/dL    Sodium 138 136 - 145 mmol/L    Potassium 3.9 3.5 - 5.3 mmol/L    Chloride 109 (H) 98 - 107 mmol/L    Bicarbonate 20 (L) 21 - 32 mmol/L    Anion Gap 13 10 - 20  mmol/L    Urea Nitrogen 37 (H) 6 - 23 mg/dL    Creatinine 1.99 (H) 0.50 - 1.05 mg/dL    eGFR 27 (L) >60 mL/min/1.73m*2    Calcium 8.4 (L) 8.6 - 10.3 mg/dL   PST Top   Result Value Ref Range    Extra Tube Hold for add-ons.       ECG 12 lead    Result Date: 1/16/2024  See ED provider note for full interpretation and clinical correlation Confirmed by Aminata Farmer ([id number]) on 1/16/2024 4:31:32 PM    XR chest 2 views    Result Date: 1/15/2024  STUDY: Chest Radiographs;  01/15/2024 INDICATION: Shortness of breath. COMPARISON: 09/09/2019 XR chest ACCESSION NUMBER(S): [id number] ORDERING CLINICIAN: TECHNIQUE:  Frontal and lateral chest. FINDINGS: CARDIOMEDIASTINAL SILHOUETTE: Cardiomediastinal silhouette is normal in size and configuration.  LUNGS: Lungs are clear.  ABDOMEN: No remarkable upper abdominal findings.  BONES: No acute osseous changes.    No acute pulmonary abnormality. Signed by Ceasar Davies MD      Assessment/Plan   Principal Problem:    COPD exacerbation (CMS/[facility])  Active Problems:    COPD (chronic obstructive pulmonary disease) (CMS/[facility])    Type 2 diabetes mellitus, without long-term current use of insulin (CMS/[facility])    Hyperlipidemia    Hypertension    Peripheral vascular disease (CMS/HCC)    RSV (acute bronchiolitis due to respiratory syncytial virus)    Stage 3a chronic kidney disease (CMS/[facility])    IMPRESSION  TYPE 2 DIABETES MELLITUS WITH HYPERGLYCEMIA  Patient presented with hyperglycemia, further exacerbated by steroid  A1c 9.3% as of 2 months ago indicates chronic hyperglycemia  Solumedrol decreased to 40mg IV twice daily today         RECOMMENDATIONS  To continue NPH 22 units subcutaneous q12 hours  To continue  insulin lispro to 6 units QAC    To continue insulin correction scale TID AC   Diabetic diet as tolerated   Accu-Cheks ACHS    Hypoglycemic protocol   Will continue to follow      Kae Calvo MD

## 2024-01-20 NOTE — CONSULTS
"Consult Note    Patient is a 68 y.o. female with a past medical history of COPD, diabetes, hypertension, peripheral vascular disease, coronary artery disease, thrombocytopenia and small bilateral pulmonary nodules, who was admitted to [facility] on January 15, 2024 with shortness of breath and cough.  In the emergency room she was noted to have a temperature of 37.0, pulse 95, respiratory rate 34, blood pressure 168/91 and oxygen saturation of 100% on room air.  She was found to have a normal white blood count, low platelet count, elevated creatinine, normal liver function test, elevated troponin, negative coronavirus and influenza A/B PCR, positive RSV PCR, venous blood gas showing a pH of 7.37, pCO2 51 and a bicarbonate of 30 and an admission chest x-ray showing hyperinflation without infiltrates or effusions.  The patient was treated with DuoNeb, Pulmicort, Solu-Medrol, Lovenox and Tessalon.  Presently, the patient feels \"fine\".  She denies fevers, chills, sweats, leg pain, postnasal drip or weight loss, but does admit to shortness of breath at rest, dyspnea on exertion, chest \"congestion\", and nonproductive cough and wheezing.  She states that she uses albuterol and Symbicort at home but is not on home oxygen and has no history of sleep apnea.    Past medical history: As above.    Allergies: Per EMR.    Medications: Per EMR.    Social history:  Cigarettes-smoked a third of a pack a day for 15 years but quit 1 week ago.  Alcohol-none.  Pets, hobbies and travel history-noncontributory.  Occupation-retired [employer] .    Family history:  Pulmonary disease-mother (?type--on oxygen).  Coronary artery disease-grandmother.    Hypertension-none.  Diabetes-none.  Cancer-none.    Review of systems:  Swallowing problems-denies.  GI-none.  -none.  Musculoskeletal-none.  Skin-no rashes.    Visit Vitals  /69 (BP Location: Left arm, Patient Position: Lying)   Pulse 79   Temp 36.1 °C (97 " °F) (Temporal)   Resp 18      Physical Exam:  Gen:  Awake, alert and in NAD; on 3 L nasal cannula oxygen.  HEENT:  Normal conjunctivae and pharynx; no sinus tenderness.  Neck:  No thyroid enlargement or adenopathy.  Pulm: Bilateral wheezing without rales or rhonchi.    Heart:  RRR without S3, S4 or murmurs.  Abd:  Soft, non-tender and with positive bowel sounds.  Extrem:  No clubbing or edema.  Skin:  No rashes.    Scheduled medications  amLODIPine, 10 mg, oral, Daily  aspirin, 81 mg, oral, Daily  budesonide, 0.5 mg, nebulization, BID  enoxaparin, 30 mg, subcutaneous, q24h  guaiFENesin, 600 mg, oral, BID  insulin lispro, 0-10 Units, subcutaneous, TID with meals  insulin lispro, 6 Units, subcutaneous, TID with meals  insulin NPH (Isophane), 22 Units, subcutaneous, q12h  ipratropium-albuteroL, 3 mL, nebulization, TID  melatonin, 3 mg, oral, Daily  methylPREDNISolone sodium succinate (PF), 40 mg, intravenous, q12h  metoprolol tartrate, 100 mg, oral, Nightly  pantoprazole, 40 mg, oral, Daily before breakfast   Or  pantoprazole, 40 mg, intravenous, Daily before breakfast  rosuvastatin, 10 mg, oral, Nightly    Continuous medications  sodium chloride 0.9%, 100 mL/hr, Last Rate: 100 mL/hr (01/20/24 1003)    PRN medications  PRN medications: acetaminophen, albuterol, benzonatate, dextrose 10 % in water (D10W), dextrose, docusate sodium, glucagon, hydrALAZINE, oxygen     Results for orders placed or performed during the hospital encounter of 01/15/24 (from the past 96 hour(s))   POCT GLUCOSE   Result Value Ref Range    POCT Glucose 143 (H) 74 - 99 mg/dL   CBC   Result Value Ref Range    WBC 19.8 (H) 4.4 - 11.3 x10*3/uL    nRBC 0.0 0.0 - 0.0 /100 WBCs    RBC 6.05 (H) 4.00 - 5.20 x10*6/uL    Hemoglobin 12.6 12.0 - 16.0 g/dL    Hematocrit 39.6 36.0 - 46.0 %    MCV 66 (L) 80 - 100 fL    MCH 20.8 (L) 26.0 - 34.0 pg    MCHC 31.8 (L) 32.0 - 36.0 g/dL    RDW 18.7 (H) 11.5 - 14.5 %    Platelets 73 (L) 150 - 450 x10*3/uL   POCT GLUCOSE    Result Value Ref Range    POCT Glucose 513 (H) 74 - 99 mg/dL   CBC   Result Value Ref Range    WBC 21.7 (H) 4.4 - 11.3 x10*3/uL    nRBC 0.0 0.0 - 0.0 /100 WBCs    RBC 5.88 (H) 4.00 - 5.20 x10*6/uL    Hemoglobin 12.5 12.0 - 16.0 g/dL    Hematocrit 38.4 36.0 - 46.0 %    MCV 65 (L) 80 - 100 fL    MCH 21.3 (L) 26.0 - 34.0 pg    MCHC 32.6 32.0 - 36.0 g/dL    RDW 18.7 (H) 11.5 - 14.5 %    Platelets 76 (L) 150 - 450 x10*3/uL   Basic metabolic panel   Result Value Ref Range    Glucose 176 (H) 74 - 99 mg/dL    Sodium 138 136 - 145 mmol/L    Potassium 3.7 3.5 - 5.3 mmol/L    Chloride 105 98 - 107 mmol/L    Bicarbonate 23 21 - 32 mmol/L    Anion Gap 14 10 - 20 mmol/L    Urea Nitrogen 22 6 - 23 mg/dL    Creatinine 1.67 (H) 0.50 - 1.05 mg/dL    eGFR 33 (L) >60 mL/min/1.73m*2    Calcium 9.3 8.6 - 10.3 mg/dL   POCT GLUCOSE   Result Value Ref Range    POCT Glucose 263 (H) 74 - 99 mg/dL   POCT GLUCOSE   Result Value Ref Range    POCT Glucose 419 (H) 74 - 99 mg/dL   POCT GLUCOSE   Result Value Ref Range    POCT Glucose 281 (H) 74 - 99 mg/dL   POCT GLUCOSE   Result Value Ref Range    POCT Glucose 383 (H) 74 - 99 mg/dL   Basic metabolic panel   Result Value Ref Range    Glucose 82 74 - 99 mg/dL    Sodium 140 136 - 145 mmol/L    Potassium 3.5 3.5 - 5.3 mmol/L    Chloride 105 98 - 107 mmol/L    Bicarbonate 25 21 - 32 mmol/L    Anion Gap 14 10 - 20 mmol/L    Urea Nitrogen 36 (H) 6 - 23 mg/dL    Creatinine 1.98 (H) 0.50 - 1.05 mg/dL    eGFR 27 (L) >60 mL/min/1.73m*2    Calcium 9.0 8.6 - 10.3 mg/dL   CBC   Result Value Ref Range    WBC 19.9 (H) 4.4 - 11.3 x10*3/uL    nRBC 0.0 0.0 - 0.0 /100 WBCs    RBC 6.00 (H) 4.00 - 5.20 x10*6/uL    Hemoglobin 12.4 12.0 - 16.0 g/dL    Hematocrit 39.0 36.0 - 46.0 %    MCV 65 (L) 80 - 100 fL    MCH 20.7 (L) 26.0 - 34.0 pg    MCHC 31.8 (L) 32.0 - 36.0 g/dL    RDW 18.7 (H) 11.5 - 14.5 %    Platelets 72 (L) 150 - 450 x10*3/uL   POCT GLUCOSE   Result Value Ref Range    POCT Glucose 162 (H) 74 - 99 mg/dL    POCT GLUCOSE   Result Value Ref Range    POCT Glucose 440 (H) 74 - 99 mg/dL   POCT GLUCOSE   Result Value Ref Range    POCT Glucose 396 (H) 74 - 99 mg/dL   POCT GLUCOSE   Result Value Ref Range    POCT Glucose 384 (H) 74 - 99 mg/dL   Basic metabolic panel   Result Value Ref Range    Glucose 444 (H) 74 - 99 mg/dL    Sodium 134 (L) 136 - 145 mmol/L    Potassium 4.1 3.5 - 5.3 mmol/L    Chloride 100 98 - 107 mmol/L    Bicarbonate 26 21 - 32 mmol/L    Anion Gap 12 10 - 20 mmol/L    Urea Nitrogen 46 (H) 6 - 23 mg/dL    Creatinine 2.27 (H) 0.50 - 1.05 mg/dL    eGFR 23 (L) >60 mL/min/1.73m*2    Calcium 8.8 8.6 - 10.3 mg/dL   CBC   Result Value Ref Range    WBC 15.3 (H) 4.4 - 11.3 x10*3/uL    nRBC 0.0 0.0 - 0.0 /100 WBCs    RBC 6.34 (H) 4.00 - 5.20 x10*6/uL    Hemoglobin 13.3 12.0 - 16.0 g/dL    Hematocrit 41.2 36.0 - 46.0 %    MCV 65 (L) 80 - 100 fL    MCH 21.0 (L) 26.0 - 34.0 pg    MCHC 32.3 32.0 - 36.0 g/dL    RDW 19.1 (H) 11.5 - 14.5 %    Platelets 73 (L) 150 - 450 x10*3/uL   POCT GLUCOSE   Result Value Ref Range    POCT Glucose 448 (H) 74 - 99 mg/dL   POCT GLUCOSE   Result Value Ref Range    POCT Glucose 505 (H) 74 - 99 mg/dL   POCT GLUCOSE   Result Value Ref Range    POCT Glucose 363 (H) 74 - 99 mg/dL   POCT GLUCOSE   Result Value Ref Range    POCT Glucose 327 (H) 74 - 99 mg/dL   POCT GLUCOSE   Result Value Ref Range    POCT Glucose 421 (H) 74 - 99 mg/dL   POCT GLUCOSE   Result Value Ref Range    POCT Glucose 217 (H) 74 - 99 mg/dL   CBC   Result Value Ref Range    WBC 14.1 (H) 4.4 - 11.3 x10*3/uL    nRBC 0.0 0.0 - 0.0 /100 WBCs    RBC 6.35 (H) 4.00 - 5.20 x10*6/uL    Hemoglobin 13.1 12.0 - 16.0 g/dL    Hematocrit 41.9 36.0 - 46.0 %    MCV 66 (L) 80 - 100 fL    MCH 20.6 (L) 26.0 - 34.0 pg    MCHC 31.3 (L) 32.0 - 36.0 g/dL    RDW 19.2 (H) 11.5 - 14.5 %    Platelets 83 (L) 150 - 450 x10*3/uL   Basic metabolic panel   Result Value Ref Range    Glucose 210 (H) 74 - 99 mg/dL    Sodium 138 136 - 145 mmol/L    Potassium 3.9  3.5 - 5.3 mmol/L    Chloride 109 (H) 98 - 107 mmol/L    Bicarbonate 20 (L) 21 - 32 mmol/L    Anion Gap 13 10 - 20 mmol/L    Urea Nitrogen 37 (H) 6 - 23 mg/dL    Creatinine 1.99 (H) 0.50 - 1.05 mg/dL    eGFR 27 (L) >60 mL/min/1.73m*2    Calcium 8.4 (L) 8.6 - 10.3 mg/dL   PST Top   Result Value Ref Range    Extra Tube Hold for add-ons.         ECG 12 lead    Result Date: 1/16/2024  See ED provider note for full interpretation and clinical correlation Confirmed by Aminata Farmer ([id number]) on 1/16/2024 4:31:32 PM    XR chest 2 views    Result Date: 1/15/2024  STUDY: Chest Radiographs;  01/15/2024 INDICATION: Shortness of breath. COMPARISON: 09/09/2019 XR chest ACCESSION NUMBER(S): [id number] ORDERING CLINICIAN: TECHNIQUE:  Frontal and lateral chest. FINDINGS: CARDIOMEDIASTINAL SILHOUETTE: Cardiomediastinal silhouette is normal in size and configuration.  LUNGS: Lungs are clear.  ABDOMEN: No remarkable upper abdominal findings.  BONES: No acute osseous changes.  No acute pulmonary abnormality. Signed by Ceasar Davies MD    Low-dose CT scan of the chest (February 20, 2023)--COPD changes; bilateral less than 4 mm diameter pulmonary nodules; no adenopathy.     Assessment:  68-year-old woman with a history of COPD, diabetes, hypertension, peripheral vascular disease, coronary artery disease, thrombocytopenia and small bilateral pulmonary nodules, admitted with shortness of breath and cough, and found to have RSV infection, and bronchospasm most likely due to an acute COPD exacerbation.  The patient has persistent bronchospasm presently.  There is no evidence of viral pneumonia.  Pulmonary embolism is unlikely, but cannot be excluded.  She has stable small bilateral pulmonary nodules which are most likely benign.    Recommend:  1.  Continue DuoNeb, budesonide, Solu-Medrol and Lovenox.  2.  Consider Lower extremity ultrasound to rule out DVT.  3.  Wean FiO2 to keep oxygen saturation approximately 92 to 95%.  4.   Incentive spirometry.  5.  CT scan of the chest without contrast to assess pulmonary nodules.  6.  Follow-up with pulmonary medicine after discharge.    Arnulfo Oropeza MD

## 2024-01-20 NOTE — PROGRESS NOTES
"Yesy Ayala is a 68 y.o. female on day 4 of admission presenting with COPD exacerbation (CMS/HCC).    Subjective    still on 3 L of nasal cannula oxygen, having dyspnea on exertion, seen by pulmonary will have a CT scan without contrast to assess pulmonary nodules.  Blood glucose being managed by endocrinology.  I did decrease Solu-Medrol to 40 mg twice daily.       Objective     Physical Exam  Constitutional:       Appearance: Normal appearance.   HENT:      Head: Normocephalic.      Nose: Nose normal.      Mouth/Throat:      Mouth: Mucous membranes are moist.   Cardiovascular:      Rate and Rhythm: Normal rate and regular rhythm.      Pulses: Normal pulses.      Heart sounds: Normal heart sounds.   Pulmonary:      Comments: Wheezing bilaterally  Abdominal:      General: Abdomen is flat. Bowel sounds are normal.      Palpations: Abdomen is soft.   Skin:     General: Skin is warm.      Capillary Refill: Capillary refill takes less than 2 seconds.   Neurological:      General: No focal deficit present.      Mental Status: She is alert.         Last Recorded Vitals  Blood pressure 109/69, pulse 79, temperature 36.1 °C (97 °F), temperature source Temporal, resp. rate 18, height 1.499 m (4' 11\"), weight 57.6 kg (127 lb), SpO2 96 %.  Intake/Output last 3 Shifts:  I/O last 3 completed shifts:  In: 480 (8.3 mL/kg) [P.O.:480]  Out: 0 (0 mL/kg)   Weight: 57.6 kg     Relevant Results  Results for orders placed or performed during the hospital encounter of 01/15/24 (from the past 24 hour(s))   POCT GLUCOSE   Result Value Ref Range    POCT Glucose 505 (H) 74 - 99 mg/dL   POCT GLUCOSE   Result Value Ref Range    POCT Glucose 363 (H) 74 - 99 mg/dL   POCT GLUCOSE   Result Value Ref Range    POCT Glucose 327 (H) 74 - 99 mg/dL   POCT GLUCOSE   Result Value Ref Range    POCT Glucose 421 (H) 74 - 99 mg/dL   POCT GLUCOSE   Result Value Ref Range    POCT Glucose 217 (H) 74 - 99 mg/dL   CBC   Result Value Ref Range    WBC 14.1 (H) 4.4 - " 11.3 x10*3/uL    nRBC 0.0 0.0 - 0.0 /100 WBCs    RBC 6.35 (H) 4.00 - 5.20 x10*6/uL    Hemoglobin 13.1 12.0 - 16.0 g/dL    Hematocrit 41.9 36.0 - 46.0 %    MCV 66 (L) 80 - 100 fL    MCH 20.6 (L) 26.0 - 34.0 pg    MCHC 31.3 (L) 32.0 - 36.0 g/dL    RDW 19.2 (H) 11.5 - 14.5 %    Platelets 83 (L) 150 - 450 x10*3/uL   Basic metabolic panel   Result Value Ref Range    Glucose 210 (H) 74 - 99 mg/dL    Sodium 138 136 - 145 mmol/L    Potassium 3.9 3.5 - 5.3 mmol/L    Chloride 109 (H) 98 - 107 mmol/L    Bicarbonate 20 (L) 21 - 32 mmol/L    Anion Gap 13 10 - 20 mmol/L    Urea Nitrogen 37 (H) 6 - 23 mg/dL    Creatinine 1.99 (H) 0.50 - 1.05 mg/dL    eGFR 27 (L) >60 mL/min/1.73m*2    Calcium 8.4 (L) 8.6 - 10.3 mg/dL   PST Top   Result Value Ref Range    Extra Tube Hold for add-ons.        Imaging Results  Cxr: negative    Medications:  amLODIPine, 10 mg, oral, Daily  aspirin, 81 mg, oral, Daily  budesonide, 0.5 mg, nebulization, BID  enoxaparin, 30 mg, subcutaneous, q24h  guaiFENesin, 600 mg, oral, BID  insulin lispro, 0-10 Units, subcutaneous, TID with meals  insulin lispro, 6 Units, subcutaneous, TID with meals  insulin NPH (Isophane), 22 Units, subcutaneous, q12h  ipratropium-albuteroL, 3 mL, nebulization, TID  melatonin, 3 mg, oral, Daily  methylPREDNISolone sodium succinate (PF), 40 mg, intravenous, q12h  metoprolol tartrate, 100 mg, oral, Nightly  pantoprazole, 40 mg, oral, Daily before breakfast   Or  pantoprazole, 40 mg, intravenous, Daily before breakfast  rosuvastatin, 10 mg, oral, Nightly       PRN medications: acetaminophen, albuterol, benzonatate, dextrose 10 % in water (D10W), dextrose, docusate sodium, glucagon, hydrALAZINE, oxygen     Assessment/Plan   COPD exacerbation secondary to RSV  -Continue Pulmicort, Magaly,   -Currently on Solu-Medrol 40 mg IV every 12 hours, on 3 L of nasal cannula oxygen,   Appreciated pulmonary input, they will get a CT scan without contrast to assess pulmonary nodules    2.   Diabetes  -Significant hyperglycemia likely she will need to be discharged on insulin NPH, currently at 22 units, endocrinology on board  - insulin lispro 6 units 3 times daily with meals    3.  Hyperlipidemia  -Continue rosuvastatin oral iron    4.  Acute kidney injury on CKD 3  -Baseline creatinine is around 1.3 patient bumped up to 2.27 now trending down currently 1.9,  continue IV hydration    DVT Prophylaxis:  Lovenox subq        Renita Weaver MD  [facility]

## 2024-01-20 NOTE — CARE PLAN
The patient's goals for the shift include decreased SOB     The clinical goals for the shift include improve blood sugar control, improve breathing      Problem: Safety - Adult  Goal: Free from fall injury  Outcome: Progressing     Problem: Chronic Conditions and Co-morbidities  Goal: Patient's chronic conditions and co-morbidity symptoms are monitored and maintained or improved  Outcome: Progressing     Problem: Diabetes  Goal: Increase stability of blood glucose readings by end of shift  Outcome: Progressing     Problem: Respiratory  Goal: Minimal/no exertional discomfort or dyspnea this shift  Outcome: Progressing     Problem: Respiratory  Goal: No signs of respiratory distress (eg. Use of accessory muscles. Peds grunting)  Outcome: Progressing

## 2024-01-21 LAB
ANION GAP SERPL CALC-SCNC: 10 MMOL/L (ref 10–20)
BUN SERPL-MCNC: 31 MG/DL (ref 6–23)
CALCIUM SERPL-MCNC: 8.3 MG/DL (ref 8.6–10.3)
CHLORIDE SERPL-SCNC: 111 MMOL/L (ref 98–107)
CO2 SERPL-SCNC: 22 MMOL/L (ref 21–32)
CREAT SERPL-MCNC: 1.82 MG/DL (ref 0.5–1.05)
EGFRCR SERPLBLD CKD-EPI 2021: 30 ML/MIN/1.73M*2
ERYTHROCYTE [DISTWIDTH] IN BLOOD BY AUTOMATED COUNT: 19.6 % (ref 11.5–14.5)
GLUCOSE BLD MANUAL STRIP-MCNC: 147 MG/DL (ref 74–99)
GLUCOSE BLD MANUAL STRIP-MCNC: 239 MG/DL (ref 74–99)
GLUCOSE BLD MANUAL STRIP-MCNC: 293 MG/DL (ref 74–99)
GLUCOSE BLD MANUAL STRIP-MCNC: 325 MG/DL (ref 74–99)
GLUCOSE SERPL-MCNC: 259 MG/DL (ref 74–99)
HCT VFR BLD AUTO: 43 % (ref 36–46)
HGB BLD-MCNC: 13.7 G/DL (ref 12–16)
HOLD SPECIMEN: NORMAL
MCH RBC QN AUTO: 20.8 PG (ref 26–34)
MCHC RBC AUTO-ENTMCNC: 31.9 G/DL (ref 32–36)
MCV RBC AUTO: 65 FL (ref 80–100)
NRBC BLD-RTO: 0 /100 WBCS (ref 0–0)
PLATELET # BLD AUTO: 92 X10*3/UL (ref 150–450)
POTASSIUM SERPL-SCNC: 4.3 MMOL/L (ref 3.5–5.3)
RBC # BLD AUTO: 6.59 X10*6/UL (ref 4–5.2)
SODIUM SERPL-SCNC: 139 MMOL/L (ref 136–145)
WBC # BLD AUTO: 14.3 X10*3/UL (ref 4.4–11.3)

## 2024-01-21 PROCEDURE — 2500000002 HC RX 250 W HCPCS SELF ADMINISTERED DRUGS (ALT 637 FOR MEDICARE OP, ALT 636 FOR OP/ED): Performed by: INTERNAL MEDICINE

## 2024-01-21 PROCEDURE — 2500000004 HC RX 250 GENERAL PHARMACY W/ HCPCS (ALT 636 FOR OP/ED): Performed by: INTERNAL MEDICINE

## 2024-01-21 PROCEDURE — 80048 BASIC METABOLIC PNL TOTAL CA: CPT | Performed by: INTERNAL MEDICINE

## 2024-01-21 PROCEDURE — 99232 SBSQ HOSP IP/OBS MODERATE 35: CPT | Performed by: INTERNAL MEDICINE

## 2024-01-21 PROCEDURE — 2500000004 HC RX 250 GENERAL PHARMACY W/ HCPCS (ALT 636 FOR OP/ED): Performed by: PHYSICIAN ASSISTANT

## 2024-01-21 PROCEDURE — 1200000002 HC GENERAL ROOM WITH TELEMETRY DAILY

## 2024-01-21 PROCEDURE — 2500000001 HC RX 250 WO HCPCS SELF ADMINISTERED DRUGS (ALT 637 FOR MEDICARE OP): Performed by: PHYSICIAN ASSISTANT

## 2024-01-21 PROCEDURE — C9113 INJ PANTOPRAZOLE SODIUM, VIA: HCPCS | Performed by: PHYSICIAN ASSISTANT

## 2024-01-21 PROCEDURE — 2500000002 HC RX 250 W HCPCS SELF ADMINISTERED DRUGS (ALT 637 FOR MEDICARE OP, ALT 636 FOR OP/ED): Performed by: PHYSICIAN ASSISTANT

## 2024-01-21 PROCEDURE — 94640 AIRWAY INHALATION TREATMENT: CPT

## 2024-01-21 PROCEDURE — 36415 COLL VENOUS BLD VENIPUNCTURE: CPT | Performed by: INTERNAL MEDICINE

## 2024-01-21 PROCEDURE — 82947 ASSAY GLUCOSE BLOOD QUANT: CPT

## 2024-01-21 PROCEDURE — 85027 COMPLETE CBC AUTOMATED: CPT | Performed by: INTERNAL MEDICINE

## 2024-01-21 PROCEDURE — 2500000001 HC RX 250 WO HCPCS SELF ADMINISTERED DRUGS (ALT 637 FOR MEDICARE OP): Performed by: NURSE PRACTITIONER

## 2024-01-21 RX ORDER — FORMOTEROL FUMARATE DIHYDRATE 20 UG/2ML
20 SOLUTION RESPIRATORY (INHALATION)
Status: DISCONTINUED | OUTPATIENT
Start: 2024-01-21 | End: 2024-01-25 | Stop reason: HOSPADM

## 2024-01-21 RX ORDER — DICLOFENAC SODIUM 10 MG/G
4 GEL TOPICAL 2 TIMES DAILY
Status: DISCONTINUED | OUTPATIENT
Start: 2024-01-21 | End: 2024-01-25 | Stop reason: HOSPADM

## 2024-01-21 RX ADMIN — IPRATROPIUM BROMIDE AND ALBUTEROL SULFATE 3 ML: 2.5; .5 SOLUTION RESPIRATORY (INHALATION) at 07:23

## 2024-01-21 RX ADMIN — PANTOPRAZOLE SODIUM 40 MG: 40 INJECTION, POWDER, FOR SOLUTION INTRAVENOUS at 06:01

## 2024-01-21 RX ADMIN — ROSUVASTATIN 10 MG: 10 TABLET, FILM COATED ORAL at 21:11

## 2024-01-21 RX ADMIN — IPRATROPIUM BROMIDE AND ALBUTEROL SULFATE 3 ML: 2.5; .5 SOLUTION RESPIRATORY (INHALATION) at 18:45

## 2024-01-21 RX ADMIN — IPRATROPIUM BROMIDE AND ALBUTEROL SULFATE 3 ML: 2.5; .5 SOLUTION RESPIRATORY (INHALATION) at 14:54

## 2024-01-21 RX ADMIN — INSULIN HUMAN 22 UNITS: 100 INJECTION, SUSPENSION SUBCUTANEOUS at 09:06

## 2024-01-21 RX ADMIN — Medication 3 MG: at 21:14

## 2024-01-21 RX ADMIN — INSULIN LISPRO 4 UNITS: 100 INJECTION, SOLUTION INTRAVENOUS; SUBCUTANEOUS at 13:08

## 2024-01-21 RX ADMIN — INSULIN LISPRO 8 UNITS: 100 INJECTION, SOLUTION INTRAVENOUS; SUBCUTANEOUS at 17:24

## 2024-01-21 RX ADMIN — METHYLPREDNISOLONE SODIUM SUCCINATE 40 MG: 40 INJECTION, POWDER, FOR SOLUTION INTRAMUSCULAR; INTRAVENOUS at 17:53

## 2024-01-21 RX ADMIN — INSULIN LISPRO 6 UNITS: 100 INJECTION, SOLUTION INTRAVENOUS; SUBCUTANEOUS at 13:09

## 2024-01-21 RX ADMIN — BUDESONIDE 0.5 MG: 0.5 INHALANT ORAL at 07:23

## 2024-01-21 RX ADMIN — ENOXAPARIN SODIUM 30 MG: 30 INJECTION SUBCUTANEOUS at 21:42

## 2024-01-21 RX ADMIN — INSULIN LISPRO 6 UNITS: 100 INJECTION, SOLUTION INTRAVENOUS; SUBCUTANEOUS at 17:23

## 2024-01-21 RX ADMIN — BUDESONIDE 0.5 MG: 0.5 INHALANT ORAL at 18:45

## 2024-01-21 RX ADMIN — FORMOTEROL FUMARATE DIHYDRATE 20 MCG: 20 SOLUTION RESPIRATORY (INHALATION) at 18:45

## 2024-01-21 RX ADMIN — SODIUM CHLORIDE 100 ML/HR: 9 INJECTION, SOLUTION INTRAVENOUS at 15:07

## 2024-01-21 RX ADMIN — METOPROLOL TARTRATE 100 MG: 50 TABLET, FILM COATED ORAL at 21:11

## 2024-01-21 RX ADMIN — ASPIRIN 81 MG: 81 TABLET, COATED ORAL at 09:04

## 2024-01-21 RX ADMIN — INSULIN LISPRO 6 UNITS: 100 INJECTION, SOLUTION INTRAVENOUS; SUBCUTANEOUS at 09:04

## 2024-01-21 RX ADMIN — AMLODIPINE BESYLATE 10 MG: 10 TABLET ORAL at 09:04

## 2024-01-21 RX ADMIN — INSULIN HUMAN 22 UNITS: 100 INJECTION, SUSPENSION SUBCUTANEOUS at 21:11

## 2024-01-21 RX ADMIN — GUAIFENESIN 600 MG: 600 TABLET, EXTENDED RELEASE ORAL at 09:04

## 2024-01-21 RX ADMIN — GUAIFENESIN 600 MG: 600 TABLET, EXTENDED RELEASE ORAL at 21:11

## 2024-01-21 RX ADMIN — METHYLPREDNISOLONE SODIUM SUCCINATE 40 MG: 40 INJECTION, POWDER, FOR SOLUTION INTRAMUSCULAR; INTRAVENOUS at 05:57

## 2024-01-21 ASSESSMENT — COGNITIVE AND FUNCTIONAL STATUS - GENERAL
MOVING FROM LYING ON BACK TO SITTING ON SIDE OF FLAT BED WITH BEDRAILS: A LITTLE
MOVING FROM LYING ON BACK TO SITTING ON SIDE OF FLAT BED WITH BEDRAILS: A LITTLE
DAILY ACTIVITIY SCORE: 24
DAILY ACTIVITIY SCORE: 24
MOBILITY SCORE: 22
CLIMB 3 TO 5 STEPS WITH RAILING: A LITTLE
CLIMB 3 TO 5 STEPS WITH RAILING: A LITTLE
MOBILITY SCORE: 22

## 2024-01-21 ASSESSMENT — PAIN - FUNCTIONAL ASSESSMENT
PAIN_FUNCTIONAL_ASSESSMENT: 0-10

## 2024-01-21 ASSESSMENT — PAIN SCALES - GENERAL
PAINLEVEL_OUTOF10: 0 - NO PAIN
PAINLEVEL_OUTOF10: 0 - NO PAIN
PAINLEVEL_OUTOF10: 1
PAINLEVEL_OUTOF10: 0 - NO PAIN
PAINLEVEL_OUTOF10: 0 - NO PAIN

## 2024-01-21 NOTE — PROGRESS NOTES
"Yesy Ayala is a 68 y.o. female on day 5 of admission presenting with COPD exacerbation (CMS/HCC).    Subjective   Feels \"about the same\".  Denies shortness of breath or pain, but does admit to dyspnea on exertion and to a nonproductive cough.  On 3 L nasal cannula oxygen.  CT scan of the chest yesterday showed probable bronchial wall thickening with mucous plugging in the lower lobes and decreased to unchanged small pulmonary nodules (unofficial report).     Objective   Physical Exam  Vitals  Blood pressure 126/78, pulse 87, temperature 37.1 °C (98.8 °F), temperature source Temporal, resp. rate 16, height 1.499 m (4' 11\"), weight 57.6 kg (127 lb), SpO2 99 %.  Intake/Output last 3 Shifts:  I/O last 3 completed shifts:  In: 6043 (104.9 mL/kg) [P.O.:1440; I.V.:4603 (79.9 mL/kg)]  Out: 2050 (35.6 mL/kg) [Urine:2050 (1 mL/kg/hr)]  Weight: 57.6 kg     General-awake, alert and in no acute distress.  Lungs-bilateral wheezes without rales or rhonchi.  Heart-tachycardic with a regular rhythm.  Abdomen-positive bowel sounds.  Extremities-no edema.  Skin-no rashes.    Scheduled medications  amLODIPine, 10 mg, oral, Daily  aspirin, 81 mg, oral, Daily  budesonide, 0.5 mg, nebulization, BID  enoxaparin, 30 mg, subcutaneous, q24h  guaiFENesin, 600 mg, oral, BID  insulin lispro, 0-10 Units, subcutaneous, TID with meals  insulin lispro, 6 Units, subcutaneous, TID with meals  insulin NPH (Isophane), 22 Units, subcutaneous, q12h  ipratropium-albuteroL, 3 mL, nebulization, TID  melatonin, 3 mg, oral, Daily  methylPREDNISolone sodium succinate (PF), 40 mg, intravenous, q12h  metoprolol tartrate, 100 mg, oral, Nightly  pantoprazole, 40 mg, oral, Daily before breakfast   Or  pantoprazole, 40 mg, intravenous, Daily before breakfast  rosuvastatin, 10 mg, oral, Nightly    Continuous medications  sodium chloride 0.9%, 100 mL/hr, Last Rate: 100 mL/hr (01/20/24 1003)    PRN medications  PRN medications: acetaminophen, albuterol, benzonatate, " dextrose 10 % in water (D10W), dextrose, docusate sodium, glucagon, hydrALAZINE, oxygen     Results for orders placed or performed during the hospital encounter of 01/15/24 (from the past 24 hour(s))   POCT GLUCOSE   Result Value Ref Range    POCT Glucose 229 (H) 74 - 99 mg/dL   POCT GLUCOSE   Result Value Ref Range    POCT Glucose 294 (H) 74 - 99 mg/dL   POCT GLUCOSE   Result Value Ref Range    POCT Glucose 498 (H) 74 - 99 mg/dL   POCT GLUCOSE   Result Value Ref Range    POCT Glucose 353 (H) 74 - 99 mg/dL   PST Top   Result Value Ref Range    Extra Tube Hold for add-ons.    POCT GLUCOSE   Result Value Ref Range    POCT Glucose 147 (H) 74 - 99 mg/dL      Assessment:  Persistent dyspnea on exertion and bronchospasm due to acute exacerbation of COPD and acute bronchitis, induced by RSV viral infection.  The patient has stable small pulmonary nodules which are most likely benign.    Recommend:  1.  Continue DuoNeb, budesonide, Solu-Medrol and Lovenox.  2.  Add Perforomist.  3.  Incentive spirometry and Acapella treatment.  4.  Wean FiO2 to keep oxygen saturation approximately 92 to 95%.  5.  Check CT scan report--pending.  6.  Follow-up with pulmonary medicine after discharge.    Arnulfo Oropeza MD

## 2024-01-21 NOTE — PROGRESS NOTES
"Yesy Ayala is a 68 y.o. female on day 5 of admission presenting with COPD exacerbation (CMS/HCC).    Subjective     Still on 3 L of nasal cannula oxygen, awaiting results of CT chest, appreciated pulmonary input       Objective     Physical Exam  Constitutional:       Appearance: Normal appearance.   HENT:      Head: Normocephalic.      Nose: Nose normal.      Mouth/Throat:      Mouth: Mucous membranes are moist.   Cardiovascular:      Rate and Rhythm: Normal rate and regular rhythm.      Pulses: Normal pulses.      Heart sounds: Normal heart sounds.   Pulmonary:      Comments: Wheezing bilaterally, improved    Abdominal:      General: Abdomen is flat. Bowel sounds are normal.      Palpations: Abdomen is soft.   Skin:     General: Skin is warm.      Capillary Refill: Capillary refill takes less than 2 seconds.   Neurological:      General: No focal deficit present.      Mental Status: She is alert.         Last Recorded Vitals  Blood pressure 126/78, pulse 87, temperature 37.1 °C (98.8 °F), temperature source Temporal, resp. rate 16, height 1.499 m (4' 11\"), weight 57.6 kg (127 lb), SpO2 99 %.  Intake/Output last 3 Shifts:  I/O last 3 completed shifts:  In: 6043 (104.9 mL/kg) [P.O.:1440; I.V.:4603 (79.9 mL/kg)]  Out: 2050 (35.6 mL/kg) [Urine:2050 (1 mL/kg/hr)]  Weight: 57.6 kg     Relevant Results  Results for orders placed or performed during the hospital encounter of 01/15/24 (from the past 24 hour(s))   POCT GLUCOSE   Result Value Ref Range    POCT Glucose 229 (H) 74 - 99 mg/dL   POCT GLUCOSE   Result Value Ref Range    POCT Glucose 294 (H) 74 - 99 mg/dL   POCT GLUCOSE   Result Value Ref Range    POCT Glucose 498 (H) 74 - 99 mg/dL   POCT GLUCOSE   Result Value Ref Range    POCT Glucose 353 (H) 74 - 99 mg/dL   PST Top   Result Value Ref Range    Extra Tube Hold for add-ons.    POCT GLUCOSE   Result Value Ref Range    POCT Glucose 147 (H) 74 - 99 mg/dL       Imaging Results  Cxr: " negative    Medications:  amLODIPine, 10 mg, oral, Daily  aspirin, 81 mg, oral, Daily  budesonide, 0.5 mg, nebulization, BID  enoxaparin, 30 mg, subcutaneous, q24h  formoterol, 20 mcg, nebulization, BID  guaiFENesin, 600 mg, oral, BID  insulin lispro, 0-10 Units, subcutaneous, TID with meals  insulin lispro, 6 Units, subcutaneous, TID with meals  insulin NPH (Isophane), 22 Units, subcutaneous, q12h  ipratropium-albuteroL, 3 mL, nebulization, TID  melatonin, 3 mg, oral, Daily  methylPREDNISolone sodium succinate (PF), 40 mg, intravenous, q12h  metoprolol tartrate, 100 mg, oral, Nightly  pantoprazole, 40 mg, oral, Daily before breakfast   Or  pantoprazole, 40 mg, intravenous, Daily before breakfast  rosuvastatin, 10 mg, oral, Nightly       PRN medications: acetaminophen, albuterol, benzonatate, dextrose 10 % in water (D10W), dextrose, docusate sodium, glucagon, hydrALAZINE, oxygen     Assessment/Plan   COPD exacerbation secondary to RSV  -Continue Pulmicort, DuoNebs,   -Currently on Solu-Medrol 40 mg IV every 12 hours, on 3 L of nasal cannula oxygen,   Appreciated pulmonary input, pending chest ct w/o contrast    2.  Diabetes  -Significant hyperglycemia likely she will need to be discharged on insulin NPH, currently at 22 units, endocrinology on board  - insulin lispro 6 units 3 times daily with meals    3.  Hyperlipidemia  -Continue rosuvastatin oral iron    4.  Acute kidney injury on CKD 3  -Baseline creatinine is around 1.3 patient bumped up to 2.27 now trending down currently 1.9,  pending in am    DVT Prophylaxis:  Lovenox subq        Renita Weaver MD  [facility]

## 2024-01-21 NOTE — PROGRESS NOTES
"Yesy Ayala is a 68 y.o. female on day 5 of admission presenting with COPD exacerbation (CMS/[facility]).    Subjective   Patient still admits to being dyspneic.  She is trying to eat a carb restricted diet.     I have reviewed histories, allergies and medications have been reviewed and there are no changes       Objective     Physical Exam  Vitals and nursing note reviewed.   Constitutional:       General: She is not in acute distress.     Appearance: Normal appearance. She is normal weight.   HENT:      Head: Normocephalic and atraumatic.      Nose: Nose normal.      Mouth/Throat:      Mouth: Mucous membranes are moist.   Eyes:      Extraocular Movements: Extraocular movements intact.   Pulmonary:      Effort: Pulmonary effort is normal.      Comments: Oxygen in situ via nasal cannula   Musculoskeletal:         General: Normal range of motion.   Skin:     General: Skin is warm.   Neurological:      Mental Status: She is alert and oriented to person, place, and time.   Psychiatric:         Mood and Affect: Mood normal.         Last Recorded Vitals  Blood pressure 126/78, pulse 87, temperature 37.1 °C (98.8 °F), temperature source Temporal, resp. rate 16, height 1.499 m (4' 11\"), weight 57.6 kg (127 lb), SpO2 99 %.  Intake/Output last 3 Shifts:  I/O last 3 completed shifts:  In: 6043 (104.9 mL/kg) [P.O.:1440; I.V.:4603 (79.9 mL/kg)]  Out: 2050 (35.6 mL/kg) [Urine:2050 (1 mL/kg/hr)]  Weight: 57.6 kg     Relevant Results  Results from last 7 days   Lab Units 01/21/24  0836 01/20/24  2215 01/20/24 2017 01/20/24  1550 01/20/24  1234 01/20/24  0802 01/19/24  0734 01/19/24  0717 01/18/24  0852 01/18/24  0506 01/17/24  0825 01/17/24  0405 01/16/24  0551 01/16/24  0440   POCT GLUCOSE mg/dL 147* 353* 498* 294* 229*  --    < >  --    < >  --    < >  --    < >  --    GLUCOSE mg/dL  --   --   --   --   --  210*  --  444*  --  82  --  176*  --  663*    < > = values in this interval not displayed.     Scheduled medications  amLODIPine, " 10 mg, oral, Daily  aspirin, 81 mg, oral, Daily  budesonide, 0.5 mg, nebulization, BID  enoxaparin, 30 mg, subcutaneous, q24h  formoterol, 20 mcg, nebulization, BID  guaiFENesin, 600 mg, oral, BID  insulin lispro, 0-10 Units, subcutaneous, TID with meals  insulin lispro, 6 Units, subcutaneous, TID with meals  insulin NPH (Isophane), 22 Units, subcutaneous, q12h  ipratropium-albuteroL, 3 mL, nebulization, TID  melatonin, 3 mg, oral, Daily  methylPREDNISolone sodium succinate (PF), 40 mg, intravenous, q12h  metoprolol tartrate, 100 mg, oral, Nightly  pantoprazole, 40 mg, oral, Daily before breakfast   Or  pantoprazole, 40 mg, intravenous, Daily before breakfast  rosuvastatin, 10 mg, oral, Nightly      Continuous medications  sodium chloride 0.9%, 100 mL/hr, Last Rate: 100 mL/hr (01/20/24 1003)      PRN medications  PRN medications: acetaminophen, albuterol, benzonatate, dextrose 10 % in water (D10W), dextrose, docusate sodium, glucagon, hydrALAZINE, oxygen    Results for orders placed or performed during the hospital encounter of 01/15/24 (from the past 24 hour(s))   POCT GLUCOSE   Result Value Ref Range    POCT Glucose 229 (H) 74 - 99 mg/dL   POCT GLUCOSE   Result Value Ref Range    POCT Glucose 294 (H) 74 - 99 mg/dL   POCT GLUCOSE   Result Value Ref Range    POCT Glucose 498 (H) 74 - 99 mg/dL   POCT GLUCOSE   Result Value Ref Range    POCT Glucose 353 (H) 74 - 99 mg/dL   PST Top   Result Value Ref Range    Extra Tube Hold for add-ons.    POCT GLUCOSE   Result Value Ref Range    POCT Glucose 147 (H) 74 - 99 mg/dL      ECG 12 lead    Result Date: 1/16/2024  See ED provider note for full interpretation and clinical correlation Confirmed by Aminata Farmer ([id number]) on 1/16/2024 4:31:32 PM    XR chest 2 views    Result Date: 1/15/2024  STUDY: Chest Radiographs;  01/15/2024 INDICATION: Shortness of breath. COMPARISON: 09/09/2019 XR chest ACCESSION NUMBER(S): [id number] ORDERING CLINICIAN: TECHNIQUE:  Frontal and  lateral chest. FINDINGS: CARDIOMEDIASTINAL SILHOUETTE: Cardiomediastinal silhouette is normal in size and configuration.  LUNGS: Lungs are clear.  ABDOMEN: No remarkable upper abdominal findings.  BONES: No acute osseous changes.    No acute pulmonary abnormality. Signed by Ceasar Davies MD        Assessment/Plan   Principal Problem:    COPD exacerbation (CMS/[facility])  Active Problems:    COPD (chronic obstructive pulmonary disease) (CMS/[facility])    Type 2 diabetes mellitus, without long-term current use of insulin (CMS/[facility])    Hyperlipidemia    Hypertension    Peripheral vascular disease (CMS/[facility])    RSV (acute bronchiolitis due to respiratory syncytial virus)    Stage 3a chronic kidney disease (CMS/[facility])    IMPRESSION  TYPE 2 DIABETES MELLITUS WITH HYPERGLYCEMIA  Patient presented with hyperglycemia, further exacerbated by steroid  A1c 9.3% as of 2 months ago indicates chronic hyperglycemia  Solumedrol decreased to 40mg IV twice daily today         RECOMMENDATIONS  To continue NPH 22 units subcutaneous q12 hours  To continue  insulin lispro to 6 units QAC   To continue insulin correction scale TID AC   Diabetic diet as tolerated   Accu-Cheks ACHS    Hypoglycemic protocol   Will continue to follow       Kae Calvo MD

## 2024-01-22 ENCOUNTER — APPOINTMENT (OUTPATIENT)
Dept: VASCULAR MEDICINE | Facility: HOSPITAL | Age: 69
End: 2024-01-22
Payer: COMMERCIAL

## 2024-01-22 LAB
ANION GAP SERPL CALC-SCNC: 10 MMOL/L (ref 10–20)
BUN SERPL-MCNC: 29 MG/DL (ref 6–23)
CALCIUM SERPL-MCNC: 8.3 MG/DL (ref 8.6–10.3)
CHLORIDE SERPL-SCNC: 114 MMOL/L (ref 98–107)
CO2 SERPL-SCNC: 23 MMOL/L (ref 21–32)
CREAT SERPL-MCNC: 1.46 MG/DL (ref 0.5–1.05)
EGFRCR SERPLBLD CKD-EPI 2021: 39 ML/MIN/1.73M*2
ERYTHROCYTE [DISTWIDTH] IN BLOOD BY AUTOMATED COUNT: 19.2 % (ref 11.5–14.5)
GLUCOSE BLD MANUAL STRIP-MCNC: 146 MG/DL (ref 74–99)
GLUCOSE BLD MANUAL STRIP-MCNC: 183 MG/DL (ref 74–99)
GLUCOSE BLD MANUAL STRIP-MCNC: 196 MG/DL (ref 74–99)
GLUCOSE BLD MANUAL STRIP-MCNC: 240 MG/DL (ref 74–99)
GLUCOSE BLD MANUAL STRIP-MCNC: 299 MG/DL (ref 74–99)
GLUCOSE SERPL-MCNC: 86 MG/DL (ref 74–99)
HCT VFR BLD AUTO: 41.4 % (ref 36–46)
HGB BLD-MCNC: 13.3 G/DL (ref 12–16)
HOLD SPECIMEN: NORMAL
MCH RBC QN AUTO: 20.8 PG (ref 26–34)
MCHC RBC AUTO-ENTMCNC: 32.1 G/DL (ref 32–36)
MCV RBC AUTO: 65 FL (ref 80–100)
NRBC BLD-RTO: 0 /100 WBCS (ref 0–0)
PLATELET # BLD AUTO: 95 X10*3/UL (ref 150–450)
POTASSIUM SERPL-SCNC: 4.2 MMOL/L (ref 3.5–5.3)
RBC # BLD AUTO: 6.4 X10*6/UL (ref 4–5.2)
SODIUM SERPL-SCNC: 143 MMOL/L (ref 136–145)
WBC # BLD AUTO: 13.7 X10*3/UL (ref 4.4–11.3)

## 2024-01-22 PROCEDURE — 93970 EXTREMITY STUDY: CPT | Performed by: INTERNAL MEDICINE

## 2024-01-22 PROCEDURE — 2500000001 HC RX 250 WO HCPCS SELF ADMINISTERED DRUGS (ALT 637 FOR MEDICARE OP): Performed by: PHYSICIAN ASSISTANT

## 2024-01-22 PROCEDURE — 2500000002 HC RX 250 W HCPCS SELF ADMINISTERED DRUGS (ALT 637 FOR MEDICARE OP, ALT 636 FOR OP/ED): Performed by: INTERNAL MEDICINE

## 2024-01-22 PROCEDURE — 2500000004 HC RX 250 GENERAL PHARMACY W/ HCPCS (ALT 636 FOR OP/ED): Performed by: INTERNAL MEDICINE

## 2024-01-22 PROCEDURE — 2500000001 HC RX 250 WO HCPCS SELF ADMINISTERED DRUGS (ALT 637 FOR MEDICARE OP): Performed by: INTERNAL MEDICINE

## 2024-01-22 PROCEDURE — 82947 ASSAY GLUCOSE BLOOD QUANT: CPT

## 2024-01-22 PROCEDURE — 99233 SBSQ HOSP IP/OBS HIGH 50: CPT | Performed by: INTERNAL MEDICINE

## 2024-01-22 PROCEDURE — C9113 INJ PANTOPRAZOLE SODIUM, VIA: HCPCS | Performed by: PHYSICIAN ASSISTANT

## 2024-01-22 PROCEDURE — 2500000001 HC RX 250 WO HCPCS SELF ADMINISTERED DRUGS (ALT 637 FOR MEDICARE OP): Performed by: NURSE PRACTITIONER

## 2024-01-22 PROCEDURE — 94640 AIRWAY INHALATION TREATMENT: CPT

## 2024-01-22 PROCEDURE — 99231 SBSQ HOSP IP/OBS SF/LOW 25: CPT | Performed by: INTERNAL MEDICINE

## 2024-01-22 PROCEDURE — 80048 BASIC METABOLIC PNL TOTAL CA: CPT | Performed by: INTERNAL MEDICINE

## 2024-01-22 PROCEDURE — 2500000004 HC RX 250 GENERAL PHARMACY W/ HCPCS (ALT 636 FOR OP/ED): Performed by: PHYSICIAN ASSISTANT

## 2024-01-22 PROCEDURE — 2500000002 HC RX 250 W HCPCS SELF ADMINISTERED DRUGS (ALT 637 FOR MEDICARE OP, ALT 636 FOR OP/ED): Performed by: PHYSICIAN ASSISTANT

## 2024-01-22 PROCEDURE — 2500000001 HC RX 250 WO HCPCS SELF ADMINISTERED DRUGS (ALT 637 FOR MEDICARE OP): Performed by: STUDENT IN AN ORGANIZED HEALTH CARE EDUCATION/TRAINING PROGRAM

## 2024-01-22 PROCEDURE — 1200000002 HC GENERAL ROOM WITH TELEMETRY DAILY

## 2024-01-22 PROCEDURE — 36415 COLL VENOUS BLD VENIPUNCTURE: CPT | Performed by: INTERNAL MEDICINE

## 2024-01-22 PROCEDURE — 85027 COMPLETE CBC AUTOMATED: CPT | Performed by: INTERNAL MEDICINE

## 2024-01-22 PROCEDURE — 93970 EXTREMITY STUDY: CPT

## 2024-01-22 RX ORDER — AZITHROMYCIN 250 MG/1
250 TABLET, FILM COATED ORAL
Status: DISCONTINUED | OUTPATIENT
Start: 2024-01-22 | End: 2024-01-22

## 2024-01-22 RX ORDER — PREDNISONE 20 MG/1
40 TABLET ORAL DAILY
Status: DISCONTINUED | OUTPATIENT
Start: 2024-01-23 | End: 2024-01-25 | Stop reason: HOSPADM

## 2024-01-22 RX ORDER — AZITHROMYCIN 500 MG/1
500 TABLET, FILM COATED ORAL
Status: COMPLETED | OUTPATIENT
Start: 2024-01-22 | End: 2024-01-24

## 2024-01-22 RX ORDER — INSULIN LISPRO 100 [IU]/ML
10 INJECTION, SOLUTION INTRAVENOUS; SUBCUTANEOUS
Status: DISCONTINUED | OUTPATIENT
Start: 2024-01-22 | End: 2024-01-24

## 2024-01-22 RX ADMIN — IPRATROPIUM BROMIDE AND ALBUTEROL SULFATE 3 ML: 2.5; .5 SOLUTION RESPIRATORY (INHALATION) at 18:57

## 2024-01-22 RX ADMIN — GUAIFENESIN 600 MG: 600 TABLET, EXTENDED RELEASE ORAL at 20:36

## 2024-01-22 RX ADMIN — FORMOTEROL FUMARATE DIHYDRATE 20 MCG: 20 SOLUTION RESPIRATORY (INHALATION) at 08:14

## 2024-01-22 RX ADMIN — METHYLPREDNISOLONE SODIUM SUCCINATE 40 MG: 40 INJECTION, POWDER, FOR SOLUTION INTRAMUSCULAR; INTRAVENOUS at 06:47

## 2024-01-22 RX ADMIN — INSULIN LISPRO 10 UNITS: 100 INJECTION, SOLUTION INTRAVENOUS; SUBCUTANEOUS at 16:25

## 2024-01-22 RX ADMIN — FORMOTEROL FUMARATE DIHYDRATE 20 MCG: 20 SOLUTION RESPIRATORY (INHALATION) at 18:56

## 2024-01-22 RX ADMIN — PANTOPRAZOLE SODIUM 40 MG: 40 INJECTION, POWDER, FOR SOLUTION INTRAVENOUS at 06:47

## 2024-01-22 RX ADMIN — ROSUVASTATIN 10 MG: 10 TABLET, FILM COATED ORAL at 20:36

## 2024-01-22 RX ADMIN — IPRATROPIUM BROMIDE AND ALBUTEROL SULFATE 3 ML: 2.5; .5 SOLUTION RESPIRATORY (INHALATION) at 15:51

## 2024-01-22 RX ADMIN — INSULIN LISPRO 2 UNITS: 100 INJECTION, SOLUTION INTRAVENOUS; SUBCUTANEOUS at 16:28

## 2024-01-22 RX ADMIN — Medication 3 MG: at 20:36

## 2024-01-22 RX ADMIN — GUAIFENESIN 600 MG: 600 TABLET, EXTENDED RELEASE ORAL at 08:20

## 2024-01-22 RX ADMIN — SODIUM CHLORIDE 100 ML/HR: 9 INJECTION, SOLUTION INTRAVENOUS at 01:58

## 2024-01-22 RX ADMIN — DICLOFENAC SODIUM TOPICAL GEL, 1% 1 APPLICATION: 10 GEL TOPICAL at 08:21

## 2024-01-22 RX ADMIN — AMLODIPINE BESYLATE 10 MG: 10 TABLET ORAL at 08:20

## 2024-01-22 RX ADMIN — AZITHROMYCIN 500 MG: 500 TABLET, FILM COATED ORAL at 08:52

## 2024-01-22 RX ADMIN — INSULIN HUMAN 22 UNITS: 100 INJECTION, SUSPENSION SUBCUTANEOUS at 09:00

## 2024-01-22 RX ADMIN — BUDESONIDE 0.5 MG: 0.5 INHALANT ORAL at 08:14

## 2024-01-22 RX ADMIN — ENOXAPARIN SODIUM 30 MG: 30 INJECTION SUBCUTANEOUS at 21:45

## 2024-01-22 RX ADMIN — INSULIN LISPRO 10 UNITS: 100 INJECTION, SOLUTION INTRAVENOUS; SUBCUTANEOUS at 08:21

## 2024-01-22 RX ADMIN — ASPIRIN 81 MG: 81 TABLET, COATED ORAL at 08:20

## 2024-01-22 RX ADMIN — INSULIN LISPRO 10 UNITS: 100 INJECTION, SOLUTION INTRAVENOUS; SUBCUTANEOUS at 11:06

## 2024-01-22 RX ADMIN — BUDESONIDE 0.5 MG: 0.5 INHALANT ORAL at 18:57

## 2024-01-22 RX ADMIN — IPRATROPIUM BROMIDE AND ALBUTEROL SULFATE 3 ML: 2.5; .5 SOLUTION RESPIRATORY (INHALATION) at 08:14

## 2024-01-22 RX ADMIN — METOPROLOL TARTRATE 100 MG: 50 TABLET, FILM COATED ORAL at 20:36

## 2024-01-22 RX ADMIN — INSULIN HUMAN 22 UNITS: 100 INJECTION, SUSPENSION SUBCUTANEOUS at 20:36

## 2024-01-22 ASSESSMENT — COGNITIVE AND FUNCTIONAL STATUS - GENERAL
CLIMB 3 TO 5 STEPS WITH RAILING: A LITTLE
CLIMB 3 TO 5 STEPS WITH RAILING: A LITTLE
DAILY ACTIVITIY SCORE: 24
MOBILITY SCORE: 22
DAILY ACTIVITIY SCORE: 24
MOBILITY SCORE: 22
MOVING FROM LYING ON BACK TO SITTING ON SIDE OF FLAT BED WITH BEDRAILS: A LITTLE
MOVING FROM LYING ON BACK TO SITTING ON SIDE OF FLAT BED WITH BEDRAILS: A LITTLE

## 2024-01-22 ASSESSMENT — PAIN - FUNCTIONAL ASSESSMENT
PAIN_FUNCTIONAL_ASSESSMENT: 0-10

## 2024-01-22 ASSESSMENT — PAIN SCALES - GENERAL
PAINLEVEL_OUTOF10: 0 - NO PAIN

## 2024-01-22 NOTE — CARE PLAN
The patient's goals for the shift include Pt. will have a safe, restful and uneventful evening    The clinical goals for the shift include Pt. will have adequate rest this shift    Over the shift, the patient did not make progress toward the following goals.

## 2024-01-22 NOTE — PROGRESS NOTES
"Yesy Ayala is a 68 y.o. female on day 6 of admission presenting with COPD exacerbation (CMS/HCC).    Subjective   The patient states that she is \"hanging in there\".  She admits to shortness of breath, dyspnea on exertion, a nonproductive cough and to some left inframammary chest pain.  On 3 L nasal cannula oxygen.  CT scan of the chest showed resolution or stability of tiny bilateral pulmonary nodules, bilateral lower lobe bronchial wall thickening and lower lobe mucous plugging.     Objective   Physical Exam  Vitals  Blood pressure 134/57, pulse 65, temperature 36.4 °C (97.5 °F), temperature source Oral, resp. rate 18, height 1.499 m (4' 11\"), weight 57.6 kg (127 lb), SpO2 95 %.  Intake/Output last 3 Shifts:  I/O last 3 completed shifts:  In: 4320 (75 mL/kg) [P.O.:1200; I.V.:3120 (54.2 mL/kg)]  Out: 1700 (29.5 mL/kg) [Urine:1700 (0.8 mL/kg/hr)]  Weight: 57.6 kg     General-awake, alert and in no acute distress except for an occasional cough.  Lungs-faint wheezes bilaterally without rales or rhonchi.  Heart-regular rate and rhythm.  Abdomen-positive bowel sounds.  Extremities-no edema.  Skin-no rashes.    Scheduled medications  amLODIPine, 10 mg, oral, Daily  aspirin, 81 mg, oral, Daily  azithromycin, 500 mg, oral, q24h SONIDO  budesonide, 0.5 mg, nebulization, BID  diclofenac sodium, 4 g, Topical, BID  enoxaparin, 30 mg, subcutaneous, q24h  formoterol, 20 mcg, nebulization, BID  guaiFENesin, 600 mg, oral, BID  insulin lispro, 0-10 Units, subcutaneous, TID with meals  insulin lispro, 10 Units, subcutaneous, TID with meals  insulin NPH (Isophane), 22 Units, subcutaneous, q12h  ipratropium-albuteroL, 3 mL, nebulization, TID  melatonin, 3 mg, oral, Daily  methylPREDNISolone sodium succinate (PF), 40 mg, intravenous, q12h  metoprolol tartrate, 100 mg, oral, Nightly  pantoprazole, 40 mg, oral, Daily before breakfast   Or  pantoprazole, 40 mg, intravenous, Daily before breakfast  rosuvastatin, 10 mg, oral, " Nightly    Continuous medications     PRN medications  PRN medications: acetaminophen, albuterol, benzonatate, dextrose 10 % in water (D10W), dextrose, docusate sodium, glucagon, hydrALAZINE, oxygen     Results for orders placed or performed during the hospital encounter of 01/15/24 (from the past 24 hour(s))   Basic metabolic panel   Result Value Ref Range    Glucose 259 (H) 74 - 99 mg/dL    Sodium 139 136 - 145 mmol/L    Potassium 4.3 3.5 - 5.3 mmol/L    Chloride 111 (H) 98 - 107 mmol/L    Bicarbonate 22 21 - 32 mmol/L    Anion Gap 10 10 - 20 mmol/L    Urea Nitrogen 31 (H) 6 - 23 mg/dL    Creatinine 1.82 (H) 0.50 - 1.05 mg/dL    eGFR 30 (L) >60 mL/min/1.73m*2    Calcium 8.3 (L) 8.6 - 10.3 mg/dL   CBC   Result Value Ref Range    WBC 14.3 (H) 4.4 - 11.3 x10*3/uL    nRBC 0.0 0.0 - 0.0 /100 WBCs    RBC 6.59 (H) 4.00 - 5.20 x10*6/uL    Hemoglobin 13.7 12.0 - 16.0 g/dL    Hematocrit 43.0 36.0 - 46.0 %    MCV 65 (L) 80 - 100 fL    MCH 20.8 (L) 26.0 - 34.0 pg    MCHC 31.9 (L) 32.0 - 36.0 g/dL    RDW 19.6 (H) 11.5 - 14.5 %    Platelets 92 (L) 150 - 450 x10*3/uL   POCT GLUCOSE   Result Value Ref Range    POCT Glucose 239 (H) 74 - 99 mg/dL   POCT GLUCOSE   Result Value Ref Range    POCT Glucose 325 (H) 74 - 99 mg/dL   POCT GLUCOSE   Result Value Ref Range    POCT Glucose 293 (H) 74 - 99 mg/dL   CBC   Result Value Ref Range    WBC 13.7 (H) 4.4 - 11.3 x10*3/uL    nRBC 0.0 0.0 - 0.0 /100 WBCs    RBC 6.40 (H) 4.00 - 5.20 x10*6/uL    Hemoglobin 13.3 12.0 - 16.0 g/dL    Hematocrit 41.4 36.0 - 46.0 %    MCV 65 (L) 80 - 100 fL    MCH 20.8 (L) 26.0 - 34.0 pg    MCHC 32.1 32.0 - 36.0 g/dL    RDW 19.2 (H) 11.5 - 14.5 %    Platelets 95 (L) 150 - 450 x10*3/uL   Basic metabolic panel   Result Value Ref Range    Glucose 86 74 - 99 mg/dL    Sodium 143 136 - 145 mmol/L    Potassium 4.2 3.5 - 5.3 mmol/L    Chloride 114 (H) 98 - 107 mmol/L    Bicarbonate 23 21 - 32 mmol/L    Anion Gap 10 10 - 20 mmol/L    Urea Nitrogen 29 (H) 6 - 23 mg/dL     Creatinine 1.46 (H) 0.50 - 1.05 mg/dL    eGFR 39 (L) >60 mL/min/1.73m*2    Calcium 8.3 (L) 8.6 - 10.3 mg/dL      Assessment:  Slowly resolving COPD exacerbation due to RSV infection and acute exacerbation of chronic bronchitis.  There is no evidence of viral pneumonia, congestive heart failure, interstitial lung disease nor pneumothorax.  Pulmonary embolism is unlikely.  She has persistent mild bronchospasm.    Recommend:  1.  Continue DuoNeb, formoterol, budesonide, Solu-Medrol, Zithromax and Lovenox.  2.  Incentive spirometry and Acapella treatment.  3.  Will wean FiO2 to keep oxygen saturation approximately 92 to 95%.  4.  Follow-up with pulmonary medicine after discharge.    Arnulfo Oropeza MD

## 2024-01-22 NOTE — CARE PLAN
The patient's goals for the shift include  decreased sob    The clinical goals for the shift include hemodynamically stable      Problem: Discharge Planning  Goal: Discharge to home or other facility with appropriate resources  Outcome: Progressing     Problem: Diabetes  Goal: Achieve decreasing blood glucose levels by end of shift  Outcome: Progressing     Problem: Respiratory  Goal: Minimal/no exertional discomfort or dyspnea this shift  Outcome: Progressing     Problem: Respiratory  Goal: No signs of respiratory distress (eg. Use of accessory muscles. Peds grunting)  Outcome: Progressing

## 2024-01-22 NOTE — PROGRESS NOTES
"Yesy Ayala is a 68 y.o. female on day 6 of admission presenting with COPD exacerbation (CMS/HCC).    Subjective   On 3 L of nasal cannula oxygen, creatinine down to 1.46, DC IV fluids.  Started the patient on azithromycin appreciated CT chest results.       Objective     Physical Exam  Constitutional:       Appearance: Normal appearance.   HENT:      Head: Normocephalic.      Nose: Nose normal.      Mouth/Throat:      Mouth: Mucous membranes are moist.   Cardiovascular:      Rate and Rhythm: Normal rate and regular rhythm.      Pulses: Normal pulses.      Heart sounds: Normal heart sounds.   Pulmonary:      Comments: Wheezing bilaterally, improved    Abdominal:      General: Abdomen is flat. Bowel sounds are normal.      Palpations: Abdomen is soft.   Skin:     General: Skin is warm.      Capillary Refill: Capillary refill takes less than 2 seconds.   Neurological:      General: No focal deficit present.      Mental Status: She is alert.         Last Recorded Vitals  Blood pressure 133/76, pulse 69, temperature 36.3 °C (97.4 °F), temperature source Oral, resp. rate 18, height 1.499 m (4' 11\"), weight 57.6 kg (127 lb), SpO2 90 %.  Intake/Output last 3 Shifts:  I/O last 3 completed shifts:  In: 4320 (75 mL/kg) [P.O.:1200; I.V.:3120 (54.2 mL/kg)]  Out: 1700 (29.5 mL/kg) [Urine:1700 (0.8 mL/kg/hr)]  Weight: 57.6 kg     Relevant Results  Results for orders placed or performed during the hospital encounter of 01/15/24 (from the past 24 hour(s))   POCT GLUCOSE   Result Value Ref Range    POCT Glucose 325 (H) 74 - 99 mg/dL   POCT GLUCOSE   Result Value Ref Range    POCT Glucose 293 (H) 74 - 99 mg/dL   SST TOP   Result Value Ref Range    Extra Tube Hold for add-ons.    CBC   Result Value Ref Range    WBC 13.7 (H) 4.4 - 11.3 x10*3/uL    nRBC 0.0 0.0 - 0.0 /100 WBCs    RBC 6.40 (H) 4.00 - 5.20 x10*6/uL    Hemoglobin 13.3 12.0 - 16.0 g/dL    Hematocrit 41.4 36.0 - 46.0 %    MCV 65 (L) 80 - 100 fL    MCH 20.8 (L) 26.0 - 34.0 pg "    MCHC 32.1 32.0 - 36.0 g/dL    RDW 19.2 (H) 11.5 - 14.5 %    Platelets 95 (L) 150 - 450 x10*3/uL   Basic metabolic panel   Result Value Ref Range    Glucose 86 74 - 99 mg/dL    Sodium 143 136 - 145 mmol/L    Potassium 4.2 3.5 - 5.3 mmol/L    Chloride 114 (H) 98 - 107 mmol/L    Bicarbonate 23 21 - 32 mmol/L    Anion Gap 10 10 - 20 mmol/L    Urea Nitrogen 29 (H) 6 - 23 mg/dL    Creatinine 1.46 (H) 0.50 - 1.05 mg/dL    eGFR 39 (L) >60 mL/min/1.73m*2    Calcium 8.3 (L) 8.6 - 10.3 mg/dL   POCT GLUCOSE   Result Value Ref Range    POCT Glucose 146 (H) 74 - 99 mg/dL       Imaging Results  Cxr: negative    Chest ct:  IMPRESSION:  1.  Stable right lung micro nodules as described.  2. Bronchitis with peripheral mucous plugs in the lower lungs.    Medications:  amLODIPine, 10 mg, oral, Daily  aspirin, 81 mg, oral, Daily  azithromycin, 500 mg, oral, q24h SONIDO  budesonide, 0.5 mg, nebulization, BID  diclofenac sodium, 4 g, Topical, BID  enoxaparin, 30 mg, subcutaneous, q24h  formoterol, 20 mcg, nebulization, BID  guaiFENesin, 600 mg, oral, BID  insulin lispro, 0-10 Units, subcutaneous, TID with meals  insulin lispro, 10 Units, subcutaneous, TID with meals  insulin NPH (Isophane), 22 Units, subcutaneous, q12h  ipratropium-albuteroL, 3 mL, nebulization, TID  melatonin, 3 mg, oral, Daily  methylPREDNISolone sodium succinate (PF), 40 mg, intravenous, q12h  metoprolol tartrate, 100 mg, oral, Nightly  pantoprazole, 40 mg, oral, Daily before breakfast   Or  pantoprazole, 40 mg, intravenous, Daily before breakfast  rosuvastatin, 10 mg, oral, Nightly       PRN medications: acetaminophen, albuterol, benzonatate, dextrose 10 % in water (D10W), dextrose, docusate sodium, glucagon, hydrALAZINE, oxygen     Assessment/Plan   COPD exacerbation secondary to RSV  -Continue Pulmicort, Magaly,   -Currently on Solu-Medrol 40 mg IV every 12 hours transition to oral prednisone in the morning, on 3 L of nasal cannula oxygen,   Appreciated pulmonary  input, added on oral Zithromycin for 3 doses chest CT reviewed showed bronchitis with lower lobe mucous plugging    2.  Diabetes  -Significant hyperglycemia likely she will need to be discharged on insulin NPH, currently at 22 units, endocrinology on board  - insulin lispro 10 units 3 times daily with meals    3.  Hyperlipidemia  -Continue rosuvastatin oral iron    4.  Acute kidney injury on CKD 3  -Baseline creatinine is around 1.3 patient bumped up to 2.27 not back to baseline of 1.46 discontinue IV fluids    Disposition: Home in the morning will need a home oxygen eval    DVT Prophylaxis:  Lovenox subq        Renita Weaver MD  [facility]

## 2024-01-22 NOTE — PROGRESS NOTES
Patient has RSV and is currently still requiring supplemental O2 (.3L).  Continuing to monitor blood sugar-last reading was 146 at 9:59 am today.  Plan remains for patient to return home upon discharge.  TCC will continue to follow for discharge planning needs.

## 2024-01-22 NOTE — CARE PLAN
The patient's goals for the shift include Pt. will have a safe, restful and uneventful evening    The clinical goals for the shift include Pt. will have adequate rest this shift    Problem: Pain - Adult  Goal: Verbalizes/displays adequate comfort level or baseline comfort level  Outcome: Progressing     Problem: Safety - Adult  Goal: Free from fall injury  Outcome: Progressing     Problem: Discharge Planning  Goal: Discharge to home or other facility with appropriate resources  Outcome: Progressing     Problem: Chronic Conditions and Co-morbidities  Goal: Patient's chronic conditions and co-morbidity symptoms are monitored and maintained or improved  Outcome: Progressing     Problem: Diabetes  Goal: Achieve decreasing blood glucose levels by end of shift  Outcome: Progressing  Goal: Increase stability of blood glucose readings by end of shift  Outcome: Progressing  Goal: Decrease in ketones present in urine by end of shift  Outcome: Progressing  Goal: Maintain electrolyte levels within acceptable range throughout shift  Outcome: Progressing  Goal: Maintain glucose levels >70mg/dl to <250mg/dl throughout shift  Outcome: Progressing  Goal: No changes in neurological exam by end of shift  Outcome: Progressing  Goal: Learn about and adhere to nutrition recommendations by end of shift  Outcome: Progressing  Goal: Vital signs within normal range for age by end of shift  Outcome: Progressing  Goal: Increase self care and/or family involovement by end of shift  Outcome: Progressing  Goal: Receive DSME education by end of shift  Outcome: Progressing

## 2024-01-22 NOTE — PROGRESS NOTES
"Yesy Ayala is a 68 y.o. female on day 6 of admission presenting with COPD exacerbation (CMS/[facility]).    Subjective   Patient still upset about dietary restriction     Scheduled medications  amLODIPine, 10 mg, oral, Daily  aspirin, 81 mg, oral, Daily  budesonide, 0.5 mg, nebulization, BID  diclofenac sodium, 4 g, Topical, BID  enoxaparin, 30 mg, subcutaneous, q24h  formoterol, 20 mcg, nebulization, BID  guaiFENesin, 600 mg, oral, BID  insulin lispro, 0-10 Units, subcutaneous, TID with meals  insulin lispro, 6 Units, subcutaneous, TID with meals  insulin NPH (Isophane), 22 Units, subcutaneous, q12h  ipratropium-albuteroL, 3 mL, nebulization, TID  melatonin, 3 mg, oral, Daily  methylPREDNISolone sodium succinate (PF), 40 mg, intravenous, q12h  metoprolol tartrate, 100 mg, oral, Nightly  pantoprazole, 40 mg, oral, Daily before breakfast   Or  pantoprazole, 40 mg, intravenous, Daily before breakfast  rosuvastatin, 10 mg, oral, Nightly      Continuous medications  sodium chloride 0.9%, 100 mL/hr, Last Rate: 100 mL/hr (01/22/24 0515)      Objective   Physical Exam  Constitutional:       General: She is not in acute distress.  Neurological:      Mental Status: She is alert.         Last Recorded Vitals  Blood pressure 134/57, pulse 65, temperature 36.4 °C (97.5 °F), temperature source Oral, resp. rate 18, height 1.499 m (4' 11\"), weight 57.6 kg (127 lb), SpO2 95 %.  Intake/Output last 3 Shifts:  I/O last 3 completed shifts:  In: 4320 (75 mL/kg) [P.O.:1200; I.V.:3120 (54.2 mL/kg)]  Out: 1700 (29.5 mL/kg) [Urine:1700 (0.8 mL/kg/hr)]  Weight: 57.6 kg     Relevant Results  Results from last 7 days   Lab Units 01/21/24  2045 01/21/24  1706 01/21/24  1210 01/21/24  1158 01/21/24  0836 01/20/24  2215 01/20/24  1234 01/20/24  0802 01/19/24  0734 01/19/24  0717 01/18/24  0852 01/18/24  0506 01/17/24  0825 01/17/24  0405   POCT GLUCOSE mg/dL 293* 325* 239*  --  147* 353*   < >  --    < >  --    < >  --    < >  --    GLUCOSE mg/dL  --   " --   --  259*  --   --   --  210*  --  444*  --  82  --  176*    < > = values in this interval not displayed.       Assessment/Plan   Principal Problem:    COPD exacerbation (CMS/[facility])  Active Problems:    COPD (chronic obstructive pulmonary disease) (CMS/[facility])    Type 2 diabetes mellitus, without long-term current use of insulin (CMS/[facility])    Hyperlipidemia    Hypertension    Peripheral vascular disease (CMS/[facility])    RSV (acute bronchiolitis due to respiratory syncytial virus)    Stage 3a chronic kidney disease (CMS/[facility])    IMPRESSION  TYPE 2 DIABETES MELLITUS WITH HYPERGLYCEMIA  Patient presented with hyperglycemia, further exacerbated by steroid  A1c 9.3% as of 2 months ago indicates chronic hyperglycemia  More hyperglycemic with methylprednisolone, dose currently decreased  FBG improved  Postprandial hyperglycemia        RECOMMENDATIONS  NPH 22 units subcutaneous q12 hours  Increase insulin lispro to10 units QAC plus scale  Advised she will require insulin at discharge, to be determined    Juan Manuel العلي MD

## 2024-01-23 LAB
ANION GAP SERPL CALC-SCNC: 10 MMOL/L (ref 10–20)
BUN SERPL-MCNC: 30 MG/DL (ref 6–23)
CALCIUM SERPL-MCNC: 8.4 MG/DL (ref 8.6–10.3)
CHLORIDE SERPL-SCNC: 114 MMOL/L (ref 98–107)
CO2 SERPL-SCNC: 21 MMOL/L (ref 21–32)
CREAT SERPL-MCNC: 1.52 MG/DL (ref 0.5–1.05)
EGFRCR SERPLBLD CKD-EPI 2021: 37 ML/MIN/1.73M*2
ERYTHROCYTE [DISTWIDTH] IN BLOOD BY AUTOMATED COUNT: 19.2 % (ref 11.5–14.5)
GLUCOSE BLD MANUAL STRIP-MCNC: 172 MG/DL (ref 74–99)
GLUCOSE BLD MANUAL STRIP-MCNC: 250 MG/DL (ref 74–99)
GLUCOSE BLD MANUAL STRIP-MCNC: 301 MG/DL (ref 74–99)
GLUCOSE BLD MANUAL STRIP-MCNC: 76 MG/DL (ref 74–99)
GLUCOSE SERPL-MCNC: 61 MG/DL (ref 74–99)
HCT VFR BLD AUTO: 42.6 % (ref 36–46)
HGB BLD-MCNC: 13.7 G/DL (ref 12–16)
MCH RBC QN AUTO: 21 PG (ref 26–34)
MCHC RBC AUTO-ENTMCNC: 32.2 G/DL (ref 32–36)
MCV RBC AUTO: 65 FL (ref 80–100)
NRBC BLD-RTO: 0 /100 WBCS (ref 0–0)
PLATELET # BLD AUTO: 103 X10*3/UL (ref 150–450)
POTASSIUM SERPL-SCNC: 4 MMOL/L (ref 3.5–5.3)
RBC # BLD AUTO: 6.51 X10*6/UL (ref 4–5.2)
SODIUM SERPL-SCNC: 141 MMOL/L (ref 136–145)
WBC # BLD AUTO: 17.2 X10*3/UL (ref 4.4–11.3)

## 2024-01-23 PROCEDURE — 2500000002 HC RX 250 W HCPCS SELF ADMINISTERED DRUGS (ALT 637 FOR MEDICARE OP, ALT 636 FOR OP/ED): Performed by: INTERNAL MEDICINE

## 2024-01-23 PROCEDURE — 80048 BASIC METABOLIC PNL TOTAL CA: CPT | Performed by: INTERNAL MEDICINE

## 2024-01-23 PROCEDURE — 36415 COLL VENOUS BLD VENIPUNCTURE: CPT | Performed by: INTERNAL MEDICINE

## 2024-01-23 PROCEDURE — 2500000001 HC RX 250 WO HCPCS SELF ADMINISTERED DRUGS (ALT 637 FOR MEDICARE OP): Performed by: INTERNAL MEDICINE

## 2024-01-23 PROCEDURE — 85027 COMPLETE CBC AUTOMATED: CPT | Performed by: INTERNAL MEDICINE

## 2024-01-23 PROCEDURE — 2500000004 HC RX 250 GENERAL PHARMACY W/ HCPCS (ALT 636 FOR OP/ED): Performed by: INTERNAL MEDICINE

## 2024-01-23 PROCEDURE — 1200000002 HC GENERAL ROOM WITH TELEMETRY DAILY

## 2024-01-23 PROCEDURE — 82947 ASSAY GLUCOSE BLOOD QUANT: CPT

## 2024-01-23 PROCEDURE — 99233 SBSQ HOSP IP/OBS HIGH 50: CPT | Performed by: INTERNAL MEDICINE

## 2024-01-23 PROCEDURE — 94640 AIRWAY INHALATION TREATMENT: CPT

## 2024-01-23 PROCEDURE — 2500000002 HC RX 250 W HCPCS SELF ADMINISTERED DRUGS (ALT 637 FOR MEDICARE OP, ALT 636 FOR OP/ED): Performed by: PHYSICIAN ASSISTANT

## 2024-01-23 PROCEDURE — 2500000001 HC RX 250 WO HCPCS SELF ADMINISTERED DRUGS (ALT 637 FOR MEDICARE OP): Performed by: NURSE PRACTITIONER

## 2024-01-23 PROCEDURE — C9113 INJ PANTOPRAZOLE SODIUM, VIA: HCPCS | Performed by: PHYSICIAN ASSISTANT

## 2024-01-23 PROCEDURE — 2500000004 HC RX 250 GENERAL PHARMACY W/ HCPCS (ALT 636 FOR OP/ED): Performed by: PHYSICIAN ASSISTANT

## 2024-01-23 PROCEDURE — 99231 SBSQ HOSP IP/OBS SF/LOW 25: CPT | Performed by: INTERNAL MEDICINE

## 2024-01-23 PROCEDURE — 94760 N-INVAS EAR/PLS OXIMETRY 1: CPT

## 2024-01-23 PROCEDURE — 94668 MNPJ CHEST WALL SBSQ: CPT

## 2024-01-23 PROCEDURE — 2500000001 HC RX 250 WO HCPCS SELF ADMINISTERED DRUGS (ALT 637 FOR MEDICARE OP): Performed by: PHYSICIAN ASSISTANT

## 2024-01-23 RX ORDER — ISOPROPYL ALCOHOL 70 ML/100ML
1 SWAB TOPICAL 2 TIMES DAILY
Qty: 100 EACH | Refills: 0 | Status: SHIPPED | OUTPATIENT
Start: 2024-01-23 | End: 2024-02-22

## 2024-01-23 RX ORDER — AZITHROMYCIN 500 MG/1
500 TABLET, FILM COATED ORAL
Qty: 1 TABLET | Refills: 0 | Status: SHIPPED | OUTPATIENT
Start: 2024-01-24 | End: 2024-01-29 | Stop reason: HOSPADM

## 2024-01-23 RX ORDER — PREDNISONE 20 MG/1
40 TABLET ORAL DAILY
Qty: 10 TABLET | Refills: 0 | Status: SHIPPED | OUTPATIENT
Start: 2024-01-24 | End: 2024-01-29 | Stop reason: HOSPADM

## 2024-01-23 RX ORDER — AMLODIPINE BESYLATE 10 MG/1
10 TABLET ORAL DAILY
Qty: 30 TABLET | Refills: 0 | Status: SHIPPED | OUTPATIENT
Start: 2024-01-24 | End: 2024-04-04 | Stop reason: HOSPADM

## 2024-01-23 RX ADMIN — IPRATROPIUM BROMIDE AND ALBUTEROL SULFATE 3 ML: 2.5; .5 SOLUTION RESPIRATORY (INHALATION) at 20:32

## 2024-01-23 RX ADMIN — PREDNISONE 40 MG: 20 TABLET ORAL at 08:35

## 2024-01-23 RX ADMIN — AMLODIPINE BESYLATE 10 MG: 10 TABLET ORAL at 08:35

## 2024-01-23 RX ADMIN — IPRATROPIUM BROMIDE AND ALBUTEROL SULFATE 3 ML: 2.5; .5 SOLUTION RESPIRATORY (INHALATION) at 14:19

## 2024-01-23 RX ADMIN — METOPROLOL TARTRATE 100 MG: 50 TABLET, FILM COATED ORAL at 20:51

## 2024-01-23 RX ADMIN — PANTOPRAZOLE SODIUM 40 MG: 40 INJECTION, POWDER, FOR SOLUTION INTRAVENOUS at 06:57

## 2024-01-23 RX ADMIN — GUAIFENESIN 600 MG: 600 TABLET, EXTENDED RELEASE ORAL at 08:35

## 2024-01-23 RX ADMIN — INSULIN HUMAN 18 UNITS: 100 INJECTION, SUSPENSION SUBCUTANEOUS at 20:52

## 2024-01-23 RX ADMIN — IPRATROPIUM BROMIDE AND ALBUTEROL SULFATE 3 ML: 2.5; .5 SOLUTION RESPIRATORY (INHALATION) at 07:38

## 2024-01-23 RX ADMIN — INSULIN LISPRO 4 UNITS: 100 INJECTION, SOLUTION INTRAVENOUS; SUBCUTANEOUS at 17:00

## 2024-01-23 RX ADMIN — AZITHROMYCIN 500 MG: 500 TABLET, FILM COATED ORAL at 08:35

## 2024-01-23 RX ADMIN — Medication 3 MG: at 20:51

## 2024-01-23 RX ADMIN — INSULIN LISPRO 2 UNITS: 100 INJECTION, SOLUTION INTRAVENOUS; SUBCUTANEOUS at 12:15

## 2024-01-23 RX ADMIN — BUDESONIDE 0.5 MG: 0.5 INHALANT ORAL at 20:32

## 2024-01-23 RX ADMIN — ROSUVASTATIN 10 MG: 10 TABLET, FILM COATED ORAL at 20:51

## 2024-01-23 RX ADMIN — GUAIFENESIN 600 MG: 600 TABLET, EXTENDED RELEASE ORAL at 20:51

## 2024-01-23 RX ADMIN — INSULIN LISPRO 10 UNITS: 100 INJECTION, SOLUTION INTRAVENOUS; SUBCUTANEOUS at 17:00

## 2024-01-23 RX ADMIN — INSULIN LISPRO 10 UNITS: 100 INJECTION, SOLUTION INTRAVENOUS; SUBCUTANEOUS at 12:16

## 2024-01-23 RX ADMIN — ASPIRIN 81 MG: 81 TABLET, COATED ORAL at 08:35

## 2024-01-23 RX ADMIN — FORMOTEROL FUMARATE DIHYDRATE 20 MCG: 20 SOLUTION RESPIRATORY (INHALATION) at 07:38

## 2024-01-23 RX ADMIN — FORMOTEROL FUMARATE DIHYDRATE 20 MCG: 20 SOLUTION RESPIRATORY (INHALATION) at 20:32

## 2024-01-23 RX ADMIN — BUDESONIDE 0.5 MG: 0.5 INHALANT ORAL at 07:38

## 2024-01-23 ASSESSMENT — COGNITIVE AND FUNCTIONAL STATUS - GENERAL
CLIMB 3 TO 5 STEPS WITH RAILING: A LITTLE
MOBILITY SCORE: 22
DAILY ACTIVITIY SCORE: 24
MOVING FROM LYING ON BACK TO SITTING ON SIDE OF FLAT BED WITH BEDRAILS: A LITTLE

## 2024-01-23 ASSESSMENT — PAIN - FUNCTIONAL ASSESSMENT: PAIN_FUNCTIONAL_ASSESSMENT: 0-10

## 2024-01-23 ASSESSMENT — PAIN SCALES - GENERAL: PAINLEVEL_OUTOF10: 0 - NO PAIN

## 2024-01-23 NOTE — PROGRESS NOTES
Yesy Ayala is a 68 y.o. female on day 7 of admission presenting with COPD exacerbation (CMS/HCC).    Plan: spoke with patient regarding DC planning. States she is unable to return home and is very anxious. Asked her to follow up with her mother or friend to see if she is able to stay there for a short while until she's feeling better.  Disposition: Home  Barrier: none  ADOD:  today/tomorrow      1400pm  Followed up with patient and she spoke with liaison from [facility] and planning to go there for respite stay at their [place]. Updated [facility] Liaison Ellen and she has patient's information. Awaiting final DC orders and she will DC there tomorrow. Patient will need to bring all existing medications from home, and can get her new medications filled at [facility] if prescriptions are signed and faxed to them prior to DC (fax: [phone])Plan for DC tomorrow to [facility]    Sara Mckeon RN

## 2024-01-23 NOTE — PROGRESS NOTES
Received referral from [facility] about assisting pt with discharge. SW called and spoke with pt on phone. Pt states that she is not feeling well enough to discharge home and that her mom can not be around her for at least a week due to her RSV. Pt does have 2 friends that she used to work with- Mehnaz and Aleksandra but they both work second shift 3-12. Pt does not want this SW to attempt to call either friend to assist with discharge planning. Pt denies any anxiety or depression diagnosis. Will follow.

## 2024-01-23 NOTE — CARE PLAN
The patient's goals for the shift include Pt. will have a safe, restful and uneventful evening.    The clinical goals for the shift include Pt. will have no c/o shortness of breath this shift    Problem: Pain - Adult  Goal: Verbalizes/displays adequate comfort level or baseline comfort level  Outcome: Progressing     Problem: Safety - Adult  Goal: Free from fall injury  Outcome: Progressing     Problem: Discharge Planning  Goal: Discharge to home or other facility with appropriate resources  Outcome: Progressing     Problem: Chronic Conditions and Co-morbidities  Goal: Patient's chronic conditions and co-morbidity symptoms are monitored and maintained or improved  Outcome: Progressing     Problem: Diabetes  Goal: Achieve decreasing blood glucose levels by end of shift  Outcome: Progressing  Goal: Increase stability of blood glucose readings by end of shift  Outcome: Progressing  Goal: Decrease in ketones present in urine by end of shift  Outcome: Progressing  Goal: Maintain electrolyte levels within acceptable range throughout shift  Outcome: Progressing  Goal: Maintain glucose levels >70mg/dl to <250mg/dl throughout shift  Outcome: Progressing  Goal: No changes in neurological exam by end of shift  Outcome: Progressing  Goal: Learn about and adhere to nutrition recommendations by end of shift  Outcome: Progressing  Goal: Vital signs within normal range for age by end of shift  Outcome: Progressing  Goal: Increase self care and/or family involovement by end of shift  Outcome: Progressing  Goal: Receive DSME education by end of shift  Outcome: Progressing     Problem: Respiratory  Goal: Minimal/no exertional discomfort or dyspnea this shift  Outcome: Progressing  Goal: No signs of respiratory distress (eg. Use of accessory muscles. Peds grunting)  Outcome: Progressing     Problem: Fall/Injury  Goal: Not fall by end of shift  Reactivated  Goal: Be free from injury by end of the shift  Reactivated  Goal: Verbalize  understanding of personal risk factors for fall in the hospital  Reactivated  Goal: Verbalize understanding of risk factor reduction measures to prevent injury from fall in the home  Reactivated  Goal: Use assistive devices by end of the shift  Reactivated  Goal: Pace activities to prevent fatigue by end of the shift  Reactivated

## 2024-01-23 NOTE — PROGRESS NOTES
"Yesy Ayala is a 68 y.o. female on day 7 of admission presenting with COPD exacerbation (CMS/[facility]).    Subjective   Feels \"better\".  Admits to persistent dyspnea on exertion and to a nonproductive cough, but denies shortness of breath or pain.  On 2 L nasal cannula oxygen.  Lower extremity ultrasound yesterday showed no DVT bilaterally.  Change from Solu-Medrol to prednisone yesterday.     Objective   Physical Exam  Vitals  Blood pressure 107/59, pulse 74, temperature 36.9 °C (98.4 °F), resp. rate 18, height 1.499 m (4' 11\"), weight 57.6 kg (127 lb), SpO2 92 %.  Intake/Output last 3 Shifts:  I/O last 3 completed shifts:  In: 2086.7 (36.2 mL/kg) [P.O.:360; I.V.:1726.7 (30 mL/kg)]  Out: - (0 mL/kg)   Weight: 57.6 kg     General-awake, alert and in no acute distress except for an intermittent cough.  Lungs-faint bilateral wheezes without rales or rhonchi.  Heart-regular rate and rhythm.    Abdomen-positive bowel sounds.  Extremities-no edema.  Skin-no rashes.    Scheduled medications  amLODIPine, 10 mg, oral, Daily  aspirin, 81 mg, oral, Daily  azithromycin, 500 mg, oral, q24h SONIDO  budesonide, 0.5 mg, nebulization, BID  diclofenac sodium, 4 g, Topical, BID  enoxaparin, 30 mg, subcutaneous, q24h  formoterol, 20 mcg, nebulization, BID  guaiFENesin, 600 mg, oral, BID  insulin lispro, 0-10 Units, subcutaneous, TID with meals  insulin lispro, 10 Units, subcutaneous, TID with meals  insulin NPH (Isophane), 18 Units, subcutaneous, q12h  ipratropium-albuteroL, 3 mL, nebulization, TID  melatonin, 3 mg, oral, Daily  metoprolol tartrate, 100 mg, oral, Nightly  pantoprazole, 40 mg, oral, Daily before breakfast   Or  pantoprazole, 40 mg, intravenous, Daily before breakfast  predniSONE, 40 mg, oral, Daily  rosuvastatin, 10 mg, oral, Nightly    Continuous medications     PRN medications  PRN medications: acetaminophen, albuterol, benzonatate, dextrose 10 % in water (D10W), dextrose, docusate sodium, glucagon, hydrALAZINE, oxygen "     Results for orders placed or performed during the hospital encounter of 01/15/24 (from the past 24 hour(s))   POCT GLUCOSE   Result Value Ref Range    POCT Glucose 183 (H) 74 - 99 mg/dL   POCT GLUCOSE   Result Value Ref Range    POCT Glucose 196 (H) 74 - 99 mg/dL   POCT GLUCOSE   Result Value Ref Range    POCT Glucose 240 (H) 74 - 99 mg/dL   POCT GLUCOSE   Result Value Ref Range    POCT Glucose 299 (H) 74 - 99 mg/dL   CBC   Result Value Ref Range    WBC 17.2 (H) 4.4 - 11.3 x10*3/uL    nRBC 0.0 0.0 - 0.0 /100 WBCs    RBC 6.51 (H) 4.00 - 5.20 x10*6/uL    Hemoglobin 13.7 12.0 - 16.0 g/dL    Hematocrit 42.6 36.0 - 46.0 %    MCV 65 (L) 80 - 100 fL    MCH 21.0 (L) 26.0 - 34.0 pg    MCHC 32.2 32.0 - 36.0 g/dL    RDW 19.2 (H) 11.5 - 14.5 %    Platelets 103 (L) 150 - 450 x10*3/uL   Basic metabolic panel   Result Value Ref Range    Glucose 61 (L) 74 - 99 mg/dL    Sodium 141 136 - 145 mmol/L    Potassium 4.0 3.5 - 5.3 mmol/L    Chloride 114 (H) 98 - 107 mmol/L    Bicarbonate 21 21 - 32 mmol/L    Anion Gap 10 10 - 20 mmol/L    Urea Nitrogen 30 (H) 6 - 23 mg/dL    Creatinine 1.52 (H) 0.50 - 1.05 mg/dL    eGFR 37 (L) >60 mL/min/1.73m*2    Calcium 8.4 (L) 8.6 - 10.3 mg/dL   POCT GLUCOSE   Result Value Ref Range    POCT Glucose 76 74 - 99 mg/dL      Assessment:  Slowly improving COPD exacerbation and RSV infection with acute exacerbation of chronic bronchitis.  Pulmonary embolism is unlikely.  The patient has persistent, but improved, mild bronchospasm.      Recommend:  1.  Continue DuoNeb, formoterol, budesonide, prednisone, Zithromax and Lovenox.  2.  Incentive spirometry and Acapella treatment.  3.  Wean FiO2 to keep oxygen saturation approximately 92 to 95%.  4.  Follow-up with Pulmonary medicine after discharge.    Arnulfo Oropeza MD

## 2024-01-23 NOTE — PROGRESS NOTES
"Yesy Ayala is a 68 y.o. female on day 7 of admission presenting with COPD exacerbation (CMS/[facility]).    Subjective   Improving, able to get to the bathroom without dyspnea now  Steroid changed to prednisone 40 mg PO     Scheduled medications  amLODIPine, 10 mg, oral, Daily  aspirin, 81 mg, oral, Daily  azithromycin, 500 mg, oral, q24h SONIDO  budesonide, 0.5 mg, nebulization, BID  diclofenac sodium, 4 g, Topical, BID  enoxaparin, 30 mg, subcutaneous, q24h  formoterol, 20 mcg, nebulization, BID  guaiFENesin, 600 mg, oral, BID  insulin lispro, 0-10 Units, subcutaneous, TID with meals  insulin lispro, 10 Units, subcutaneous, TID with meals  insulin NPH (Isophane), 22 Units, subcutaneous, q12h  ipratropium-albuteroL, 3 mL, nebulization, TID  melatonin, 3 mg, oral, Daily  metoprolol tartrate, 100 mg, oral, Nightly  pantoprazole, 40 mg, oral, Daily before breakfast   Or  pantoprazole, 40 mg, intravenous, Daily before breakfast  predniSONE, 40 mg, oral, Daily  rosuvastatin, 10 mg, oral, Nightly        Objective   Physical Exam  Constitutional:       General: She is not in acute distress.  Neurological:      Mental Status: She is alert.         Last Recorded Vitals  Blood pressure 122/71, pulse 69, temperature 36.3 °C (97.4 °F), resp. rate 18, height 1.499 m (4' 11\"), weight 57.6 kg (127 lb), SpO2 96 %.  Intake/Output last 3 Shifts:  I/O last 3 completed shifts:  In: 2086.7 (36.2 mL/kg) [P.O.:360; I.V.:1726.7 (30 mL/kg)]  Out: - (0 mL/kg)   Weight: 57.6 kg     Relevant Results  Results from last 7 days   Lab Units 01/22/24  1952 01/22/24  1756 01/22/24  1624 01/22/24  1255 01/22/24  0959 01/22/24  0637 01/21/24  1210 01/21/24  1158 01/20/24  1234 01/20/24  0802 01/19/24  0734 01/19/24  0717 01/18/24  0852 01/18/24  0506   POCT GLUCOSE mg/dL 299* 240* 196* 183* 146*  --    < >  --    < >  --    < >  --    < >  --    GLUCOSE mg/dL  --   --   --   --   --  86  --  259*  --  210*  --  444*  --  82    < > = values in this interval " not displayed.       Assessment/Plan   Principal Problem:    COPD exacerbation (CMS/[facility])  Active Problems:    COPD (chronic obstructive pulmonary disease) (CMS/[facility])    Type 2 diabetes mellitus, without long-term current use of insulin (CMS/[facility])    Hyperlipidemia    Hypertension    Peripheral vascular disease (CMS/[facility])    RSV (acute bronchiolitis due to respiratory syncytial virus)    Stage 3a chronic kidney disease (CMS/[facility])    IMPRESSION  TYPE 2 DIABETES MELLITUS WITH HYPERGLYCEMIA  Patient presented with hyperglycemia, further exacerbated by steroid  A1c 9.3% as of 2 months ago indicates chronic hyperglycemia  FBG improved  Steroid dose decreased   Intolerance to metformin  Unable to afford Jardiance     RECOMMENDATIONS  NPH 18 units subcutaneous q12 hours  Insulin lispro to10 units QAC plus scale  Advised she will require insulin at discharge, to be determined  Favor Humalog Mix 75/25 or covered alternative, 25 units BID at present steroid level    Juan Manuel العلي MD

## 2024-01-23 NOTE — PROGRESS NOTES
"Yesy Ayala is a 68 y.o. female on day 7 of admission presenting with COPD exacerbation (CMS/HCC).    Subjective   Resting comfortably in bed on RA though nursing states that she does drop when she gets up to the bathroom. Discussed possible discharge today and she got very anxious noting that she does not have much help at home, does not feel that she will be able to get up her stairs at home, she does not have enough food in her refrigerator.        Objective     Physical Exam  Constitutional:       Appearance: Normal appearance.   HENT:      Head: Normocephalic.      Nose: Nose normal.      Mouth/Throat:      Mouth: Mucous membranes are moist.   Cardiovascular:      Rate and Rhythm: Normal rate and regular rhythm.      Pulses: Normal pulses.      Heart sounds: Normal heart sounds.   Pulmonary:      Effort: No respiratory distress.      Breath sounds: No wheezing or rales.      Comments:     Abdominal:      General: Abdomen is flat. Bowel sounds are normal.      Palpations: Abdomen is soft.   Skin:     General: Skin is warm.      Capillary Refill: Capillary refill takes less than 2 seconds.   Neurological:      General: No focal deficit present.      Mental Status: She is alert.         Last Recorded Vitals  Blood pressure 146/74, pulse 82, temperature 36.9 °C (98.5 °F), resp. rate 18, height 1.499 m (4' 11\"), weight 57.6 kg (127 lb), SpO2 97 %.  Intake/Output last 3 Shifts:  I/O last 3 completed shifts:  In: 2086.7 (36.2 mL/kg) [P.O.:360; I.V.:1726.7 (30 mL/kg)]  Out: - (0 mL/kg)   Weight: 57.6 kg     Relevant Results  Results for orders placed or performed during the hospital encounter of 01/15/24 (from the past 24 hour(s))   POCT GLUCOSE   Result Value Ref Range    POCT Glucose 196 (H) 74 - 99 mg/dL   POCT GLUCOSE   Result Value Ref Range    POCT Glucose 240 (H) 74 - 99 mg/dL   POCT GLUCOSE   Result Value Ref Range    POCT Glucose 299 (H) 74 - 99 mg/dL   CBC   Result Value Ref Range    WBC 17.2 (H) 4.4 - 11.3 " x10*3/uL    nRBC 0.0 0.0 - 0.0 /100 WBCs    RBC 6.51 (H) 4.00 - 5.20 x10*6/uL    Hemoglobin 13.7 12.0 - 16.0 g/dL    Hematocrit 42.6 36.0 - 46.0 %    MCV 65 (L) 80 - 100 fL    MCH 21.0 (L) 26.0 - 34.0 pg    MCHC 32.2 32.0 - 36.0 g/dL    RDW 19.2 (H) 11.5 - 14.5 %    Platelets 103 (L) 150 - 450 x10*3/uL   Basic metabolic panel   Result Value Ref Range    Glucose 61 (L) 74 - 99 mg/dL    Sodium 141 136 - 145 mmol/L    Potassium 4.0 3.5 - 5.3 mmol/L    Chloride 114 (H) 98 - 107 mmol/L    Bicarbonate 21 21 - 32 mmol/L    Anion Gap 10 10 - 20 mmol/L    Urea Nitrogen 30 (H) 6 - 23 mg/dL    Creatinine 1.52 (H) 0.50 - 1.05 mg/dL    eGFR 37 (L) >60 mL/min/1.73m*2    Calcium 8.4 (L) 8.6 - 10.3 mg/dL   POCT GLUCOSE   Result Value Ref Range    POCT Glucose 76 74 - 99 mg/dL   POCT GLUCOSE   Result Value Ref Range    POCT Glucose 172 (H) 74 - 99 mg/dL       Imaging Results  Cxr: negative    Chest ct:  IMPRESSION:  1.  Stable right lung micro nodules as described.  2. Bronchitis with peripheral mucous plugs in the lower lungs.    Medications:  amLODIPine, 10 mg, oral, Daily  aspirin, 81 mg, oral, Daily  azithromycin, 500 mg, oral, q24h SONIDO  budesonide, 0.5 mg, nebulization, BID  diclofenac sodium, 4 g, Topical, BID  enoxaparin, 30 mg, subcutaneous, q24h  formoterol, 20 mcg, nebulization, BID  guaiFENesin, 600 mg, oral, BID  insulin lispro, 0-10 Units, subcutaneous, TID with meals  insulin lispro, 10 Units, subcutaneous, TID with meals  insulin NPH (Isophane), 18 Units, subcutaneous, q12h  ipratropium-albuteroL, 3 mL, nebulization, TID  melatonin, 3 mg, oral, Daily  metoprolol tartrate, 100 mg, oral, Nightly  pantoprazole, 40 mg, oral, Daily before breakfast   Or  pantoprazole, 40 mg, intravenous, Daily before breakfast  predniSONE, 40 mg, oral, Daily  rosuvastatin, 10 mg, oral, Nightly       PRN medications: acetaminophen, albuterol, benzonatate, dextrose 10 % in water (D10W), dextrose, docusate sodium, glucagon, hydrALAZINE,  oxygen     Assessment/Plan   COPD exacerbation secondary to RSV  -Continue Pulmicort, DuoNebs,   -prednisone RA when lying in bed, will ask resp therapy to eval for homegoing O2 needs.   Appreciated pulmonary input, added on oral Zithromycin for 3 doses chest CT reviewed showed bronchitis with lower lobe mucous plugging    2.  Diabetes  -Significant hyperglycemia likely she will need to be discharged on insulin NPH, currently at 22 units, endocrinology on board  - insulin lispro 10 units 3 times daily with meals    3.  Hyperlipidemia  -Continue rosuvastatin oral iron    4.  Acute kidney injury on CKD 3  -Baseline creatinine is around 1.3 patient bumped up to 2.27 not back to baseline of 1.46 discontinue IV fluids    Disposition: Home in the morning will need a home oxygen eval    DVT Prophylaxis:  Lovenox subq        Lambert Carrillo, [facility]

## 2024-01-24 LAB
ANION GAP SERPL CALC-SCNC: 13 MMOL/L (ref 10–20)
BUN SERPL-MCNC: 30 MG/DL (ref 6–23)
CALCIUM SERPL-MCNC: 8.4 MG/DL (ref 8.6–10.3)
CHLORIDE SERPL-SCNC: 111 MMOL/L (ref 98–107)
CO2 SERPL-SCNC: 21 MMOL/L (ref 21–32)
CREAT SERPL-MCNC: 1.71 MG/DL (ref 0.5–1.05)
EGFRCR SERPLBLD CKD-EPI 2021: 32 ML/MIN/1.73M*2
GLUCOSE BLD MANUAL STRIP-MCNC: 124 MG/DL (ref 74–99)
GLUCOSE BLD MANUAL STRIP-MCNC: 189 MG/DL (ref 74–99)
GLUCOSE BLD MANUAL STRIP-MCNC: 245 MG/DL (ref 74–99)
GLUCOSE BLD MANUAL STRIP-MCNC: 380 MG/DL (ref 74–99)
GLUCOSE SERPL-MCNC: 131 MG/DL (ref 74–99)
HOLD SPECIMEN: NORMAL
POTASSIUM SERPL-SCNC: 4 MMOL/L (ref 3.5–5.3)
SODIUM SERPL-SCNC: 141 MMOL/L (ref 136–145)

## 2024-01-24 PROCEDURE — 2500000004 HC RX 250 GENERAL PHARMACY W/ HCPCS (ALT 636 FOR OP/ED): Performed by: INTERNAL MEDICINE

## 2024-01-24 PROCEDURE — 2500000001 HC RX 250 WO HCPCS SELF ADMINISTERED DRUGS (ALT 637 FOR MEDICARE OP): Performed by: INTERNAL MEDICINE

## 2024-01-24 PROCEDURE — 82947 ASSAY GLUCOSE BLOOD QUANT: CPT

## 2024-01-24 PROCEDURE — 94640 AIRWAY INHALATION TREATMENT: CPT

## 2024-01-24 PROCEDURE — 2500000002 HC RX 250 W HCPCS SELF ADMINISTERED DRUGS (ALT 637 FOR MEDICARE OP, ALT 636 FOR OP/ED): Performed by: INTERNAL MEDICINE

## 2024-01-24 PROCEDURE — 99239 HOSP IP/OBS DSCHRG MGMT >30: CPT | Performed by: INTERNAL MEDICINE

## 2024-01-24 PROCEDURE — 94668 MNPJ CHEST WALL SBSQ: CPT

## 2024-01-24 PROCEDURE — 1200000002 HC GENERAL ROOM WITH TELEMETRY DAILY

## 2024-01-24 PROCEDURE — 2500000001 HC RX 250 WO HCPCS SELF ADMINISTERED DRUGS (ALT 637 FOR MEDICARE OP): Performed by: NURSE PRACTITIONER

## 2024-01-24 PROCEDURE — 2500000002 HC RX 250 W HCPCS SELF ADMINISTERED DRUGS (ALT 637 FOR MEDICARE OP, ALT 636 FOR OP/ED): Performed by: PHYSICIAN ASSISTANT

## 2024-01-24 PROCEDURE — 2500000001 HC RX 250 WO HCPCS SELF ADMINISTERED DRUGS (ALT 637 FOR MEDICARE OP): Performed by: PHYSICIAN ASSISTANT

## 2024-01-24 PROCEDURE — 94761 N-INVAS EAR/PLS OXIMETRY MLT: CPT

## 2024-01-24 PROCEDURE — 99231 SBSQ HOSP IP/OBS SF/LOW 25: CPT | Performed by: INTERNAL MEDICINE

## 2024-01-24 PROCEDURE — 80048 BASIC METABOLIC PNL TOTAL CA: CPT | Performed by: INTERNAL MEDICINE

## 2024-01-24 PROCEDURE — 2500000004 HC RX 250 GENERAL PHARMACY W/ HCPCS (ALT 636 FOR OP/ED): Performed by: PHYSICIAN ASSISTANT

## 2024-01-24 PROCEDURE — 36415 COLL VENOUS BLD VENIPUNCTURE: CPT | Performed by: INTERNAL MEDICINE

## 2024-01-24 RX ORDER — INSULIN LISPRO 100 [IU]/ML
6 INJECTION, SOLUTION INTRAVENOUS; SUBCUTANEOUS
Status: DISCONTINUED | OUTPATIENT
Start: 2024-01-24 | End: 2024-01-25 | Stop reason: HOSPADM

## 2024-01-24 RX ADMIN — INSULIN HUMAN 15 UNITS: 100 INJECTION, SUSPENSION SUBCUTANEOUS at 22:27

## 2024-01-24 RX ADMIN — INSULIN LISPRO 6 UNITS: 100 INJECTION, SOLUTION INTRAVENOUS; SUBCUTANEOUS at 17:13

## 2024-01-24 RX ADMIN — ENOXAPARIN SODIUM 30 MG: 30 INJECTION SUBCUTANEOUS at 00:11

## 2024-01-24 RX ADMIN — IPRATROPIUM BROMIDE AND ALBUTEROL SULFATE 3 ML: 2.5; .5 SOLUTION RESPIRATORY (INHALATION) at 21:02

## 2024-01-24 RX ADMIN — GUAIFENESIN 600 MG: 600 TABLET, EXTENDED RELEASE ORAL at 22:26

## 2024-01-24 RX ADMIN — BENZONATATE 100 MG: 100 CAPSULE ORAL at 22:26

## 2024-01-24 RX ADMIN — METOPROLOL TARTRATE 100 MG: 50 TABLET, FILM COATED ORAL at 22:27

## 2024-01-24 RX ADMIN — AZITHROMYCIN 500 MG: 500 TABLET, FILM COATED ORAL at 09:52

## 2024-01-24 RX ADMIN — INSULIN HUMAN 15 UNITS: 100 INJECTION, SUSPENSION SUBCUTANEOUS at 09:52

## 2024-01-24 RX ADMIN — FORMOTEROL FUMARATE DIHYDRATE 20 MCG: 20 SOLUTION RESPIRATORY (INHALATION) at 21:02

## 2024-01-24 RX ADMIN — PANTOPRAZOLE SODIUM 40 MG: 40 TABLET, DELAYED RELEASE ORAL at 08:21

## 2024-01-24 RX ADMIN — PREDNISONE 40 MG: 20 TABLET ORAL at 09:52

## 2024-01-24 RX ADMIN — Medication 3 MG: at 22:26

## 2024-01-24 RX ADMIN — INSULIN LISPRO 2 UNITS: 100 INJECTION, SOLUTION INTRAVENOUS; SUBCUTANEOUS at 12:50

## 2024-01-24 RX ADMIN — INSULIN LISPRO 6 UNITS: 100 INJECTION, SOLUTION INTRAVENOUS; SUBCUTANEOUS at 12:00

## 2024-01-24 RX ADMIN — AMLODIPINE BESYLATE 10 MG: 10 TABLET ORAL at 09:52

## 2024-01-24 RX ADMIN — DICLOFENAC SODIUM TOPICAL GEL, 1% 1 APPLICATION: 10 GEL TOPICAL at 09:00

## 2024-01-24 RX ADMIN — BUDESONIDE 0.5 MG: 0.5 INHALANT ORAL at 21:02

## 2024-01-24 RX ADMIN — ENOXAPARIN SODIUM 30 MG: 30 INJECTION SUBCUTANEOUS at 22:26

## 2024-01-24 RX ADMIN — ROSUVASTATIN 10 MG: 10 TABLET, FILM COATED ORAL at 22:26

## 2024-01-24 RX ADMIN — GUAIFENESIN 600 MG: 600 TABLET, EXTENDED RELEASE ORAL at 09:52

## 2024-01-24 RX ADMIN — IPRATROPIUM BROMIDE AND ALBUTEROL SULFATE 3 ML: 2.5; .5 SOLUTION RESPIRATORY (INHALATION) at 14:40

## 2024-01-24 RX ADMIN — ASPIRIN 81 MG: 81 TABLET, COATED ORAL at 09:52

## 2024-01-24 RX ADMIN — INSULIN LISPRO 4 UNITS: 100 INJECTION, SOLUTION INTRAVENOUS; SUBCUTANEOUS at 17:05

## 2024-01-24 ASSESSMENT — COGNITIVE AND FUNCTIONAL STATUS - GENERAL
CLIMB 3 TO 5 STEPS WITH RAILING: A LITTLE
MOVING FROM LYING ON BACK TO SITTING ON SIDE OF FLAT BED WITH BEDRAILS: A LITTLE

## 2024-01-24 ASSESSMENT — PAIN SCALES - GENERAL
PAINLEVEL_OUTOF10: 0 - NO PAIN

## 2024-01-24 ASSESSMENT — PAIN - FUNCTIONAL ASSESSMENT
PAIN_FUNCTIONAL_ASSESSMENT: 0-10

## 2024-01-24 NOTE — DISCHARGE INSTRUCTIONS
Care Coordination Note:  Patient discharging to  [place]/ Respite  At [facility]  Prescriptions x 6 sent this morning to [facility]  Fax # [phone].  Also Fax attached to referral  No Home O2 needs on  01/24/2024.

## 2024-01-24 NOTE — PROGRESS NOTES
"Yesy Ayala is a 68 y.o. female on day 8 of admission presenting with COPD exacerbation (CMS/[facility]).    Subjective   States she is being discharged to a SNF today.  On 3 L nasal cannula oxygen.  Was snoring heavily when entering the room.  Admits to shortness of breath and a nonproductive cough, but denies pain.     Objective   Physical Exam  Vitals  Blood pressure 121/63, pulse 62, temperature 36.3 °C (97.4 °F), resp. rate 18, height 1.499 m (4' 11\"), weight 57.6 kg (127 lb), SpO2 99 %.  Intake/Output last 3 Shifts:  I/O last 3 completed shifts:  In: 600 (10.4 mL/kg) [P.O.:600]  Out: - (0 mL/kg)   Weight: 57.6 kg     General-awake, alert and in no acute distress except for an occasional cough.  Lungs-clear, without wheezes, rales or rhonchi.  Heart-regular rate and rhythm.  Abdomen-positive bowel sounds.  Extremities-no edema.  Skin-no rashes.    Scheduled medications  amLODIPine, 10 mg, oral, Daily  aspirin, 81 mg, oral, Daily  budesonide, 0.5 mg, nebulization, BID  diclofenac sodium, 4 g, Topical, BID  enoxaparin, 30 mg, subcutaneous, q24h  formoterol, 20 mcg, nebulization, BID  guaiFENesin, 600 mg, oral, BID  insulin lispro, 0-10 Units, subcutaneous, TID with meals  insulin lispro, 6 Units, subcutaneous, TID with meals  insulin NPH (Isophane), 15 Units, subcutaneous, q12h  ipratropium-albuteroL, 3 mL, nebulization, TID  melatonin, 3 mg, oral, Daily  metoprolol tartrate, 100 mg, oral, Nightly  pantoprazole, 40 mg, oral, Daily before breakfast   Or  pantoprazole, 40 mg, intravenous, Daily before breakfast  predniSONE, 40 mg, oral, Daily  rosuvastatin, 10 mg, oral, Nightly    Continuous medications     PRN medications  PRN medications: acetaminophen, albuterol, benzonatate, dextrose 10 % in water (D10W), dextrose, docusate sodium, glucagon, hydrALAZINE, oxygen     Results for orders placed or performed during the hospital encounter of 01/15/24 (from the past 24 hour(s))   POCT GLUCOSE   Result Value Ref Range    POCT " Glucose 172 (H) 74 - 99 mg/dL   POCT GLUCOSE   Result Value Ref Range    POCT Glucose 250 (H) 74 - 99 mg/dL   POCT GLUCOSE   Result Value Ref Range    POCT Glucose 301 (H) 74 - 99 mg/dL   Basic metabolic panel   Result Value Ref Range    Glucose 131 (H) 74 - 99 mg/dL    Sodium 141 136 - 145 mmol/L    Potassium 4.0 3.5 - 5.3 mmol/L    Chloride 111 (H) 98 - 107 mmol/L    Bicarbonate 21 21 - 32 mmol/L    Anion Gap 13 10 - 20 mmol/L    Urea Nitrogen 30 (H) 6 - 23 mg/dL    Creatinine 1.71 (H) 0.50 - 1.05 mg/dL    eGFR 32 (L) >60 mL/min/1.73m*2    Calcium 8.4 (L) 8.6 - 10.3 mg/dL   Lavender Top   Result Value Ref Range    Extra Tube Hold for add-ons.    POCT GLUCOSE   Result Value Ref Range    POCT Glucose 124 (H) 74 - 99 mg/dL      Assessment:  Resolved COPD exacerbation, RSV infection and acute-on-chronic exacerbation of chronic bronchitis.  There is no bronchospasm.  She has persistent mild hypoxia.    Recommend:  1.  Continue DuoNeb, formoterol, budesonide and Lovenox.  2.  Continue prednisone but consider prednisone taper.    3.  Incentive spirometry and Acapella treatment.  4.  Wean FiO2 to keep oxygen saturation approximately 92 to 95%; home oxygen evaluation prior to discharge.  5.  Follow-up with pulmonary medicine after discharge.  6.  Overall, the patient appears to be clinically stable for discharge from a pulmonary standpoint.    Arnulfo Oropeza MD

## 2024-01-24 NOTE — PROGRESS NOTES
01/24/2024 1113 Medically ready for discharge to [facility]/ Respite. Update / new prescriptions x 6 received from Dr. Carrillo and faxed to [facility]( fax [phone]) Call to [facility] [phone]. Voice message left with admissions informing patient will be discharge today & new scripts were sent to [facility] via fax. [facility] call back number was given.  Alondra LANE RN [facility]    01/24/2024 Prescriptions x 6 faxed to [facility] ( [phone]) @ 1101 with Success confirmation received this morning. Secure chats from Lilly RN patient requiring Home O2 evaluation. Confirmed patient is going to [facility] not [facility]. [facility] spoke with Ellen/ [facility] [phone] She informs the Prescription were not received by Pharmacy today. TCC re- faxing to confirmed number [phone]. Same as previous fax number.  Dr. Carrillo has been notified and is agreeable with plan. [facility] updated patient. [facility] will call [facility] in the morning to confirm Pharmacy has received prescriptions via fax. Patient does not require Home O2 per Dr. Carrillo.   Alondra LANE RN [facility]'

## 2024-01-24 NOTE — PROGRESS NOTES
"Yesy Ayala is a 68 y.o. female on day 8 of admission presenting with COPD exacerbation (CMS/[facility]).    Subjective   No new complaints     NPH held yesterday morning      Scheduled medications  amLODIPine, 10 mg, oral, Daily  aspirin, 81 mg, oral, Daily  azithromycin, 500 mg, oral, q24h SONIDO  budesonide, 0.5 mg, nebulization, BID  diclofenac sodium, 4 g, Topical, BID  enoxaparin, 30 mg, subcutaneous, q24h  formoterol, 20 mcg, nebulization, BID  guaiFENesin, 600 mg, oral, BID  insulin lispro, 0-10 Units, subcutaneous, TID with meals  insulin lispro, 10 Units, subcutaneous, TID with meals  insulin NPH (Isophane), 18 Units, subcutaneous, q12h  ipratropium-albuteroL, 3 mL, nebulization, TID  melatonin, 3 mg, oral, Daily  metoprolol tartrate, 100 mg, oral, Nightly  pantoprazole, 40 mg, oral, Daily before breakfast   Or  pantoprazole, 40 mg, intravenous, Daily before breakfast  predniSONE, 40 mg, oral, Daily  rosuvastatin, 10 mg, oral, Nightly         Objective   Physical Exam  Constitutional:       General: She is not in acute distress.  Neurological:      Mental Status: She is alert.         Last Recorded Vitals  Blood pressure 121/63, pulse 62, temperature 36.3 °C (97.4 °F), resp. rate 18, height 1.499 m (4' 11\"), weight 57.6 kg (127 lb), SpO2 99 %.  Intake/Output last 3 Shifts:  I/O last 3 completed shifts:  In: 600 (10.4 mL/kg) [P.O.:600]  Out: - (0 mL/kg)   Weight: 57.6 kg     Relevant Results  Results from last 7 days   Lab Units 01/24/24  0727 01/24/24  0508 01/23/24  1929 01/23/24  1534 01/23/24  1132 01/23/24  0729 01/23/24  0647 01/22/24  0959 01/22/24  0637 01/21/24  1210 01/21/24  1158 01/20/24  1234 01/20/24  0802   POCT GLUCOSE mg/dL 124*  --  301* 250* 172* 76  --    < >  --    < >  --    < >  --    GLUCOSE mg/dL  --  131*  --   --   --   --  61*  --  86  --  259*  --  210*    < > = values in this interval not displayed.      Latest Reference Range & Units 01/24/24 05:08   GLUCOSE 74 - 99 mg/dL 131 (H) "   SODIUM 136 - 145 mmol/L 141   POTASSIUM 3.5 - 5.3 mmol/L 4.0   CHLORIDE 98 - 107 mmol/L 111 (H)   Bicarbonate 21 - 32 mmol/L 21   Anion Gap 10 - 20 mmol/L 13   Blood Urea Nitrogen 6 - 23 mg/dL 30 (H)   Creatinine 0.50 - 1.05 mg/dL 1.71 (H)   EGFR >60 mL/min/1.73m*2 32 (L)   Calcium 8.6 - 10.3 mg/dL 8.4 (L)       Assessment/Plan   Principal Problem:    COPD exacerbation (CMS/[facility])  Active Problems:    COPD (chronic obstructive pulmonary disease) (CMS/[facility])    Type 2 diabetes mellitus, without long-term current use of insulin (CMS/[facility])    Hyperlipidemia    Hypertension    Peripheral vascular disease (CMS/[facility])    RSV (acute bronchiolitis due to respiratory syncytial virus)    Stage 3a chronic kidney disease (CMS/[facility])    IMPRESSION  TYPE 2 DIABETES MELLITUS WITH HYPERGLYCEMIA  Patient presented with hyperglycemia, further exacerbated by steroid  A1c 9.3% as of 2 months ago indicates chronic hyperglycemia  NPH held yesterday  Steroid dose decreased   Intolerance to metformin  Unable to afford Jardiance     RECOMMENDATIONS  NPH 15 units subcutaneous q12 hours  Insulin lispro 10 units QAC plus scale  Advised she will require insulin at discharge, to be determined  Favor Humalog Mix 75/25 or covered alternative, 25 units BID at present steroid level    Juan Manuel العلي MD

## 2024-01-24 NOTE — DISCHARGE SUMMARY
"Discharge Diagnosis  COPD exacerbation (CMS/[facility])    Issues Requiring Follow-Up  Continued outpatient follow-up with primary care physician and pulmonology    Discharge Meds     Your medication list        START taking these medications        Instructions Last Dose Given Next Dose Due   alcohol swabs pads, medicated      Apply 1 each topically 2 times a day.       amLODIPine 10 mg tablet  Commonly known as: Norvasc      Take 1 tablet (10 mg) by mouth once daily. Do not start before January 24, 2024.       azithromycin 500 mg tablet  Commonly known as: Zithromax      Take 1 tablet (500 mg) by mouth once every 24 hours for 1 dose. Do not start before January 24, 2024.       insulin NPH (Isophane) 100 unit/mL injection  Commonly known as: HumuLIN N,NovoLIN N      Inject 25 Units under the skin every 12 hours. Take as directed per insulin instructions.       insulin syringe,safetyneedle 29G X 1/2\" 0.5 mL syringe      Use to inject 1-4 times daily.       predniSONE 20 mg tablet  Commonly known as: Deltasone      Take 2 tablets (40 mg) by mouth once daily for 5 days. Do not start before January 24, 2024.              CONTINUE taking these medications        Instructions Last Dose Given Next Dose Due   albuterol 90 mcg/actuation inhaler      Inhale 1 puff every 6 hours if needed for shortness of breath.       aspirin 81 mg capsule           chlorhexidine 0.12 % solution  Commonly known as: Peridex           cholecalciferol 125 MCG (5000 UT) capsule  Commonly known as: Vitamin D-3           ipratropium-albuteroL 0.5-2.5 mg/3 mL nebulizer solution  Commonly known as: Duo-Neb           metoprolol tartrate 100 mg tablet  Commonly known as: Lopressor      TAKE 1/2 TABLET BY MOUTH TWICE DAILY       rosuvastatin 40 mg tablet  Commonly known as: Crestor      TAKE 1 TABLET EVERYDAY AT BEDTIME       Symbicort 160-4.5 mcg/actuation inhaler  Generic drug: budesonide-formoteroL      INHALE 1-2 PUFFS 2 TIMES A DAY. RINSE MOUTH AFTER " "USE              STOP taking these medications      empagliflozin 10 mg  Commonly known as: Jardiance        losartan 50 mg tablet  Commonly known as: Cozaar        nicotine 21 mg/24 hr patch  Commonly known as: Nicoderm CQ                  Where to Get Your Medications        You can get these medications from any pharmacy    Bring a paper prescription for each of these medications  alcohol swabs pads, medicated  amLODIPine 10 mg tablet  azithromycin 500 mg tablet  insulin NPH (Isophane) 100 unit/mL injection  insulin syringe,safetyneedle 29G X 1/2\" 0.5 mL syringe  predniSONE 20 mg tablet         Test Results Pending At Discharge  Pending Labs       No current pending labs.            Procedures       Hospital Course   Yesy was admitted to hospital with COPD exacerbation likely secondary to RSV.  She was monitored in the hospital and evaluated by the pulmonology team.  Her oxygen requirements did improve and she was kept on steroids.  However she did have a fairly large increase in her sugars and so endocrinology was consulted for diabetes management.  She will need to continue with NPH at discharge and should follow-up with her primary care physician as well as her endocrinologist.  At this time she is otherwise hemodynamically stable appropriate discharge.  She be going to respite care for a short period as she cannot take care of herself at home right now.    Blood pressure 154/70, pulse 67, temperature 36.3 °C (97.4 °F), resp. rate 18, height 1.499 m (4' 11\"), weight 57.6 kg (127 lb), SpO2 98 %.  Pertinent Physical Exam At Time of Discharge  Physical Exam  Constitutional:       Appearance: Normal appearance.   HENT:      Head: Normocephalic.      Nose: Nose normal.      Mouth/Throat:      Mouth: Mucous membranes are moist.   Cardiovascular:      Rate and Rhythm: Normal rate and regular rhythm.      Pulses: Normal pulses.      Heart sounds: Normal heart sounds.   Pulmonary:      Effort: No respiratory distress. "      Breath sounds: No wheezing or rales.      Comments:     Abdominal:      General: Abdomen is flat. Bowel sounds are normal.      Palpations: Abdomen is soft.   Skin:     General: Skin is warm.      Capillary Refill: Capillary refill takes less than 2 seconds.   Neurological:      General: No focal deficit present.      Mental Status: She is alert.         Outpatient Follow-Up  Future Appointments   Date Time Provider Department Center   2/1/2024 10:30 AM GAEL Nur-CNP [id number] [facility]   2/5/2024  3:45 PM Luh Noriega MD [id number] [facility]   2/6/2024  3:40 PM Cydney Moore MD [id number] [facility]         Lambert Carrillo DO    Time spent during this discharge: >30 min

## 2024-01-24 NOTE — CARE PLAN
The patient's goals for the shift include Pt. will have a safe, restful and uneventful evening    The clinical goals for the shift include Pt. will have no c/o shortness of breath this shift    Problem: Pain - Adult  Goal: Verbalizes/displays adequate comfort level or baseline comfort level  Outcome: Progressing     Problem: Safety - Adult  Goal: Free from fall injury  Outcome: Progressing     Problem: Discharge Planning  Goal: Discharge to home or other facility with appropriate resources  Outcome: Progressing     Problem: Chronic Conditions and Co-morbidities  Goal: Patient's chronic conditions and co-morbidity symptoms are monitored and maintained or improved  Outcome: Progressing     Problem: Diabetes  Goal: Achieve decreasing blood glucose levels by end of shift  Outcome: Progressing  Goal: Increase stability of blood glucose readings by end of shift  Outcome: Progressing  Goal: Decrease in ketones present in urine by end of shift  Outcome: Progressing  Goal: Maintain electrolyte levels within acceptable range throughout shift  Outcome: Progressing  Goal: Maintain glucose levels >70mg/dl to <250mg/dl throughout shift  Outcome: Progressing  Goal: No changes in neurological exam by end of shift  Outcome: Progressing  Goal: Learn about and adhere to nutrition recommendations by end of shift  Outcome: Progressing  Goal: Vital signs within normal range for age by end of shift  Outcome: Progressing  Goal: Increase self care and/or family involovement by end of shift  Outcome: Progressing  Goal: Receive DSME education by end of shift  Outcome: Progressing     Problem: Respiratory  Goal: Minimal/no exertional discomfort or dyspnea this shift  Outcome: Progressing  Goal: No signs of respiratory distress (eg. Use of accessory muscles. Peds grunting)  Outcome: Progressing     Problem: Fall/Injury  Goal: Not fall by end of shift  Outcome: Progressing  Goal: Be free from injury by end of the shift  Outcome:  Progressing  Goal: Verbalize understanding of personal risk factors for fall in the hospital  Outcome: Progressing  Goal: Verbalize understanding of risk factor reduction measures to prevent injury from fall in the home  Outcome: Progressing  Goal: Use assistive devices by end of the shift  Outcome: Progressing  Goal: Pace activities to prevent fatigue by end of the shift  Outcome: Progressing

## 2024-01-25 VITALS
DIASTOLIC BLOOD PRESSURE: 72 MMHG | BODY MASS INDEX: 25.6 KG/M2 | TEMPERATURE: 99.5 F | WEIGHT: 127 LBS | OXYGEN SATURATION: 92 % | HEIGHT: 59 IN | HEART RATE: 63 BPM | SYSTOLIC BLOOD PRESSURE: 112 MMHG | RESPIRATION RATE: 18 BRPM

## 2024-01-25 LAB — GLUCOSE BLD MANUAL STRIP-MCNC: 159 MG/DL (ref 74–99)

## 2024-01-25 PROCEDURE — 2500000002 HC RX 250 W HCPCS SELF ADMINISTERED DRUGS (ALT 637 FOR MEDICARE OP, ALT 636 FOR OP/ED): Performed by: INTERNAL MEDICINE

## 2024-01-25 PROCEDURE — 2500000001 HC RX 250 WO HCPCS SELF ADMINISTERED DRUGS (ALT 637 FOR MEDICARE OP): Performed by: NURSE PRACTITIONER

## 2024-01-25 PROCEDURE — 2500000004 HC RX 250 GENERAL PHARMACY W/ HCPCS (ALT 636 FOR OP/ED): Performed by: INTERNAL MEDICINE

## 2024-01-25 PROCEDURE — 2500000004 HC RX 250 GENERAL PHARMACY W/ HCPCS (ALT 636 FOR OP/ED): Performed by: PHYSICIAN ASSISTANT

## 2024-01-25 PROCEDURE — 82947 ASSAY GLUCOSE BLOOD QUANT: CPT

## 2024-01-25 PROCEDURE — 94640 AIRWAY INHALATION TREATMENT: CPT

## 2024-01-25 PROCEDURE — 2500000001 HC RX 250 WO HCPCS SELF ADMINISTERED DRUGS (ALT 637 FOR MEDICARE OP): Performed by: PHYSICIAN ASSISTANT

## 2024-01-25 PROCEDURE — 2500000002 HC RX 250 W HCPCS SELF ADMINISTERED DRUGS (ALT 637 FOR MEDICARE OP, ALT 636 FOR OP/ED): Performed by: PHYSICIAN ASSISTANT

## 2024-01-25 PROCEDURE — 94668 MNPJ CHEST WALL SBSQ: CPT

## 2024-01-25 PROCEDURE — 99231 SBSQ HOSP IP/OBS SF/LOW 25: CPT | Performed by: INTERNAL MEDICINE

## 2024-01-25 RX ADMIN — BUDESONIDE 0.5 MG: 0.5 INHALANT ORAL at 07:15

## 2024-01-25 RX ADMIN — PREDNISONE 40 MG: 20 TABLET ORAL at 09:46

## 2024-01-25 RX ADMIN — FORMOTEROL FUMARATE DIHYDRATE 20 MCG: 20 SOLUTION RESPIRATORY (INHALATION) at 07:15

## 2024-01-25 RX ADMIN — INSULIN HUMAN 15 UNITS: 100 INJECTION, SUSPENSION SUBCUTANEOUS at 09:46

## 2024-01-25 RX ADMIN — IPRATROPIUM BROMIDE AND ALBUTEROL SULFATE 3 ML: 2.5; .5 SOLUTION RESPIRATORY (INHALATION) at 07:05

## 2024-01-25 RX ADMIN — PANTOPRAZOLE SODIUM 40 MG: 40 TABLET, DELAYED RELEASE ORAL at 07:39

## 2024-01-25 RX ADMIN — ASPIRIN 81 MG: 81 TABLET, COATED ORAL at 09:46

## 2024-01-25 RX ADMIN — AMLODIPINE BESYLATE 10 MG: 10 TABLET ORAL at 09:46

## 2024-01-25 RX ADMIN — INSULIN LISPRO 2 UNITS: 100 INJECTION, SOLUTION INTRAVENOUS; SUBCUTANEOUS at 09:46

## 2024-01-25 NOTE — PROGRESS NOTES
"Yesy Ayala is a 68 y.o. female on day 9 of admission presenting with COPD exacerbation (CMS/[facility]).    Subjective   No new complaints  Planning discharge to SNF     Scheduled medications  amLODIPine, 10 mg, oral, Daily  aspirin, 81 mg, oral, Daily  budesonide, 0.5 mg, nebulization, BID  diclofenac sodium, 4 g, Topical, BID  enoxaparin, 30 mg, subcutaneous, q24h  formoterol, 20 mcg, nebulization, BID  guaiFENesin, 600 mg, oral, BID  insulin lispro, 0-10 Units, subcutaneous, TID with meals  insulin lispro, 6 Units, subcutaneous, TID with meals  insulin NPH (Isophane), 15 Units, subcutaneous, q12h  ipratropium-albuteroL, 3 mL, nebulization, TID  melatonin, 3 mg, oral, Daily  metoprolol tartrate, 100 mg, oral, Nightly  pantoprazole, 40 mg, oral, Daily before breakfast   Or  pantoprazole, 40 mg, intravenous, Daily before breakfast  predniSONE, 40 mg, oral, Daily  rosuvastatin, 10 mg, oral, Nightly      Objective   Physical Exam  Constitutional:       General: She is not in acute distress.  Neurological:      Mental Status: She is alert.         Last Recorded Vitals  Blood pressure 112/72, pulse 63, temperature 37.5 °C (99.5 °F), resp. rate 18, height 1.499 m (4' 11\"), weight 57.6 kg (127 lb), SpO2 92 %.  Intake/Output last 3 Shifts:  I/O last 3 completed shifts:  In: 240 (4.2 mL/kg) [P.O.:240]  Out: - (0 mL/kg)   Weight: 57.6 kg     Relevant Results  Results from last 7 days   Lab Units 01/24/24  2229 01/24/24  1549 01/24/24  1125 01/24/24  0727 01/24/24  0508 01/23/24  1929 01/23/24  0729 01/23/24  0647 01/22/24  0959 01/22/24  0637 01/21/24  1210 01/21/24  1158 01/20/24  1234 01/20/24  0802   POCT GLUCOSE mg/dL 380* 245* 189* 124*  --  301*   < >  --    < >  --    < >  --    < >  --    GLUCOSE mg/dL  --   --   --   --  131*  --   --  61*  --  86  --  259*  --  210*    < > = values in this interval not displayed.         Assessment/Plan   Principal Problem:    COPD exacerbation (CMS/[facility])  Active Problems:    COPD " (chronic obstructive pulmonary disease) (CMS/[facility])    Type 2 diabetes mellitus, without long-term current use of insulin (CMS/[facility])    Hyperlipidemia    Hypertension    Peripheral vascular disease (CMS/[facility])    RSV (acute bronchiolitis due to respiratory syncytial virus)    Stage 3a chronic kidney disease (CMS/[facility])      IMPRESSION  TYPE 2 DIABETES MELLITUS WITH HYPERGLYCEMIA  Patient presented with hyperglycemia, further exacerbated by steroid  A1c 9.3% as of 2 months ago indicates chronic hyperglycemia  Steroid dose decreased   Glucose back up yesterday evening  Intolerance to metformin  Unable to afford Jardiance     RECOMMENDATIONS  Resume NPH 18 units subcutaneous q12 hours  Insulin lispro 10 units QAC plus scale  Continue NPH and lispro while she is in a healthcare setting  At discharge home, favor Humalog Mix 75/25 or covered alternative, 20-25 units BID      Juan Manuel العلي MD

## 2024-01-25 NOTE — PROGRESS NOTES
01/25/2024 0739 Prescriptions x 6 refaxed to [facility] ( fax [phone]) Success confirmation print out received.   Alondra MULLIGAN [facility]    01/25/2024 0912 Medically ready for discharge today. Accepted at [facility] .  confirms transport with ambulance  @ 1100 today.  Call back from [facility] ( [phone]). Confirms patint is all set for admit. Prescriptions received to Pharmacy.  Alondra MULLIGAN [facility]    01/25/2024 1042 Patient discharged to [facility] via wheelchair transport.  Alondra MULLIGAN [facility]

## 2024-01-25 NOTE — CARE PLAN
The patient's goals for the shift include Pt. will have a safe, restful and uneventful evening    The clinical goals for the shift include Pt. will have no c/o pain this shift  Problem: Pain - Adult  Goal: Verbalizes/displays adequate comfort level or baseline comfort level  Outcome: Progressing     Problem: Safety - Adult  Goal: Free from fall injury  Outcome: Progressing     Problem: Discharge Planning  Goal: Discharge to home or other facility with appropriate resources  Outcome: Progressing     Problem: Chronic Conditions and Co-morbidities  Goal: Patient's chronic conditions and co-morbidity symptoms are monitored and maintained or improved  Outcome: Progressing     Problem: Diabetes  Goal: Achieve decreasing blood glucose levels by end of shift  Outcome: Progressing  Goal: Increase stability of blood glucose readings by end of shift  Outcome: Progressing  Goal: Decrease in ketones present in urine by end of shift  Outcome: Progressing  Goal: Maintain electrolyte levels within acceptable range throughout shift  Outcome: Progressing  Goal: Maintain glucose levels >70mg/dl to <250mg/dl throughout shift  Outcome: Progressing  Goal: No changes in neurological exam by end of shift  Outcome: Progressing  Goal: Learn about and adhere to nutrition recommendations by end of shift  Outcome: Progressing  Goal: Vital signs within normal range for age by end of shift  Outcome: Progressing  Goal: Increase self care and/or family involovement by end of shift  Outcome: Progressing  Goal: Receive DSME education by end of shift  Outcome: Progressing     Problem: Respiratory  Goal: Minimal/no exertional discomfort or dyspnea this shift  Outcome: Progressing  Goal: No signs of respiratory distress (eg. Use of accessory muscles. Peds grunting)  Outcome: Progressing     Problem: Fall/Injury  Goal: Not fall by end of shift  Outcome: Progressing  Goal: Be free from injury by end of the shift  Outcome: Progressing  Goal: Verbalize  understanding of personal risk factors for fall in the hospital  Outcome: Progressing  Goal: Verbalize understanding of risk factor reduction measures to prevent injury from fall in the home  Outcome: Progressing  Goal: Use assistive devices by end of the shift  Outcome: Progressing  Goal: Pace activities to prevent fatigue by end of the shift  Outcome: Progressing

## 2024-01-26 ENCOUNTER — HOSPITAL ENCOUNTER (INPATIENT)
Facility: HOSPITAL | Age: 69
LOS: 3 days | Discharge: HOME | DRG: 192 | End: 2024-01-29
Attending: STUDENT IN AN ORGANIZED HEALTH CARE EDUCATION/TRAINING PROGRAM | Admitting: INTERNAL MEDICINE
Payer: COMMERCIAL

## 2024-01-26 ENCOUNTER — APPOINTMENT (OUTPATIENT)
Dept: RADIOLOGY | Facility: HOSPITAL | Age: 69
DRG: 192 | End: 2024-01-26
Payer: COMMERCIAL

## 2024-01-26 ENCOUNTER — APPOINTMENT (OUTPATIENT)
Dept: CARDIOLOGY | Facility: HOSPITAL | Age: 69
DRG: 192 | End: 2024-01-26
Payer: COMMERCIAL

## 2024-01-26 DIAGNOSIS — R06.02 SHORTNESS OF BREATH: Primary | ICD-10-CM

## 2024-01-26 DIAGNOSIS — R06.2 WHEEZING: ICD-10-CM

## 2024-01-26 DIAGNOSIS — I25.10 ASHD (ARTERIOSCLEROTIC HEART DISEASE): ICD-10-CM

## 2024-01-26 DIAGNOSIS — E11.65 TYPE 2 DIABETES MELLITUS WITH HYPERGLYCEMIA, UNSPECIFIED WHETHER LONG TERM INSULIN USE (MULTI): ICD-10-CM

## 2024-01-26 DIAGNOSIS — R79.89 ELEVATED TROPONIN: ICD-10-CM

## 2024-01-26 DIAGNOSIS — J44.1 COPD EXACERBATION (MULTI): ICD-10-CM

## 2024-01-26 LAB
ALBUMIN SERPL BCP-MCNC: 2.9 G/DL (ref 3.4–5)
ALP SERPL-CCNC: 77 U/L (ref 33–136)
ALT SERPL W P-5'-P-CCNC: 30 U/L (ref 7–45)
ANION GAP SERPL CALC-SCNC: 11 MMOL/L (ref 10–20)
AST SERPL W P-5'-P-CCNC: 17 U/L (ref 9–39)
BASOPHILS # BLD AUTO: 0.03 X10*3/UL (ref 0–0.1)
BASOPHILS NFR BLD AUTO: 0.2 %
BILIRUB SERPL-MCNC: 0.7 MG/DL (ref 0–1.2)
BNP SERPL-MCNC: 120 PG/ML (ref 0–99)
BUN SERPL-MCNC: 29 MG/DL (ref 6–23)
CALCIUM SERPL-MCNC: 8.5 MG/DL (ref 8.6–10.3)
CARDIAC TROPONIN I PNL SERPL HS: 39 NG/L (ref 0–13)
CARDIAC TROPONIN I PNL SERPL HS: 40 NG/L (ref 0–13)
CARDIAC TROPONIN I PNL SERPL HS: 47 NG/L (ref 0–13)
CARDIAC TROPONIN I PNL SERPL HS: NORMAL
CHLORIDE SERPL-SCNC: 106 MMOL/L (ref 98–107)
CO2 SERPL-SCNC: 24 MMOL/L (ref 21–32)
CREAT SERPL-MCNC: 1.69 MG/DL (ref 0.5–1.05)
EGFRCR SERPLBLD CKD-EPI 2021: 33 ML/MIN/1.73M*2
EOSINOPHIL # BLD AUTO: 0.02 X10*3/UL (ref 0–0.7)
EOSINOPHIL NFR BLD AUTO: 0.2 %
ERYTHROCYTE [DISTWIDTH] IN BLOOD BY AUTOMATED COUNT: 18.6 % (ref 11.5–14.5)
FLUAV RNA RESP QL NAA+PROBE: NOT DETECTED
FLUBV RNA RESP QL NAA+PROBE: NOT DETECTED
GLUCOSE BLD MANUAL STRIP-MCNC: 260 MG/DL (ref 74–99)
GLUCOSE BLD MANUAL STRIP-MCNC: 281 MG/DL (ref 74–99)
GLUCOSE BLD MANUAL STRIP-MCNC: 441 MG/DL (ref 74–99)
GLUCOSE SERPL-MCNC: 447 MG/DL (ref 74–99)
HCT VFR BLD AUTO: 37.4 % (ref 36–46)
HGB BLD-MCNC: 12.3 G/DL (ref 12–16)
IMM GRANULOCYTES # BLD AUTO: 0.12 X10*3/UL (ref 0–0.7)
IMM GRANULOCYTES NFR BLD AUTO: 1 % (ref 0–0.9)
LYMPHOCYTES # BLD AUTO: 4.34 X10*3/UL (ref 1.2–4.8)
LYMPHOCYTES NFR BLD AUTO: 34.6 %
MCH RBC QN AUTO: 20.9 PG (ref 26–34)
MCHC RBC AUTO-ENTMCNC: 32.9 G/DL (ref 32–36)
MCV RBC AUTO: 64 FL (ref 80–100)
MONOCYTES # BLD AUTO: 0.93 X10*3/UL (ref 0.1–1)
MONOCYTES NFR BLD AUTO: 7.4 %
NEUTROPHILS # BLD AUTO: 7.09 X10*3/UL (ref 1.2–7.7)
NEUTROPHILS NFR BLD AUTO: 56.6 %
NRBC BLD-RTO: 0 /100 WBCS (ref 0–0)
PLATELET # BLD AUTO: 90 X10*3/UL (ref 150–450)
POTASSIUM SERPL-SCNC: 4.1 MMOL/L (ref 3.5–5.3)
PROT SERPL-MCNC: 5.5 G/DL (ref 6.4–8.2)
RBC # BLD AUTO: 5.88 X10*6/UL (ref 4–5.2)
RSV RNA RESP QL NAA+PROBE: DETECTED
SARS-COV-2 RNA RESP QL NAA+PROBE: NOT DETECTED
SODIUM SERPL-SCNC: 137 MMOL/L (ref 136–145)
WBC # BLD AUTO: 12.5 X10*3/UL (ref 4.4–11.3)

## 2024-01-26 PROCEDURE — 2500000002 HC RX 250 W HCPCS SELF ADMINISTERED DRUGS (ALT 637 FOR MEDICARE OP, ALT 636 FOR OP/ED)

## 2024-01-26 PROCEDURE — 2500000002 HC RX 250 W HCPCS SELF ADMINISTERED DRUGS (ALT 637 FOR MEDICARE OP, ALT 636 FOR OP/ED): Performed by: STUDENT IN AN ORGANIZED HEALTH CARE EDUCATION/TRAINING PROGRAM

## 2024-01-26 PROCEDURE — 84484 ASSAY OF TROPONIN QUANT: CPT

## 2024-01-26 PROCEDURE — 71046 X-RAY EXAM CHEST 2 VIEWS: CPT

## 2024-01-26 PROCEDURE — 99285 EMERGENCY DEPT VISIT HI MDM: CPT | Performed by: STUDENT IN AN ORGANIZED HEALTH CARE EDUCATION/TRAINING PROGRAM

## 2024-01-26 PROCEDURE — G0378 HOSPITAL OBSERVATION PER HR: HCPCS

## 2024-01-26 PROCEDURE — 2500000001 HC RX 250 WO HCPCS SELF ADMINISTERED DRUGS (ALT 637 FOR MEDICARE OP): Performed by: PHYSICIAN ASSISTANT

## 2024-01-26 PROCEDURE — 94640 AIRWAY INHALATION TREATMENT: CPT

## 2024-01-26 PROCEDURE — 1200000002 HC GENERAL ROOM WITH TELEMETRY DAILY

## 2024-01-26 PROCEDURE — 82947 ASSAY GLUCOSE BLOOD QUANT: CPT

## 2024-01-26 PROCEDURE — 36415 COLL VENOUS BLD VENIPUNCTURE: CPT

## 2024-01-26 PROCEDURE — 80053 COMPREHEN METABOLIC PANEL: CPT

## 2024-01-26 PROCEDURE — 99223 1ST HOSP IP/OBS HIGH 75: CPT | Performed by: PHYSICIAN ASSISTANT

## 2024-01-26 PROCEDURE — 93005 ELECTROCARDIOGRAM TRACING: CPT

## 2024-01-26 PROCEDURE — 84484 ASSAY OF TROPONIN QUANT: CPT | Performed by: PHYSICIAN ASSISTANT

## 2024-01-26 PROCEDURE — 83880 ASSAY OF NATRIURETIC PEPTIDE: CPT

## 2024-01-26 PROCEDURE — 87637 SARSCOV2&INF A&B&RSV AMP PRB: CPT | Performed by: HOSPITALIST

## 2024-01-26 PROCEDURE — 2500000004 HC RX 250 GENERAL PHARMACY W/ HCPCS (ALT 636 FOR OP/ED): Performed by: PHYSICIAN ASSISTANT

## 2024-01-26 PROCEDURE — 84484 ASSAY OF TROPONIN QUANT: CPT | Performed by: HOSPITALIST

## 2024-01-26 PROCEDURE — 85025 COMPLETE CBC W/AUTO DIFF WBC: CPT

## 2024-01-26 PROCEDURE — 2500000002 HC RX 250 W HCPCS SELF ADMINISTERED DRUGS (ALT 637 FOR MEDICARE OP, ALT 636 FOR OP/ED): Performed by: PHYSICIAN ASSISTANT

## 2024-01-26 RX ORDER — ENOXAPARIN SODIUM 100 MG/ML
30 INJECTION SUBCUTANEOUS EVERY 24 HOURS
Status: DISCONTINUED | OUTPATIENT
Start: 2024-01-26 | End: 2024-01-29 | Stop reason: HOSPADM

## 2024-01-26 RX ORDER — IPRATROPIUM BROMIDE AND ALBUTEROL SULFATE 2.5; .5 MG/3ML; MG/3ML
3 SOLUTION RESPIRATORY (INHALATION)
Status: DISCONTINUED | OUTPATIENT
Start: 2024-01-26 | End: 2024-01-26

## 2024-01-26 RX ORDER — TALC
3 POWDER (GRAM) TOPICAL DAILY
Status: DISCONTINUED | OUTPATIENT
Start: 2024-01-26 | End: 2024-01-29 | Stop reason: HOSPADM

## 2024-01-26 RX ORDER — IPRATROPIUM BROMIDE AND ALBUTEROL SULFATE 2.5; .5 MG/3ML; MG/3ML
3 SOLUTION RESPIRATORY (INHALATION) EVERY 20 MIN
Status: COMPLETED | OUTPATIENT
Start: 2024-01-26 | End: 2024-01-26

## 2024-01-26 RX ORDER — AMOXICILLIN 250 MG
2 CAPSULE ORAL 2 TIMES DAILY
Status: DISCONTINUED | OUTPATIENT
Start: 2024-01-26 | End: 2024-01-29 | Stop reason: HOSPADM

## 2024-01-26 RX ORDER — INSULIN LISPRO 100 [IU]/ML
3 INJECTION, SOLUTION INTRAVENOUS; SUBCUTANEOUS ONCE
Status: COMPLETED | OUTPATIENT
Start: 2024-01-26 | End: 2024-01-26

## 2024-01-26 RX ORDER — PREDNISONE 20 MG/1
40 TABLET ORAL DAILY
Status: DISCONTINUED | OUTPATIENT
Start: 2024-01-27 | End: 2024-01-28

## 2024-01-26 RX ORDER — ALBUTEROL SULFATE 0.83 MG/ML
2.5 SOLUTION RESPIRATORY (INHALATION) EVERY 4 HOURS PRN
Status: DISCONTINUED | OUTPATIENT
Start: 2024-01-26 | End: 2024-01-29 | Stop reason: HOSPADM

## 2024-01-26 RX ORDER — INSULIN LISPRO 100 [IU]/ML
0-10 INJECTION, SOLUTION INTRAVENOUS; SUBCUTANEOUS
Status: DISCONTINUED | OUTPATIENT
Start: 2024-01-26 | End: 2024-01-29 | Stop reason: HOSPADM

## 2024-01-26 RX ORDER — IPRATROPIUM BROMIDE AND ALBUTEROL SULFATE 2.5; .5 MG/3ML; MG/3ML
3 SOLUTION RESPIRATORY (INHALATION)
Status: DISCONTINUED | OUTPATIENT
Start: 2024-01-26 | End: 2024-01-29 | Stop reason: HOSPADM

## 2024-01-26 RX ORDER — DEXTROSE 50 % IN WATER (D50W) INTRAVENOUS SYRINGE
25
Status: DISCONTINUED | OUTPATIENT
Start: 2024-01-26 | End: 2024-01-29 | Stop reason: HOSPADM

## 2024-01-26 RX ORDER — DEXTROSE MONOHYDRATE 100 MG/ML
0.3 INJECTION, SOLUTION INTRAVENOUS ONCE AS NEEDED
Status: DISCONTINUED | OUTPATIENT
Start: 2024-01-26 | End: 2024-01-29 | Stop reason: HOSPADM

## 2024-01-26 RX ORDER — PANTOPRAZOLE SODIUM 40 MG/1
40 TABLET, DELAYED RELEASE ORAL
Status: DISCONTINUED | OUTPATIENT
Start: 2024-01-27 | End: 2024-01-29 | Stop reason: HOSPADM

## 2024-01-26 RX ORDER — ONDANSETRON 4 MG/1
4 TABLET, ORALLY DISINTEGRATING ORAL EVERY 8 HOURS PRN
Status: DISCONTINUED | OUTPATIENT
Start: 2024-01-26 | End: 2024-01-29 | Stop reason: HOSPADM

## 2024-01-26 RX ORDER — PANTOPRAZOLE SODIUM 40 MG/10ML
40 INJECTION, POWDER, LYOPHILIZED, FOR SOLUTION INTRAVENOUS
Status: DISCONTINUED | OUTPATIENT
Start: 2024-01-27 | End: 2024-01-29 | Stop reason: HOSPADM

## 2024-01-26 RX ORDER — ONDANSETRON HYDROCHLORIDE 2 MG/ML
4 INJECTION, SOLUTION INTRAVENOUS EVERY 8 HOURS PRN
Status: DISCONTINUED | OUTPATIENT
Start: 2024-01-26 | End: 2024-01-29 | Stop reason: HOSPADM

## 2024-01-26 RX ADMIN — IPRATROPIUM BROMIDE AND ALBUTEROL SULFATE 3 ML: 2.5; .5 SOLUTION RESPIRATORY (INHALATION) at 19:00

## 2024-01-26 RX ADMIN — INSULIN LISPRO 6 UNITS: 100 INJECTION, SOLUTION INTRAVENOUS; SUBCUTANEOUS at 21:20

## 2024-01-26 RX ADMIN — IPRATROPIUM BROMIDE AND ALBUTEROL SULFATE 3 ML: .5; 3 SOLUTION RESPIRATORY (INHALATION) at 15:20

## 2024-01-26 RX ADMIN — Medication 3 MG: at 21:49

## 2024-01-26 RX ADMIN — IPRATROPIUM BROMIDE AND ALBUTEROL SULFATE 3 ML: .5; 3 SOLUTION RESPIRATORY (INHALATION) at 15:00

## 2024-01-26 RX ADMIN — IPRATROPIUM BROMIDE AND ALBUTEROL SULFATE 3 ML: .5; 3 SOLUTION RESPIRATORY (INHALATION) at 15:10

## 2024-01-26 RX ADMIN — SENNOSIDES AND DOCUSATE SODIUM 2 TABLET: 8.6; 5 TABLET ORAL at 21:49

## 2024-01-26 RX ADMIN — ENOXAPARIN SODIUM 30 MG: 30 INJECTION SUBCUTANEOUS at 21:49

## 2024-01-26 RX ADMIN — INSULIN LISPRO 3 UNITS: 100 INJECTION, SOLUTION INTRAVENOUS; SUBCUTANEOUS at 18:04

## 2024-01-26 ASSESSMENT — COGNITIVE AND FUNCTIONAL STATUS - GENERAL
MOBILITY SCORE: 24
DAILY ACTIVITIY SCORE: 24

## 2024-01-26 ASSESSMENT — PAIN SCALES - GENERAL: PAINLEVEL_OUTOF10: 0 - NO PAIN

## 2024-01-26 NOTE — H&P
History Of Present Illness  Yesy Ayala is a 68 y.o. female presenting with COPD exacerbation, hyperglycemia, unable to manage medications, shortness of breath.  Patient has a history of CKD, COPD, diabetes, hypertension.  She recently had a COPD exacerbation likely secondary to RSV.  She was admitted from January 15 through January 25.  She was discharged yesterday to respite care supposedly.  Patient states she was sent to [facility] where they did nothing for her.  They handed her a bag of medication and told the patient to contact the pharmacist about how to take them and did nothing to assist her.  Patient was uncomfortable taking and managing the medications on her own so she has not had any medication since she was discharged yesterday a.m.  Patient denies chest pain.  She still has a dry cough but that is improving.  She does feel more short of breath today because she has not had any medications and she has a little leg edema but she did have that while in-house.  She has had slight constipation for 2 days but then had a normal bowel movement last night.  She has no other complaints..    In the emergency department patient had a EKG that showed sinus with occasional PVCs but no signs of ischemia and no change from January 15.  She was treated with 3 DuoNeb aerosols   And 3 units of lispro for her elevated blood sugar.  Patient is not in DKA.  Patient's chest x-ray showed no acute process.    Patient had a duplex ultrasound of the bilateral lower extremities on January 22 which was negative for DVT.  Patient's lab work reveals metabolic panel with a blood sugar of 447.  BUN is 29.  Creatinine is 1.69 but she has a history of CKD and it was 1.71 on December 24.  GFR is 33, calcium is 8.5, albumin is 2.9, anion gap is normal at 11, total protein is 5.5, BNP was 120 and her troponin was 40.  Second troponin was 39.  Her troponin on admission on January 15 was 46.  CBC showed a white count of 12.5, likely from  the steroids.  White count was 17.2 on January 23.  Patient does have low platelets at 90,000 and her hemoglobin hematocrit were 12.3 and 37.4.  Platelets 403,000 on January 23.    Past medical history: CKD, COPD, RSV, hypertension, diabetes, hyperlipidemia, peripheral vascular disease, hyperglycemia with recent admission from January 15 to January 25 for RSV, COPD, and hyperglycemia.    Medications: Amlodipine, insulin, prednisone, albuterol, aspirin, Peridex, vitamin D3, Atrovent, metoprolol, Crestor, Symbicort.  Albuterol.    Allergies are penicillin, metformin, sulfa    Social history: Ex-smoker x 2 weeks since she got sick with the RSV, denies alcohol use.     Past Medical History:   Diagnosis Date    Body mass index (BMI) 27.0-27.9, adult 01/18/2020    BMI 27.0-27.9,adult    Other conditions influencing health status     Coronary Artery Disease    Personal history of other diseases of the circulatory system     History of hypertension    Personal history of other diseases of the circulatory system     History of intermittent claudication    Personal history of other diseases of the circulatory system     History of peripheral vascular disease    Personal history of other endocrine, nutritional and metabolic disease     History of hyperlipidemia     Past Surgical History:   Procedure Laterality Date    OTHER SURGICAL HISTORY  08/22/2013    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     Allergies  Penicillin, Metformin, and Sulfa (sulfonamide antibiotics)    Review of Systems  Patient denies chest pain,  nausea, vomiting, fever, chills, diarrhea,   vision changes, rashes, dysuria, paresthesias, vertigo, headache,  or any other complaints at this time. A complete review of systems was done, and is as stated in the history of present illness, is otherwise negative or not pertinent to the complaint.    Physical Exam  Physical exam: Vital signs and nurses notes were reviewed.    General:  no acute distress. Alert and oriented  x 3.  Talks easily in full sentences    Head: atraumatic and normocephalic    Eyes: Pupils equal round reactive to light, EOMs are intact, conjunctivae is not injected.    Oropharynx: No erythema or exudate noted, no trismus or drooling, buccal mucosa is moist.    Ears:  normal external exam, no swelling or erythema,     Nasal: normal external exam,     Neck: Supple, full range of motion,     Cardiac: Regular rate and rhythm. No murmurs noted.     Pulmonary: Talks in full sentences but does have an end expiratory wheeze faintly bilaterally.  No accessory muscle use no retraction noted.    Abdomen: Soft,  Nontender. No guarding, rigidity, or distention. Normoactive bowel sounds. No pulsatile masses, no bruits.     Extremities:  Full range of motion.  No pitting edema.    Skin: No rash seen. Skin is warm and dry     Neuro: Patient is alert and oriented x3. Speech is clear. There is no asymmetry with facial grimaces, and no tongue deviation. Patient moves all extremities independently. Sensation is intact. No obvious neuro deficits are noted.     Last Recorded Vitals  /59   Pulse 96   Temp 37.2 °C (98.9 °F) (Oral)   Resp 18   Wt 60.3 kg (133 lb)   SpO2 98%     Relevant Results  Scheduled medications  insulin lispro, 0-10 Units, subcutaneous, TID with meals  ipratropium-albuteroL, 3 mL, nebulization, TID  perflutren lipid microspheres, 0.5-10 mL  of dilution, intravenous, Once in imaging  perflutren protein A microsphere, 0.5 mL, intravenous, Once in imaging  [START ON 1/27/2024] predniSONE, 40 mg, oral, Daily  sulfur hexafluoride microsphr, 2 mL, intravenous, Once in imaging      Continuous medications     PRN medications  PRN medications: albuterol, dextrose 10 % in water (D10W), dextrose, glucagon, oxygen    Results for orders placed or performed during the hospital encounter of 01/26/24 (from the past 24 hour(s))   CBC and Auto Differential   Result Value Ref Range    WBC 12.5 (H) 4.4 - 11.3 x10*3/uL    nRBC 0.0 0.0 - 0.0 /100 WBCs    RBC 5.88 (H) 4.00 - 5.20 x10*6/uL    Hemoglobin 12.3 12.0 - 16.0 g/dL    Hematocrit 37.4 36.0 - 46.0 %    MCV 64 (L) 80 - 100 fL    MCH 20.9 (L) 26.0 - 34.0 pg    MCHC 32.9 32.0 - 36.0 g/dL    RDW 18.6 (H) 11.5 - 14.5 %    Platelets 90 (L) 150 - 450 x10*3/uL    Neutrophils % 56.6 40.0 - 80.0 %    Immature Granulocytes %, Automated 1.0 (H) 0.0 - 0.9 %    Lymphocytes % 34.6 13.0 - 44.0 %    Monocytes % 7.4 2.0 - 10.0 %    Eosinophils % 0.2 0.0 - 6.0 %    Basophils % 0.2 0.0 - 2.0 %    Neutrophils Absolute 7.09 1.20 - 7.70 x10*3/uL    Immature Granulocytes Absolute, Automated 0.12 0.00 - 0.70 x10*3/uL    Lymphocytes Absolute 4.34 1.20 - 4.80 x10*3/uL    Monocytes Absolute 0.93 0.10 - 1.00 x10*3/uL    Eosinophils Absolute 0.02 0.00 - 0.70 x10*3/uL    Basophils Absolute 0.03 0.00 - 0.10 x10*3/uL   Comprehensive metabolic panel   Result Value Ref Range    Glucose 447 (H) 74 - 99 mg/dL    Sodium 137 136 - 145 mmol/L    Potassium 4.1 3.5 - 5.3 mmol/L    Chloride 106 98 - 107 mmol/L    Bicarbonate 24 21 - 32 mmol/L    Anion Gap 11 10 - 20 mmol/L    Urea Nitrogen 29 (H) 6 - 23 mg/dL    Creatinine 1.69 (H) 0.50 - 1.05 mg/dL    eGFR 33 (L) >60 mL/min/1.73m*2    Calcium 8.5 (L) 8.6 - 10.3 mg/dL    Albumin 2.9 (L) 3.4 - 5.0 g/dL    Alkaline Phosphatase 77 33 - 136 U/L    Total Protein 5.5 (L) 6.4 - 8.2 g/dL    AST 17 9 - 39 U/L     Bilirubin, Total 0.7 0.0 - 1.2 mg/dL    ALT 30 7 - 45 U/L   Troponin I, High Sensitivity   Result Value Ref Range    Troponin I, High Sensitivity 40 (H) 0 - 13 ng/L   B-Type Natriuretic Peptide   Result Value Ref Range     (H) 0 - 99 pg/mL   Troponin I, High Sensitivity   Result Value Ref Range    Troponin I, High Sensitivity 39 (H) 0 - 13 ng/L   POCT GLUCOSE   Result Value Ref Range    POCT Glucose 441 (H) 74 - 99 mg/dL     XR chest 2 views   Final Result   1.  No evidence of acute cardiopulmonary process.                  MACRO:   None        Signed by: Yesy Mustafa 1/26/2024 4:06 PM   Dictation workstation:   [id number]      Transthoracic Echo (TTE) Complete    (Results Pending)          Assessment/Plan   Principal Problem:    COPD exacerbation (CMS/[facility])  Active Problems:    Hyperglycemia due to diabetes mellitus (CMS/[facility])    Shortness of breath    Elevated troponin      Plan: Echo ordered secondary to shortness of breath with a normal chest x-ray and she does have a slightly elevated troponin although it is likely chronic.  She has a history of leg edema but none seen currently.  DuoNebs and albuterol as needed,  Telemetry  Oxygen  Sliding scale insulin plus her Humulin N  Home medications including: Prednisone, Amlodipine, aspirin, metoprolol, Crestor, Breo  Repeat labs in the morning  PT, OT, social work consults ordered as this patient may need placement.  Findings, orders, plan all discussed with Dr. Gerardo Amezcua PA-C

## 2024-01-26 NOTE — ED PROVIDER NOTES
CC: Shortness of Breath (Pt just Dc'd a few days ago, worsening SOB the past couple days, increased bilateral leg edema, pt RSV + 2 days ago while admitted)     HPI: Yesy Ayala is an 68 y.o. female with history including COPD, DM presenting to the emergency department for shortness of breath. Patient was discharged from the hospital after being admitted for a COPD exacerbation in the setting of RSV complicated by hyperglycemia. Patient was discharged to a assisted living facility where she was not given instructions on how to take her medications. She was reporting shortness of breath that got worse this morning. She notes that she has not taken an albuterol inhaler today. She notes that she has not taken any of her medications today.     Limitations to History: dyspnea on initial evaluation  Additional History Obtained from:  chart review    PMHx/PSHx:  Per HPI.   - has a past medical history of Body mass index (BMI) 27.0-27.9, adult (01/18/2020), Other conditions influencing health status, Personal history of other diseases of the circulatory system, Personal history of other diseases of the circulatory system, Personal history of other diseases of the circulatory system, and Personal history of other endocrine, nutritional and metabolic disease.  - has a past surgical history that includes Other surgical history (08/22/2013); Other surgical history (08/22/2013); and Other surgical history (02/20/2014).    Social History:  - Tobacco:  reports that she has quit smoking. Her smoking use included cigarettes. She has a 1.00 pack-year smoking history. She has never been exposed to tobacco smoke. She has never used smokeless tobacco.   - Alcohol:  reports no history of alcohol use.   - Drugs:  reports that she does not currently use drugs.     Medications: Reviewed in EMR.     Allergies:  Penicillin, Metformin, and Sulfa (sulfonamide  antibiotics)    ???????????????????????????????????????????????????????????????  Triage Vitals:  T 37.2 °C (98.9 °F)  HR 84  /68  RR 18  O2 99 % None (Room air)    Physical Exam  Vitals and nursing note reviewed.   Constitutional:       General: She is not in acute distress.  HENT:      Head: Normocephalic and atraumatic.   Eyes:      Conjunctiva/sclera: Conjunctivae normal.   Cardiovascular:      Rate and Rhythm: Normal rate and regular rhythm.   Pulmonary:      Effort: Pulmonary effort is normal. No respiratory distress.      Breath sounds: Examination of the left-upper field reveals wheezing and rales. Wheezing and rales present.   Abdominal:      General: There is no distension.      Palpations: Abdomen is soft.   Musculoskeletal:         General: No deformity.      Cervical back: Normal range of motion.   Skin:     General: Skin is warm and dry.   Neurological:      Mental Status: She is alert and oriented to person, place, and time.   Psychiatric:         Mood and Affect: Mood normal.         Behavior: Behavior normal.       ???????????????????????????????????????????????????????????????      ED Course/Medical Decision Making:    Diagnoses as of 01/27/24 0927   COPD exacerbation (CMS/[facility])   Type 2 diabetes mellitus with hyperglycemia, unspecified whether long term insulin use (CMS/[facility])        Patient is a 68 year old female presenting with dyspnea. Exam and history is consistent with a COPD exacerbation in the setting of RSV. Low concern for ACS, PE, CHF exacerbation. Patient was started on a duoneb treatment. Steroids were held initially until a glucose was obtained. She was reporting improvement after receiving 3 duoneb treatments. Her lungs had wheezing on reevaluation. Lab work was notable for a hyperglycemia of 447 with no anion gap and normal bicarb lowering concern for DKA. Slight leukocytosis. BNP was elevated at patient's baseline. Chest xray did not show signs of a pneumonia on my  independent review. Patient was discussed with the hospitalist and admitted for COPD exacerbation and placement.    External records reviewed: recent inpatient, clinic, and prior ED notes  Diagnostic imaging independently reviewed/interpreted by me (as reflected in MDM) includes: labs, xray  Social Determinants Affecting Care:   Discussion of management with other providers: ED attending,  hospitalist  Prescription Drug Consideration: duoneb, considered steroids, gave insulin for hyperglycemia  Escalation of Care: considered admission for COPD and placement    Pt was seen and discussed with ED attending     Impression:   COPD exacerbation  Hyperglycemia    Disposition: admit        Procedures ? SmartLinks last updated 1/26/2024 4:12 PM        Susan White MD  Resident  01/27/24 0935

## 2024-01-27 ENCOUNTER — APPOINTMENT (OUTPATIENT)
Dept: CARDIOLOGY | Facility: HOSPITAL | Age: 69
DRG: 192 | End: 2024-01-27
Payer: COMMERCIAL

## 2024-01-27 LAB
ANION GAP SERPL CALC-SCNC: 13 MMOL/L (ref 10–20)
AORTIC VALVE MEAN GRADIENT: 7 MMHG
AORTIC VALVE PEAK VELOCITY: 2 M/S
AV PEAK GRADIENT: 16 MMHG
AVA (PEAK VEL): 1.87 CM2
AVA (VTI): 1.78 CM2
BUN SERPL-MCNC: 23 MG/DL (ref 6–23)
CALCIUM SERPL-MCNC: 8.3 MG/DL (ref 8.6–10.3)
CHLORIDE SERPL-SCNC: 110 MMOL/L (ref 98–107)
CO2 SERPL-SCNC: 25 MMOL/L (ref 21–32)
CREAT SERPL-MCNC: 1.55 MG/DL (ref 0.5–1.05)
EGFRCR SERPLBLD CKD-EPI 2021: 36 ML/MIN/1.73M*2
EJECTION FRACTION APICAL 4 CHAMBER: 69.6
EJECTION FRACTION: 70 %
ERYTHROCYTE [DISTWIDTH] IN BLOOD BY AUTOMATED COUNT: 17.7 % (ref 11.5–14.5)
GLUCOSE BLD MANUAL STRIP-MCNC: 153 MG/DL (ref 74–99)
GLUCOSE BLD MANUAL STRIP-MCNC: 261 MG/DL (ref 74–99)
GLUCOSE BLD MANUAL STRIP-MCNC: 350 MG/DL (ref 74–99)
GLUCOSE BLD MANUAL STRIP-MCNC: 66 MG/DL (ref 74–99)
GLUCOSE BLD MANUAL STRIP-MCNC: 96 MG/DL (ref 74–99)
GLUCOSE SERPL-MCNC: 159 MG/DL (ref 74–99)
HCT VFR BLD AUTO: 36.4 % (ref 36–46)
HGB BLD-MCNC: 12 G/DL (ref 12–16)
LEFT ATRIUM VOLUME AREA LENGTH INDEX BSA: 22.5 ML/M2
LEFT VENTRICLE INTERNAL DIMENSION DIASTOLE: 4.1 CM (ref 3.5–6)
LEFT VENTRICULAR OUTFLOW TRACT DIAMETER: 1.9 CM
MCH RBC QN AUTO: 21.1 PG (ref 26–34)
MCHC RBC AUTO-ENTMCNC: 33 G/DL (ref 32–36)
MCV RBC AUTO: 64 FL (ref 80–100)
MITRAL VALVE E/A RATIO: 0.74
MITRAL VALVE E/E' RATIO: 8.1
NRBC BLD-RTO: 0 /100 WBCS (ref 0–0)
PLATELET # BLD AUTO: 95 X10*3/UL (ref 150–450)
POTASSIUM SERPL-SCNC: 4.1 MMOL/L (ref 3.5–5.3)
RBC # BLD AUTO: 5.68 X10*6/UL (ref 4–5.2)
RIGHT VENTRICLE PEAK SYSTOLIC PRESSURE: 44.7 MMHG
SODIUM SERPL-SCNC: 144 MMOL/L (ref 136–145)
TRICUSPID ANNULAR PLANE SYSTOLIC EXCURSION: 2.1 CM
WBC # BLD AUTO: 12.1 X10*3/UL (ref 4.4–11.3)

## 2024-01-27 PROCEDURE — 93306 TTE W/DOPPLER COMPLETE: CPT | Performed by: INTERNAL MEDICINE

## 2024-01-27 PROCEDURE — 2500000002 HC RX 250 W HCPCS SELF ADMINISTERED DRUGS (ALT 637 FOR MEDICARE OP, ALT 636 FOR OP/ED): Performed by: PHYSICIAN ASSISTANT

## 2024-01-27 PROCEDURE — 82728 ASSAY OF FERRITIN: CPT | Mod: AHULAB | Performed by: HOSPITALIST

## 2024-01-27 PROCEDURE — G0378 HOSPITAL OBSERVATION PER HR: HCPCS

## 2024-01-27 PROCEDURE — 36415 COLL VENOUS BLD VENIPUNCTURE: CPT | Performed by: PHYSICIAN ASSISTANT

## 2024-01-27 PROCEDURE — 2500000002 HC RX 250 W HCPCS SELF ADMINISTERED DRUGS (ALT 637 FOR MEDICARE OP, ALT 636 FOR OP/ED): Performed by: STUDENT IN AN ORGANIZED HEALTH CARE EDUCATION/TRAINING PROGRAM

## 2024-01-27 PROCEDURE — 99232 SBSQ HOSP IP/OBS MODERATE 35: CPT | Performed by: HOSPITALIST

## 2024-01-27 PROCEDURE — 2500000001 HC RX 250 WO HCPCS SELF ADMINISTERED DRUGS (ALT 637 FOR MEDICARE OP): Performed by: PHYSICIAN ASSISTANT

## 2024-01-27 PROCEDURE — 2500000004 HC RX 250 GENERAL PHARMACY W/ HCPCS (ALT 636 FOR OP/ED): Performed by: PHYSICIAN ASSISTANT

## 2024-01-27 PROCEDURE — 2500000002 HC RX 250 W HCPCS SELF ADMINISTERED DRUGS (ALT 637 FOR MEDICARE OP, ALT 636 FOR OP/ED): Performed by: HOSPITALIST

## 2024-01-27 PROCEDURE — 82947 ASSAY GLUCOSE BLOOD QUANT: CPT

## 2024-01-27 PROCEDURE — 94640 AIRWAY INHALATION TREATMENT: CPT

## 2024-01-27 PROCEDURE — 1200000002 HC GENERAL ROOM WITH TELEMETRY DAILY

## 2024-01-27 PROCEDURE — 93306 TTE W/DOPPLER COMPLETE: CPT

## 2024-01-27 PROCEDURE — 85027 COMPLETE CBC AUTOMATED: CPT | Performed by: PHYSICIAN ASSISTANT

## 2024-01-27 PROCEDURE — 80048 BASIC METABOLIC PNL TOTAL CA: CPT | Performed by: PHYSICIAN ASSISTANT

## 2024-01-27 RX ORDER — INSULIN LISPRO 100 [IU]/ML
6 INJECTION, SOLUTION INTRAVENOUS; SUBCUTANEOUS
Status: DISCONTINUED | OUTPATIENT
Start: 2024-01-28 | End: 2024-01-29

## 2024-01-27 RX ORDER — ASPIRIN 81 MG/1
81 TABLET ORAL DAILY
Status: DISCONTINUED | OUTPATIENT
Start: 2024-01-27 | End: 2024-01-29 | Stop reason: HOSPADM

## 2024-01-27 RX ORDER — FLUTICASONE FUROATE AND VILANTEROL 200; 25 UG/1; UG/1
1 POWDER RESPIRATORY (INHALATION)
Status: DISCONTINUED | OUTPATIENT
Start: 2024-01-27 | End: 2024-01-29 | Stop reason: HOSPADM

## 2024-01-27 RX ORDER — METOPROLOL TARTRATE 50 MG/1
50 TABLET ORAL 2 TIMES DAILY
Status: DISCONTINUED | OUTPATIENT
Start: 2024-01-27 | End: 2024-01-29 | Stop reason: HOSPADM

## 2024-01-27 RX ORDER — INSULIN LISPRO 100 [IU]/ML
10 INJECTION, SOLUTION INTRAVENOUS; SUBCUTANEOUS
Status: DISCONTINUED | OUTPATIENT
Start: 2024-01-27 | End: 2024-01-27

## 2024-01-27 RX ORDER — AMLODIPINE BESYLATE 10 MG/1
10 TABLET ORAL DAILY
Status: DISCONTINUED | OUTPATIENT
Start: 2024-01-27 | End: 2024-01-29 | Stop reason: HOSPADM

## 2024-01-27 RX ORDER — ROSUVASTATIN CALCIUM 10 MG/1
10 TABLET, COATED ORAL NIGHTLY
Status: DISCONTINUED | OUTPATIENT
Start: 2024-01-27 | End: 2024-01-29 | Stop reason: HOSPADM

## 2024-01-27 RX ADMIN — INSULIN LISPRO 6 UNITS: 100 INJECTION, SOLUTION INTRAVENOUS; SUBCUTANEOUS at 12:04

## 2024-01-27 RX ADMIN — INSULIN LISPRO 2 UNITS: 100 INJECTION, SOLUTION INTRAVENOUS; SUBCUTANEOUS at 10:32

## 2024-01-27 RX ADMIN — IPRATROPIUM BROMIDE AND ALBUTEROL SULFATE 3 ML: 2.5; .5 SOLUTION RESPIRATORY (INHALATION) at 14:33

## 2024-01-27 RX ADMIN — SENNOSIDES AND DOCUSATE SODIUM 2 TABLET: 8.6; 5 TABLET ORAL at 21:49

## 2024-01-27 RX ADMIN — INSULIN HUMAN 15 UNITS: 100 INJECTION, SUSPENSION SUBCUTANEOUS at 10:31

## 2024-01-27 RX ADMIN — PREDNISONE 40 MG: 20 TABLET ORAL at 10:30

## 2024-01-27 RX ADMIN — IPRATROPIUM BROMIDE AND ALBUTEROL SULFATE 3 ML: 2.5; .5 SOLUTION RESPIRATORY (INHALATION) at 07:28

## 2024-01-27 RX ADMIN — INSULIN LISPRO 10 UNITS: 100 INJECTION, SOLUTION INTRAVENOUS; SUBCUTANEOUS at 10:30

## 2024-01-27 RX ADMIN — PANTOPRAZOLE SODIUM 40 MG: 40 TABLET, DELAYED RELEASE ORAL at 06:24

## 2024-01-27 RX ADMIN — Medication 3 MG: at 21:50

## 2024-01-27 RX ADMIN — IPRATROPIUM BROMIDE AND ALBUTEROL SULFATE 3 ML: 2.5; .5 SOLUTION RESPIRATORY (INHALATION) at 19:34

## 2024-01-27 RX ADMIN — ASPIRIN 81 MG: 81 TABLET, COATED ORAL at 16:20

## 2024-01-27 RX ADMIN — INSULIN LISPRO 10 UNITS: 100 INJECTION, SOLUTION INTRAVENOUS; SUBCUTANEOUS at 12:04

## 2024-01-27 RX ADMIN — AMLODIPINE BESYLATE 10 MG: 10 TABLET ORAL at 16:20

## 2024-01-27 RX ADMIN — SENNOSIDES AND DOCUSATE SODIUM 2 TABLET: 8.6; 5 TABLET ORAL at 10:30

## 2024-01-27 RX ADMIN — METOPROLOL TARTRATE 50 MG: 50 TABLET, FILM COATED ORAL at 16:20

## 2024-01-27 RX ADMIN — ENOXAPARIN SODIUM 30 MG: 30 INJECTION SUBCUTANEOUS at 21:48

## 2024-01-27 RX ADMIN — ROSUVASTATIN 10 MG: 10 TABLET, FILM COATED ORAL at 21:49

## 2024-01-27 ASSESSMENT — COGNITIVE AND FUNCTIONAL STATUS - GENERAL
DAILY ACTIVITIY SCORE: 24
MOBILITY SCORE: 24
MOBILITY SCORE: 24
DAILY ACTIVITIY SCORE: 24

## 2024-01-27 ASSESSMENT — PAIN SCALES - GENERAL
PAINLEVEL_OUTOF10: 0 - NO PAIN
PAINLEVEL_OUTOF10: 0 - NO PAIN

## 2024-01-27 NOTE — CARE PLAN
The patient's goals for the shift include      The clinical goals for the shift include pt will remain pain free      Problem: Pain - Adult  Goal: Verbalizes/displays adequate comfort level or baseline comfort level  Outcome: Progressing     Problem: Safety - Adult  Goal: Free from fall injury  Outcome: Progressing     Problem: Discharge Planning  Goal: Discharge to home or other facility with appropriate resources  Outcome: Progressing     Problem: Chronic Conditions and Co-morbidities  Goal: Patient's chronic conditions and co-morbidity symptoms are monitored and maintained or improved  Outcome: Progressing     Problem: Diabetes  Goal: Achieve decreasing blood glucose levels by end of shift  Outcome: Progressing  Goal: Increase stability of blood glucose readings by end of shift  Outcome: Progressing  Goal: Decrease in ketones present in urine by end of shift  Outcome: Progressing  Goal: Maintain electrolyte levels within acceptable range throughout shift  Outcome: Progressing  Goal: Maintain glucose levels >70mg/dl to <250mg/dl throughout shift  Outcome: Progressing  Goal: No changes in neurological exam by end of shift  Outcome: Progressing  Goal: Learn about and adhere to nutrition recommendations by end of shift  Outcome: Progressing  Goal: Vital signs within normal range for age by end of shift  Outcome: Progressing  Goal: Increase self care and/or family involovement by end of shift  Outcome: Progressing  Goal: Receive DSME education by end of shift  Outcome: Progressing

## 2024-01-28 LAB
ANION GAP SERPL CALC-SCNC: 11 MMOL/L (ref 10–20)
BUN SERPL-MCNC: 24 MG/DL (ref 6–23)
CALCIUM SERPL-MCNC: 8 MG/DL (ref 8.6–10.3)
CHLORIDE SERPL-SCNC: 106 MMOL/L (ref 98–107)
CO2 SERPL-SCNC: 24 MMOL/L (ref 21–32)
CREAT SERPL-MCNC: 1.53 MG/DL (ref 0.5–1.05)
EGFRCR SERPLBLD CKD-EPI 2021: 37 ML/MIN/1.73M*2
ERYTHROCYTE [DISTWIDTH] IN BLOOD BY AUTOMATED COUNT: 17.3 % (ref 11.5–14.5)
GLUCOSE BLD MANUAL STRIP-MCNC: 143 MG/DL (ref 74–99)
GLUCOSE BLD MANUAL STRIP-MCNC: 166 MG/DL (ref 74–99)
GLUCOSE BLD MANUAL STRIP-MCNC: 196 MG/DL (ref 74–99)
GLUCOSE BLD MANUAL STRIP-MCNC: 333 MG/DL (ref 74–99)
GLUCOSE SERPL-MCNC: 364 MG/DL (ref 74–99)
HCT VFR BLD AUTO: 33.3 % (ref 36–46)
HGB BLD-MCNC: 11 G/DL (ref 12–16)
IRON SATN MFR SERPL: 28 % (ref 25–45)
IRON SERPL-MCNC: 72 UG/DL (ref 35–150)
MCH RBC QN AUTO: 21 PG (ref 26–34)
MCHC RBC AUTO-ENTMCNC: 33 G/DL (ref 32–36)
MCV RBC AUTO: 63 FL (ref 80–100)
NRBC BLD-RTO: 0 /100 WBCS (ref 0–0)
PLATELET # BLD AUTO: 91 X10*3/UL (ref 150–450)
POTASSIUM SERPL-SCNC: 4.4 MMOL/L (ref 3.5–5.3)
RBC # BLD AUTO: 5.25 X10*6/UL (ref 4–5.2)
SODIUM SERPL-SCNC: 137 MMOL/L (ref 136–145)
TIBC SERPL-MCNC: 253 UG/DL (ref 240–445)
UIBC SERPL-MCNC: 181 UG/DL (ref 110–370)
WBC # BLD AUTO: 14.1 X10*3/UL (ref 4.4–11.3)

## 2024-01-28 PROCEDURE — 2500000002 HC RX 250 W HCPCS SELF ADMINISTERED DRUGS (ALT 637 FOR MEDICARE OP, ALT 636 FOR OP/ED): Performed by: HOSPITALIST

## 2024-01-28 PROCEDURE — 1200000002 HC GENERAL ROOM WITH TELEMETRY DAILY

## 2024-01-28 PROCEDURE — G0378 HOSPITAL OBSERVATION PER HR: HCPCS

## 2024-01-28 PROCEDURE — 36415 COLL VENOUS BLD VENIPUNCTURE: CPT | Performed by: PHYSICIAN ASSISTANT

## 2024-01-28 PROCEDURE — 99221 1ST HOSP IP/OBS SF/LOW 40: CPT | Performed by: INTERNAL MEDICINE

## 2024-01-28 PROCEDURE — 2500000004 HC RX 250 GENERAL PHARMACY W/ HCPCS (ALT 636 FOR OP/ED): Performed by: PHYSICIAN ASSISTANT

## 2024-01-28 PROCEDURE — 80048 BASIC METABOLIC PNL TOTAL CA: CPT | Performed by: PHYSICIAN ASSISTANT

## 2024-01-28 PROCEDURE — 94640 AIRWAY INHALATION TREATMENT: CPT

## 2024-01-28 PROCEDURE — 2500000001 HC RX 250 WO HCPCS SELF ADMINISTERED DRUGS (ALT 637 FOR MEDICARE OP): Performed by: PHYSICIAN ASSISTANT

## 2024-01-28 PROCEDURE — 85027 COMPLETE CBC AUTOMATED: CPT | Performed by: PHYSICIAN ASSISTANT

## 2024-01-28 PROCEDURE — 99232 SBSQ HOSP IP/OBS MODERATE 35: CPT | Performed by: HOSPITALIST

## 2024-01-28 PROCEDURE — 2500000002 HC RX 250 W HCPCS SELF ADMINISTERED DRUGS (ALT 637 FOR MEDICARE OP, ALT 636 FOR OP/ED): Performed by: PHYSICIAN ASSISTANT

## 2024-01-28 PROCEDURE — 82947 ASSAY GLUCOSE BLOOD QUANT: CPT

## 2024-01-28 PROCEDURE — 99232 SBSQ HOSP IP/OBS MODERATE 35: CPT | Performed by: INTERNAL MEDICINE

## 2024-01-28 PROCEDURE — 2500000002 HC RX 250 W HCPCS SELF ADMINISTERED DRUGS (ALT 637 FOR MEDICARE OP, ALT 636 FOR OP/ED): Performed by: STUDENT IN AN ORGANIZED HEALTH CARE EDUCATION/TRAINING PROGRAM

## 2024-01-28 PROCEDURE — 97161 PT EVAL LOW COMPLEX 20 MIN: CPT | Mod: GP

## 2024-01-28 PROCEDURE — 83540 ASSAY OF IRON: CPT | Performed by: HOSPITALIST

## 2024-01-28 RX ADMIN — INSULIN LISPRO 6 UNITS: 100 INJECTION, SOLUTION INTRAVENOUS; SUBCUTANEOUS at 12:36

## 2024-01-28 RX ADMIN — METOPROLOL TARTRATE 50 MG: 50 TABLET, FILM COATED ORAL at 21:55

## 2024-01-28 RX ADMIN — ENOXAPARIN SODIUM 30 MG: 30 INJECTION SUBCUTANEOUS at 21:55

## 2024-01-28 RX ADMIN — INSULIN LISPRO 6 UNITS: 100 INJECTION, SOLUTION INTRAVENOUS; SUBCUTANEOUS at 08:29

## 2024-01-28 RX ADMIN — SENNOSIDES AND DOCUSATE SODIUM 2 TABLET: 8.6; 5 TABLET ORAL at 21:56

## 2024-01-28 RX ADMIN — PANTOPRAZOLE SODIUM 40 MG: 40 TABLET, DELAYED RELEASE ORAL at 06:01

## 2024-01-28 RX ADMIN — INSULIN LISPRO 2 UNITS: 100 INJECTION, SOLUTION INTRAVENOUS; SUBCUTANEOUS at 12:39

## 2024-01-28 RX ADMIN — ASPIRIN 81 MG: 81 TABLET, COATED ORAL at 08:29

## 2024-01-28 RX ADMIN — INSULIN HUMAN 15 UNITS: 100 INJECTION, SUSPENSION SUBCUTANEOUS at 16:57

## 2024-01-28 RX ADMIN — IPRATROPIUM BROMIDE AND ALBUTEROL SULFATE 3 ML: 2.5; .5 SOLUTION RESPIRATORY (INHALATION) at 07:24

## 2024-01-28 RX ADMIN — ROSUVASTATIN 10 MG: 10 TABLET, FILM COATED ORAL at 21:55

## 2024-01-28 RX ADMIN — INSULIN LISPRO 10 UNITS: 100 INJECTION, SOLUTION INTRAVENOUS; SUBCUTANEOUS at 08:32

## 2024-01-28 RX ADMIN — INSULIN LISPRO 6 UNITS: 100 INJECTION, SOLUTION INTRAVENOUS; SUBCUTANEOUS at 16:57

## 2024-01-28 RX ADMIN — AMLODIPINE BESYLATE 10 MG: 10 TABLET ORAL at 08:29

## 2024-01-28 RX ADMIN — IPRATROPIUM BROMIDE AND ALBUTEROL SULFATE 3 ML: 2.5; .5 SOLUTION RESPIRATORY (INHALATION) at 19:09

## 2024-01-28 RX ADMIN — IPRATROPIUM BROMIDE AND ALBUTEROL SULFATE 3 ML: 2.5; .5 SOLUTION RESPIRATORY (INHALATION) at 13:40

## 2024-01-28 RX ADMIN — INSULIN HUMAN 15 UNITS: 100 INJECTION, SUSPENSION SUBCUTANEOUS at 08:29

## 2024-01-28 RX ADMIN — SENNOSIDES AND DOCUSATE SODIUM 2 TABLET: 8.6; 5 TABLET ORAL at 08:29

## 2024-01-28 RX ADMIN — FLUTICASONE FUROATE AND VILANTEROL TRIFENATATE 1 PUFF: 200; 25 POWDER RESPIRATORY (INHALATION) at 07:23

## 2024-01-28 RX ADMIN — METOPROLOL TARTRATE 50 MG: 50 TABLET, FILM COATED ORAL at 08:29

## 2024-01-28 ASSESSMENT — COGNITIVE AND FUNCTIONAL STATUS - GENERAL
MOBILITY SCORE: 22
WALKING IN HOSPITAL ROOM: A LITTLE
DAILY ACTIVITIY SCORE: 24
DAILY ACTIVITIY SCORE: 24
MOBILITY SCORE: 24
MOBILITY SCORE: 24
CLIMB 3 TO 5 STEPS WITH RAILING: A LITTLE

## 2024-01-28 ASSESSMENT — PAIN SCALES - GENERAL
PAINLEVEL_OUTOF10: 0 - NO PAIN

## 2024-01-28 ASSESSMENT — ENCOUNTER SYMPTOMS
ENDOCRINE COMMENTS: AS ABOVE
SHORTNESS OF BREATH: 0

## 2024-01-28 ASSESSMENT — PAIN - FUNCTIONAL ASSESSMENT: PAIN_FUNCTIONAL_ASSESSMENT: 0-10

## 2024-01-28 ASSESSMENT — ACTIVITIES OF DAILY LIVING (ADL): ADL_ASSISTANCE: INDEPENDENT

## 2024-01-28 NOTE — PROGRESS NOTES
[facility] Hospitalist Progress Note        Yesy Ayala    :  1955(68 y.o.)    MRN:  [id number]  Date: 24     Assessment and Plan:     Mild AECOPD  T2DM with hyperglycemia and hypoglycemia  CKD 3  HTN  HLD  Recent RSV infection  Thrombocytopenia  Microcytic anemia    Plan:  - Irish inhalers/nebs. Hold on further steroids given hyperglycemia. Suspect symptoms worsened as she was not taking home inhalers  - Restarted insulin as recommended by endocrine last admission but developed hypoglycemia. Reduce humalog to 6 units TID AC. Continue NPH 15 units bid (dose held this evening as BG was 66).   - new start insulin and does not know how to inject insulin; consult pharmacy to assist with teaching  - b12 low, start daily supplement.   - given severe microcytosis check ferritin, iron studies  - continue norvasc, metoprolol  - continue asa, statin  - PT evaluated and does not need therapy      DVT Prophylaxis: subcutaneous Lovenox    Disposition: home pending improvement in BG and insulin teaching    The patient/family had opportunity to ask questions. All questions were answered to the best of my ability.    Between 7AM-7PM please message me via Epic Secure Chat.  After 7PM please page Nocturnist on call.    Electronically signed by Urban Hardy DO on 24 at 1:57 PM     Subjective:      Interval History:   Vitals and chart notes from overnight reviewed.   No acute issues overnight.   Patient seen and evaluated at bedside.     Shortness of breath improved with restarting inhalers/nebs. Able to ambulate to bathroom and notice she is not shortness of breath like she was prior to admit.    Review of Systems:   Other than patient's chronic conditions and those complaints in the history above, the rest of the 10 systems review were done and were negative.     Current medications:  Scheduled Meds:amLODIPine, 10 mg, oral, Daily  aspirin, 81 mg, oral, Daily  enoxaparin, 30 mg, subcutaneous, q24h  fluticasone  furoate-vilanteroL, 1 puff, inhalation, Daily  insulin lispro, 0-10 Units, subcutaneous, TID with meals  insulin lispro, 6 Units, subcutaneous, TID with meals  insulin NPH (Isophane), 15 Units, subcutaneous, BID AC  ipratropium-albuteroL, 3 mL, nebulization, TID  melatonin, 3 mg, oral, Daily  metoprolol tartrate, 50 mg, oral, BID  pantoprazole, 40 mg, oral, Daily before breakfast   Or  pantoprazole, 40 mg, intravenous, Daily before breakfast  perflutren lipid microspheres, 0.5-10 mL of dilution, intravenous, Once in imaging  perflutren protein A microsphere, 0.5 mL, intravenous, Once in imaging  [Held by provider] predniSONE, 40 mg, oral, Daily  rosuvastatin, 10 mg, oral, Nightly  sennosides-docusate sodium, 2 tablet, oral, BID  sulfur hexafluoride microsphr, 2 mL, intravenous, Once in imaging      Continuous Infusions:   PRN Meds:PRN medications: albuterol, dextrose 10 % in water (D10W), dextrose, glucagon, ondansetron ODT **OR** ondansetron, oxygen      Objective:     Vitals:    01/28/24 0724 01/28/24 0738 01/28/24 1154 01/28/24 1340   BP:  142/64 111/52    BP Location:  Left arm Left arm    Patient Position:  Lying Lying    Pulse:  87 82    Resp:  18 18    Temp:  36.4 °C (97.6 °F)     TempSrc:  Oral     SpO2: 97% 92% 100% 100%   Weight:       Height:            Physical Exam  Vitals and nursing note reviewed.   HENT:      Mouth/Throat:      Mouth: Mucous membranes are moist.      Pharynx: Oropharynx is clear.   Cardiovascular:      Rate and Rhythm: Normal rate and regular rhythm.   Pulmonary:      Effort: Pulmonary effort is normal.      Breath sounds: No wheezing.   Abdominal:      Palpations: Abdomen is soft.   Neurological:      Mental Status: She is alert.         Labs:   Lab Results   Component Value Date     01/28/2024    K 4.4 01/28/2024     01/28/2024    CO2 24 01/28/2024    BUN 24 (H) 01/28/2024    CREATININE 1.53 (H) 01/28/2024    GLUCOSE 364 (H) 01/28/2024    CALCIUM 8.0 (L) 01/28/2024     PROT 5.5 (L) 01/26/2024    BILITOT 0.7 01/26/2024    ALKPHOS 77 01/26/2024    AST 17 01/26/2024    ALT 30 01/26/2024       Lab Results   Component Value Date    WBC 14.1 (H) 01/28/2024    HGB 11.0 (L) 01/28/2024    HCT 33.3 (L) 01/28/2024    MCV 63 (L) 01/28/2024    PLT 91 (L) 01/28/2024

## 2024-01-28 NOTE — PROGRESS NOTES
[facility] Hospitalist Progress Note        Yesy Ayala    :  1955(68 y.o.)    MRN:  [id number]  Date: 24     Assessment and Plan:     Mild AECOPD  T2DM with hyperglycemia and hypoglycemia  CKD 3  HTN  HLD  Recent RSV infection  Thrombocytopenia  Microcytosis without anemia    Plan:  - Irish inhalers/nebs. Hold on further steroids given hyperglycemia. Suspect symptoms worsened as she was not taking home inhalers  - Restarted insulin as recommended by endocrine last admission but developed hypoglycemia. Reduce humalog to 6 units TID AC. Continue NPH 15 units bid (dose held this evening as BG was 66).   - continue norvasc, metoprolol  - continue asa, statin        DVT Prophylaxis: subcutaneous Lovenox    Disposition: continue to monitor inpatient, await test results, and await clinical improvement    The patient/family had opportunity to ask questions. All questions were answered to the best of my ability.    Between 7AM-7PM please message me via Epic Secure Wave - Private Location App.  After 7PM please page Nocturnist on call.    Electronically signed by Urban Hardy DO on 24 at 10:44 PM     Subjective:      Interval History:   Vitals and chart notes from overnight reviewed.   No acute issues overnight.   Patient seen and evaluated at bedside.     Shortness of breath improved with restarting inhalers/nebs.     Review of Systems:   Other than patient's chronic conditions and those complaints in the history above, the rest of the 10 systems review were done and were negative.     Current medications:  Scheduled Meds:amLODIPine, 10 mg, oral, Daily  aspirin, 81 mg, oral, Daily  enoxaparin, 30 mg, subcutaneous, q24h  fluticasone furoate-vilanteroL, 1 puff, inhalation, Daily  insulin lispro, 0-10 Units, subcutaneous, TID with meals  [START ON 2024] insulin lispro, 6 Units, subcutaneous, TID with meals  insulin NPH (Isophane), 15 Units, subcutaneous, BID AC  ipratropium-albuteroL, 3 mL, nebulization, TID  melatonin, 3 mg, oral,  Daily  metoprolol tartrate, 50 mg, oral, BID  pantoprazole, 40 mg, oral, Daily before breakfast   Or  pantoprazole, 40 mg, intravenous, Daily before breakfast  perflutren lipid microspheres, 0.5-10 mL of dilution, intravenous, Once in imaging  perflutren protein A microsphere, 0.5 mL, intravenous, Once in imaging  predniSONE, 40 mg, oral, Daily  rosuvastatin, 10 mg, oral, Nightly  sennosides-docusate sodium, 2 tablet, oral, BID  sulfur hexafluoride microsphr, 2 mL, intravenous, Once in imaging      Continuous Infusions:   PRN Meds:PRN medications: albuterol, dextrose 10 % in water (D10W), dextrose, glucagon, ondansetron ODT **OR** ondansetron, oxygen      Objective:     Vitals:    01/27/24 1433 01/27/24 1539 01/27/24 1908 01/27/24 1934   BP:  135/66 111/68    BP Location:  Left arm Right arm    Patient Position:  Sitting Lying    Pulse:  102 78    Resp:  18 18    Temp:   36.9 °C (98.5 °F)    TempSrc:   Oral    SpO2: 97% 98% 97% 100%   Weight:       Height:            Physical Exam  Vitals and nursing note reviewed.   HENT:      Mouth/Throat:      Mouth: Mucous membranes are moist.      Pharynx: Oropharynx is clear.   Cardiovascular:      Rate and Rhythm: Normal rate and regular rhythm.   Pulmonary:      Effort: Pulmonary effort is normal.   Abdominal:      Palpations: Abdomen is soft.   Neurological:      Mental Status: She is alert.         Labs:   Lab Results   Component Value Date     01/27/2024    K 4.1 01/27/2024     (H) 01/27/2024    CO2 25 01/27/2024    BUN 23 01/27/2024    CREATININE 1.55 (H) 01/27/2024    GLUCOSE 159 (H) 01/27/2024    CALCIUM 8.3 (L) 01/27/2024    PROT 5.5 (L) 01/26/2024    BILITOT 0.7 01/26/2024    ALKPHOS 77 01/26/2024    AST 17 01/26/2024    ALT 30 01/26/2024       Lab Results   Component Value Date    WBC 12.1 (H) 01/27/2024    HGB 12.0 01/27/2024    HCT 36.4 01/27/2024    MCV 64 (L) 01/27/2024    PLT 95 (L) 01/27/2024

## 2024-01-28 NOTE — CONSULTS
Inpatient consult to Endocrinology  Consult performed by: Juan Manuel العلي MD  Consult ordered by: Urban Hardy DO      Reason For Consult  Diabetes    History Of Present Illness  Yesy Ayala is a 68 y.o. female     Duration of type 2 diabetes mellitus:  4 years  Complications:  none    Patient discharged 3 days ago on:  NPH 18 units BID  Lispro 10 units QAC plus corrective scale    She returned 24h later with COPD exacerbation.     Medications    Current Facility-Administered Medications:     albuterol 2.5 mg /3 mL (0.083 %) nebulizer solution 2.5 mg, 2.5 mg, nebulization, q4h PRN, Josephine Amezcua PA-C    amLODIPine (Norvasc) tablet 10 mg, 10 mg, oral, Daily, Josephine Amezcua PA-C, 10 mg at 01/28/24 0829    aspirin EC tablet 81 mg, 81 mg, oral, Daily, Josephine Amezcua PA-C, 81 mg at 01/28/24 0829    dextrose 10 % in water (D10W) infusion, 0.3 g/kg/hr, intravenous, Once PRN, Josephine Amezcua PA-C    dextrose 50 % injection 25 g, 25 g, intravenous, q15 min PRN, Josephine Amezcua PA-C    enoxaparin (Lovenox) syringe 30 mg, 30 mg, subcutaneous, q24h, Josephine Amezcua PA-C, 30 mg at 01/27/24 2148    fluticasone furoate-vilanteroL (Breo Ellipta) 200-25 mcg/dose inhaler 1 puff, 1 puff, inhalation, Daily, Josephine Amezcua PA-C, 1 puff at 01/28/24 0723    glucagon (Glucagen) injection 1 mg, 1 mg, intramuscular, q15 min PRN, Josephine Amezcua PA-C    insulin lispro (HumaLOG) injection 0-10 Units, 0-10 Units, subcutaneous, TID with meals, Josephine Amezcua PA-C, 2 Units at 01/28/24 1239    insulin lispro (HumaLOG) injection 6 Units, 6 Units, subcutaneous, TID with meals, Urban Hardy DO, 6 Units at 01/28/24 1236    insulin NPH (Isophane) (HumuLIN N,NovoLIN N) injection 15 Units, 15 Units, subcutaneous, BID AC, Deep Antony, DO, 15 Units at 01/28/24 0829    ipratropium-albuteroL (Duo-Neb) 0.5-2.5 mg/3 mL nebulizer solution 3 mL, 3 mL, nebulization, TID, Everardo Rajan MD, 3 mL at 01/28/24 1340    melatonin tablet 3 mg, 3 mg,  oral, Daily, Josephine Amezcua PA-C, 3 mg at 01/27/24 2150    metoprolol tartrate (Lopressor) tablet 50 mg, 50 mg, oral, BID, Josephine Amezcua PA-C, 50 mg at 01/28/24 0829    ondansetron ODT (Zofran-ODT) disintegrating tablet 4 mg, 4 mg, oral, q8h PRN **OR** ondansetron (Zofran) injection 4 mg, 4 mg, intravenous, q8h PRN, Josephine Amezcua PA-C    oxygen (O2) therapy, , inhalation, Continuous PRN - O2/gases, Josephine Amezcua PA-C    pantoprazole (ProtoNix) EC tablet 40 mg, 40 mg, oral, Daily before breakfast, 40 mg at 01/28/24 0601 **OR** pantoprazole (ProtoNix) injection 40 mg, 40 mg, intravenous, Daily before breakfast, Josephine Amezcua PA-C    perflutren lipid microspheres (Definity) injection 0.5-10 mL of dilution, 0.5-10 mL of dilution, intravenous, Once in imaging, Josephine Amezcua PA-C    perflutren protein A microsphere (Optison) injection 0.5 mL, 0.5 mL, intravenous, Once in imaging, Josephine Amezcua PA-C    [Held by provider] predniSONE (Deltasone) tablet 40 mg, 40 mg, oral, Daily, Josephine Amezcua PA-C, 40 mg at 01/27/24 1030    rosuvastatin (Crestor) tablet 10 mg, 10 mg, oral, Nightly, Josephine Amezcua PA-C, 10 mg at 01/27/24 2149    sennosides-docusate sodium (Candida-Colace) 8.6-50 mg per tablet 2 tablet, 2 tablet, oral, BID, Josephine Amezcua PA-C, 2 tablet at 01/28/24 0829    sulfur hexafluoride microsphr (Lumason) injection 24.28 mg, 2 mL, intravenous, Once in imaging, Josephine Amezcua PA-C     Past Medical History  She has a past medical history of Body mass index (BMI) 27.0-27.9, adult (01/18/2020), Other conditions influencing health status, Personal history of other diseases of the circulatory system, Personal history of other diseases of the circulatory system, Personal history of other diseases of the circulatory system, and Personal history of other endocrine, nutritional and metabolic disease.    Surgical History  She has a past surgical history that includes Other surgical history (08/22/2013);  "Other surgical history (08/22/2013); and Other surgical history (02/20/2014).     Social History  She reports that she has quit smoking. Her smoking use included cigarettes. She has a 1.00 pack-year smoking history. She has never been exposed to tobacco smoke. She has never used smokeless tobacco. She reports that she does not currently use drugs. She reports that she does not drink alcohol.    Family History  No family history on file.    Allergies  Penicillin, Metformin, and Sulfa (sulfonamide antibiotics)    Review of Systems   Respiratory:  Negative for shortness of breath.    Endocrine:        As above         Last Recorded Vitals  Blood pressure 111/52, pulse 82, temperature 36.4 °C (97.6 °F), temperature source Oral, resp. rate 18, height 1.499 m (4' 11\"), weight 57.6 kg (126 lb 15.8 oz), SpO2 100 %.    Physical Exam  Constitutional:       General: She is not in acute distress.  HENT:      Head: Normocephalic.   Eyes:      Extraocular Movements: Extraocular movements intact.   Cardiovascular:      Pulses:           Radial pulses are 2+ on the right side and 2+ on the left side.   Musculoskeletal:      Right lower leg: No edema.      Left lower leg: No edema.   Neurological:      Mental Status: She is alert.      Motor: No tremor.   Psychiatric:         Mood and Affect: Affect normal.          Relevant Results  Lab Results   Component Value Date    POCGLU 196 (H) 01/28/2024    POCGLU 333 (H) 01/28/2024    POCGLU 350 (H) 01/27/2024    POCGLU 96 01/27/2024    POCGLU 66 (L) 01/27/2024    GLUCOSE 364 (H) 01/28/2024    GLUCOSE 159 (H) 01/27/2024    GLUCOSE 447 (H) 01/26/2024    GLUCOSE 131 (H) 01/24/2024    GLUCOSE 61 (L) 01/23/2024      Latest Reference Range & Units 01/28/24 06:53   GLUCOSE 74 - 99 mg/dL 364 (H)   SODIUM 136 - 145 mmol/L 137   POTASSIUM 3.5 - 5.3 mmol/L 4.4   CHLORIDE 98 - 107 mmol/L 106   Bicarbonate 21 - 32 mmol/L 24   Anion Gap 10 - 20 mmol/L 11   Blood Urea Nitrogen 6 - 23 mg/dL 24 (H) "   Creatinine 0.50 - 1.05 mg/dL 1.53 (H)   EGFR >60 mL/min/1.73m*2 37 (L)   Calcium 8.6 - 10.3 mg/dL 8.0 (L)         IMPRESSION  TYPE 2 DIABETES MELLITUS WITH HYPERGLYCEMIA  Longstanding hyperglycemia, further exacerbated by glucocorticoid      RECOMMENDATIONS  Continue NPH 15 units BID  Lispro 6 units QAC plus corrective scale  Advised she will need insulin as long as she needs steroid  If discharged home, will recommend a premixed insulin.        Juan Manuel العلي MD

## 2024-01-28 NOTE — PROGRESS NOTES
Occupational Therapy                    Therapy Communication Note    Patient Name: Yesy Ayala  MRN: [id number]  Today's Date: 1/28/2024     Discipline: Occupational Therapy    Screen & Discharge: Per PT and RN, patient functioning at baseline completing ADLs and funcitonal transfers independently with no acute OT needs at this time. Will D/C OT.

## 2024-01-28 NOTE — CARE PLAN
The patient's goals for the shift include      The clinical goals for the shift include pt will remain clinically stable and safe

## 2024-01-28 NOTE — PROGRESS NOTES
Physical Therapy    Physical Therapy Evaluation    Patient Name: Yesy Ayala  MRN: [id number]  Today's Date: 1/28/2024   Time Calculation  Start Time: 0822  Stop Time: 0831  Time Calculation (min): 9 min    Assessment/Plan   PT Assessment  PT Assessment Results: Decreased endurance  Rehab Prognosis: Excellent  Barriers to Discharge: none  Evaluation/Treatment Tolerance: Patient limited by fatigue  Medical Staff Made Aware: Yes  Strengths: Ability to acquire knowledge, Premorbid level of function  Barriers to Participation: Other (Comment) (Pt refusing futher PT intervention at this time.)  End of Session Communication: Bedside nurse  Assessment Comment: Pt presenting with impaired endurance and SOB with minimal exertion. Pt is otherwise independent with all mobility and Pt wishes to not continue with PT at this time. PT to discharge order  End of Session Patient Position: Up in chair, Alarm off, not on at start of session  IP OR SWING BED PT PLAN  Inpatient or Swing Bed: Inpatient  PT Plan  PT Plan: PT Eval only  PT Eval Only Reason: Patient/family refusal  PT Frequency: PT eval only  PT Discharge Recommendations: No further acute PT, No PT needed after discharge  PT Recommended Transfer Status: Independent  PT - OK to Discharge: Yes (No further PT follow up required)      Subjective   General Visit Information:  General  Reason for Referral: 68 year old female admitted with SOB and diagnosed with RSV  Referred By: Goldie (YUNIOR)  Past Medical History Relevant to Rehab: COPD, DM, HLD, HTN, PVD, CAD  Family/Caregiver Present: No  Prior to Session Communication: Bedside nurse  Patient Position Received: Bed, 3 rail up, Alarm off, not on at start of session  Preferred Learning Style: auditory, verbal  General Comment: Pt received supine lying, telemetry and no apparent distress. Pt agreeable to PT.  Home Living:  Home Living  Type of Home: Apartment  Lives With: Alone  Home Adaptive Equipment: None  Home Layout: One  level  Home Access: Stairs to enter with rails (Pt on second floor. 2 flights of stairs to get to apartment with rail on right as going in.)  Entrance Stairs-Rails: Right  Entrance Stairs-Number of Steps: 26  Prior Level of Function:  Prior Function Per Pt/Caregiver Report  Level of [place]: Independent with ADLs and functional transfers, Independent with homemaking with ambulation  ADL Assistance: Independent  Homemaking Assistance: Independent  Ambulatory Assistance: Independent  Precautions:  Precautions  Medical Precautions: Other (comment) (RSV)  Vital Signs:       Objective   Pain:  Pain Assessment  Pain Assessment: 0-10  Pain Score: 0 - No pain  Cognition:  Cognition  Overall Cognitive Status: Within Functional Limits  Attention: Within Functional Limits  Memory: Within Funtional Limits  Processing Speed: Within funtional limits    General Assessments:  General Observation  General Observation: No apparent distress.               Activity Tolerance  Endurance: Tolerates less than 10 min exercise with changes in vital signs (SOB wtih minimal exertion noted)         Strength  Strength Comments: Bilat LEs grossly WFLs  Strength  Strength Comments: Bilat LEs grossly WFLs           Coordination  Movements are Fluid and Coordinated: Yes         Static Sitting Balance  Static Sitting-Balance Support: Feet supported  Static Sitting-Level of Assistance: Independent       Functional Assessments:       Bed Mobility  Bed Mobility: Yes  Bed Mobility 1  Bed Mobility 1: Supine to sitting  Level of Assistance 1: Independent    Transfers  Transfer: Yes  Transfer 1  Technique 1: Sit to stand, Stand to sit  Transfer Level of Assistance 1: Independent    Ambulation/Gait Training  Ambulation/Gait Training Performed: Yes  Ambulation/Gait Training 1  Surface 1: Level tile  Device 1: No device  Assistance 1: Modified independent  Comments/Distance (ft) 1: Pt ambulated 60 feet in room with multiple rest breaks due to SOB with  minimal exertion.            Extremity/Trunk Assessments:  RLE   RLE : Within Functional Limits  LLE   LLE : Within Functional Limits  Outcome Measures:  [facility] Basic Mobility  Turning from your back to your side while in a flat bed without using bedrails: None  Moving from lying on your back to sitting on the side of a flat bed without using bedrails: None  Moving to and from bed to chair (including a wheelchair): None  Standing up from a chair using your arms (e.g. wheelchair or bedside chair): None  To walk in hospital room: A little  Climbing 3-5 steps with railing: A little  Basic Mobility - Total Score: 22    Encounter Problems       Encounter Problems (Active)       Pain - Adult              Education Documentation  Precautions, taught by Chrisise Walters, PT at 1/28/2024 10:02 AM.  Learner: Patient  Readiness: Acceptance  Method: Explanation  Response: Verbalizes Understanding, Demonstrated Understanding    Mobility Training, taught by Chrissie Walters, PT at 1/28/2024 10:02 AM.  Learner: Patient  Readiness: Acceptance  Method: Explanation  Response: Verbalizes Understanding, Demonstrated Understanding    Education Comments  No comments found.

## 2024-01-29 VITALS
TEMPERATURE: 97.8 F | OXYGEN SATURATION: 100 % | HEIGHT: 59 IN | HEART RATE: 76 BPM | WEIGHT: 126.98 LBS | BODY MASS INDEX: 25.6 KG/M2 | DIASTOLIC BLOOD PRESSURE: 62 MMHG | RESPIRATION RATE: 16 BRPM | SYSTOLIC BLOOD PRESSURE: 120 MMHG

## 2024-01-29 LAB
ANION GAP SERPL CALC-SCNC: 10 MMOL/L (ref 10–20)
ATRIAL RATE: 76 BPM
BUN SERPL-MCNC: 23 MG/DL (ref 6–23)
CALCIUM SERPL-MCNC: 8.3 MG/DL (ref 8.6–10.3)
CHLORIDE SERPL-SCNC: 110 MMOL/L (ref 98–107)
CO2 SERPL-SCNC: 26 MMOL/L (ref 21–32)
CREAT SERPL-MCNC: 1.47 MG/DL (ref 0.5–1.05)
EGFRCR SERPLBLD CKD-EPI 2021: 39 ML/MIN/1.73M*2
ERYTHROCYTE [DISTWIDTH] IN BLOOD BY AUTOMATED COUNT: 17.9 % (ref 11.5–14.5)
FERRITIN SERPL-MCNC: 218 NG/ML (ref 8–150)
GLUCOSE BLD MANUAL STRIP-MCNC: 89 MG/DL (ref 74–99)
GLUCOSE SERPL-MCNC: 96 MG/DL (ref 74–99)
HCT VFR BLD AUTO: 35 % (ref 36–46)
HGB BLD-MCNC: 11.3 G/DL (ref 12–16)
MCH RBC QN AUTO: 20.8 PG (ref 26–34)
MCHC RBC AUTO-ENTMCNC: 32.3 G/DL (ref 32–36)
MCV RBC AUTO: 64 FL (ref 80–100)
NRBC BLD-RTO: 0 /100 WBCS (ref 0–0)
P AXIS: 66 DEGREES
P OFFSET: 203 MS
P ONSET: 157 MS
PLATELET # BLD AUTO: 96 X10*3/UL (ref 150–450)
POTASSIUM SERPL-SCNC: 4.1 MMOL/L (ref 3.5–5.3)
PR INTERVAL: 126 MS
Q ONSET: 220 MS
QRS COUNT: 13 BEATS
QRS DURATION: 78 MS
QT INTERVAL: 364 MS
QTC CALCULATION(BAZETT): 409 MS
QTC FREDERICIA: 393 MS
R AXIS: 58 DEGREES
RBC # BLD AUTO: 5.44 X10*6/UL (ref 4–5.2)
SODIUM SERPL-SCNC: 142 MMOL/L (ref 136–145)
T AXIS: 51 DEGREES
T OFFSET: 402 MS
VENTRICULAR RATE: 76 BPM
WBC # BLD AUTO: 13.4 X10*3/UL (ref 4.4–11.3)

## 2024-01-29 PROCEDURE — 82947 ASSAY GLUCOSE BLOOD QUANT: CPT

## 2024-01-29 PROCEDURE — 85027 COMPLETE CBC AUTOMATED: CPT | Performed by: HOSPITALIST

## 2024-01-29 PROCEDURE — 99239 HOSP IP/OBS DSCHRG MGMT >30: CPT | Performed by: HOSPITALIST

## 2024-01-29 PROCEDURE — 94640 AIRWAY INHALATION TREATMENT: CPT

## 2024-01-29 PROCEDURE — 2500000002 HC RX 250 W HCPCS SELF ADMINISTERED DRUGS (ALT 637 FOR MEDICARE OP, ALT 636 FOR OP/ED): Performed by: PHYSICIAN ASSISTANT

## 2024-01-29 PROCEDURE — G0378 HOSPITAL OBSERVATION PER HR: HCPCS

## 2024-01-29 PROCEDURE — 2500000002 HC RX 250 W HCPCS SELF ADMINISTERED DRUGS (ALT 637 FOR MEDICARE OP, ALT 636 FOR OP/ED): Performed by: STUDENT IN AN ORGANIZED HEALTH CARE EDUCATION/TRAINING PROGRAM

## 2024-01-29 PROCEDURE — 2500000004 HC RX 250 GENERAL PHARMACY W/ HCPCS (ALT 636 FOR OP/ED): Performed by: PHYSICIAN ASSISTANT

## 2024-01-29 PROCEDURE — 99231 SBSQ HOSP IP/OBS SF/LOW 25: CPT | Performed by: INTERNAL MEDICINE

## 2024-01-29 PROCEDURE — 2500000002 HC RX 250 W HCPCS SELF ADMINISTERED DRUGS (ALT 637 FOR MEDICARE OP, ALT 636 FOR OP/ED): Performed by: INTERNAL MEDICINE

## 2024-01-29 PROCEDURE — 2500000001 HC RX 250 WO HCPCS SELF ADMINISTERED DRUGS (ALT 637 FOR MEDICARE OP): Performed by: PHYSICIAN ASSISTANT

## 2024-01-29 PROCEDURE — 80048 BASIC METABOLIC PNL TOTAL CA: CPT | Performed by: HOSPITALIST

## 2024-01-29 PROCEDURE — 36415 COLL VENOUS BLD VENIPUNCTURE: CPT | Performed by: HOSPITALIST

## 2024-01-29 RX ORDER — DEXTROSE 4 G
TABLET,CHEWABLE ORAL
Qty: 1 EACH | Refills: 0 | Status: SHIPPED | OUTPATIENT
Start: 2024-01-29 | End: 2025-01-28

## 2024-01-29 RX ORDER — AMOXICILLIN 250 MG
2 CAPSULE ORAL 2 TIMES DAILY PRN
Qty: 120 TABLET | Refills: 0 | Status: SHIPPED | OUTPATIENT
Start: 2024-01-29 | End: 2024-02-28

## 2024-01-29 RX ORDER — GLIMEPIRIDE 2 MG/1
2 TABLET ORAL
Qty: 30 TABLET | Refills: 0 | Status: SHIPPED | OUTPATIENT
Start: 2024-01-30 | End: 2024-04-04 | Stop reason: HOSPADM

## 2024-01-29 RX ORDER — FLUTICASONE FUROATE AND VILANTEROL 200; 25 UG/1; UG/1
1 POWDER RESPIRATORY (INHALATION) DAILY
Qty: 30 EACH | Refills: 0 | Status: SHIPPED | OUTPATIENT
Start: 2024-01-29 | End: 2024-02-20 | Stop reason: WASHOUT

## 2024-01-29 RX ADMIN — METOPROLOL TARTRATE 50 MG: 50 TABLET, FILM COATED ORAL at 08:32

## 2024-01-29 RX ADMIN — PANTOPRAZOLE SODIUM 40 MG: 40 TABLET, DELAYED RELEASE ORAL at 06:43

## 2024-01-29 RX ADMIN — AMLODIPINE BESYLATE 10 MG: 10 TABLET ORAL at 08:32

## 2024-01-29 RX ADMIN — INSULIN HUMAN 14 UNITS: 100 INJECTION, SUSPENSION SUBCUTANEOUS at 08:30

## 2024-01-29 RX ADMIN — ASPIRIN 81 MG: 81 TABLET, COATED ORAL at 08:32

## 2024-01-29 RX ADMIN — SENNOSIDES AND DOCUSATE SODIUM 2 TABLET: 8.6; 5 TABLET ORAL at 08:32

## 2024-01-29 RX ADMIN — IPRATROPIUM BROMIDE AND ALBUTEROL SULFATE 3 ML: 2.5; .5 SOLUTION RESPIRATORY (INHALATION) at 07:38

## 2024-01-29 RX ADMIN — FLUTICASONE FUROATE AND VILANTEROL TRIFENATATE 1 PUFF: 200; 25 POWDER RESPIRATORY (INHALATION) at 07:50

## 2024-01-29 ASSESSMENT — COGNITIVE AND FUNCTIONAL STATUS - GENERAL
MOBILITY SCORE: 24
DAILY ACTIVITIY SCORE: 24

## 2024-01-29 ASSESSMENT — PAIN - FUNCTIONAL ASSESSMENT
PAIN_FUNCTIONAL_ASSESSMENT: 0-10
PAIN_FUNCTIONAL_ASSESSMENT: 0-10

## 2024-01-29 ASSESSMENT — PAIN SCALES - GENERAL
PAINLEVEL_OUTOF10: 0 - NO PAIN

## 2024-01-29 NOTE — CARE PLAN
Patient admitted for COPD exacerbation, recent RSV infection    Patient was recently dc'd home after COPD exacerbation but did not take any of her new meds, this resulted in a return to ED and readmit, new information given to patient by Dr Hardy this morning with this TCC present and patient has been monitored off steroids and no longer will dc with steroids and no longer needs sub q insulin, she is aware of med ready and discharge today, emergency contact phone number updated and confirmed address in computer is correct.  Pcp Dr Lamas, referral to [facility], patient is not sure if she still needs this d/t no longer needing new insulin teaching.    ADOD today  DC: [facility] vs home no needs

## 2024-01-29 NOTE — CARE PLAN
The patient's goals for the shift include      The clinical goals for the shift include pt will remain safe and clinically stable

## 2024-01-29 NOTE — DISCHARGE INSTRUCTIONS
Check your blood sugar twice daily.  Always fasting first thing in the morning, then alternate another time during the day (before lunch, before dinner, or at bedtime)    LOW BLOOD SUGARS TREATMENTS    RULE OF 15    IF POSSIBLE CHECK YOUR BLOOD SUGAR    TREAT IF BELOW  70 mg/dL or HAVING SYMPTOMS OF LOW BLOOD SUGAR    Treat with 1 carb choice or 15 grams of carbohydrates as listed  3-4 round glucose tablets OR  4 oz of juice OR  4 oz regular soda pop OR  1 tube instant glucose gel OR  3-5 pieces of hard candy chewed (not sugar free)    Wait 15 minutes.  Retest.  If your blood sugar is less than 70 mg/dL or your still have symptoms, repeat treatment.      If you still do not feel better after 3 treatments, call your healthcare provider or 91

## 2024-01-29 NOTE — PROGRESS NOTES
"Yesy Ayala is a 68 y.o. female on day 3 of admission presenting with COPD exacerbation (CMS/[facility]).    Subjective   No new complaints     Objective   Physical Exam  Constitutional:       General: She is not in acute distress.  Neurological:      Mental Status: She is alert.         Last Recorded Vitals  Blood pressure 122/68, pulse 96, temperature 37.2 °C (99 °F), temperature source Temporal, resp. rate 15, height 1.499 m (4' 11\"), weight 57.6 kg (126 lb 15.8 oz), SpO2 96 %.  Intake/Output last 3 Shifts:  I/O last 3 completed shifts:  In: 720 (12.5 mL/kg) [P.O.:720]  Out: - (0 mL/kg)   Weight: 57.6 kg     Relevant Results  Results from last 7 days   Lab Units 01/29/24  0443 01/28/24  2152 01/28/24  1603 01/28/24  1152 01/28/24  0736 01/28/24  0653 01/27/24  2031 01/27/24  1125 01/27/24  0841 01/26/24  1644 01/26/24  1458 01/24/24  0727 01/24/24  0508   POCT GLUCOSE mg/dL  --  166* 143* 196* 333*  --  350*   < >  --    < >  --    < >  --    GLUCOSE mg/dL 96  --   --   --   --  364*  --   --  159*  --  447*  --  131*    < > = values in this interval not displayed.       Assessment/Plan   Principal Problem:    COPD exacerbation (CMS/[facility])  Active Problems:    Hyperglycemia due to diabetes mellitus (CMS/[facility])    Shortness of breath    Elevated troponin    IMPRESSION  TYPE 2 DIABETES MELLITUS WITH HYPERGLYCEMIA  Longstanding hyperglycemia, further exacerbated by glucocorticoid  Glucose much improved this morning     RECOMMENDATIONS  Decrease NPH to 14 units BID  Lispro 6 units QAC plus corrective scale  Advised she will need insulin as long as she needs steroid  If discharged home, will recommend a premixed insulin.      Juan Manuel العلي MD    "

## 2024-01-30 NOTE — DISCHARGE SUMMARY
Discharge Diagnosis  Mild AECOPD  T2DM with hyperglycemia and hypoglycemia  CKD 3  HTN  HLD  Recent RSV infection  Thrombocytopenia  Microcytic anemia    Issues Requiring Follow-Up  - monitor home bg and have pcp review bg log  - op follow up with pulmonology  - check cbc, bmp in 1-2 weeks    Discharge Meds     Your medication list        START taking these medications        Instructions Last Dose Given Next Dose Due   blood-glucose meter misc      Use daily or as directed for monitoring of diabetes.       fluticasone furoate-vilanteroL 200-25 mcg/dose inhaler  Commonly known as: Breo Ellipta  Replaces: Symbicort 160-4.5 mcg/actuation inhaler      Inhale 1 puff once daily.       glimepiride 2 mg tablet  Commonly known as: Amaryl  Start taking on: January 30, 2024      Take 1 tablet (2 mg) by mouth once daily in the morning. Take before meals. Do not start before January 30, 2024.       sennosides-docusate sodium 8.6-50 mg tablet  Commonly known as: Candida-Colace      Take 2 tablets by mouth 2 times a day as needed for constipation. Hold for loose stools              CHANGE how you take these medications        Instructions Last Dose Given Next Dose Due   metoprolol tartrate 100 mg tablet  Commonly known as: Lopressor  What changed:   how much to take  when to take this      TAKE 1/2 TABLET BY MOUTH TWICE DAILY              CONTINUE taking these medications        Instructions Last Dose Given Next Dose Due   albuterol 90 mcg/actuation inhaler      Inhale 1 puff every 6 hours if needed for shortness of breath.       alcohol swabs pads, medicated      Apply 1 each topically 2 times a day.       amLODIPine 10 mg tablet  Commonly known as: Norvasc      Take 1 tablet (10 mg) by mouth once daily. Do not start before January 24, 2024.       aspirin 81 mg capsule           chlorhexidine 0.12 % solution  Commonly known as: Peridex           cholecalciferol 125 MCG (5000 UT) capsule  Commonly known as: Vitamin D-3          "  ipratropium-albuteroL 0.5-2.5 mg/3 mL nebulizer solution  Commonly known as: Duo-Neb           rosuvastatin 40 mg tablet  Commonly known as: Crestor      TAKE 1 TABLET EVERYDAY AT BEDTIME              STOP taking these medications      azithromycin 500 mg tablet  Commonly known as: Zithromax        insulin NPH (Isophane) 100 unit/mL injection  Commonly known as: HumuLIN N,NovoLIN N        insulin syringe,safetyneedle 29G X 1/2\" 0.5 mL syringe        predniSONE 20 mg tablet  Commonly known as: Deltasone        Symbicort 160-4.5 mcg/actuation inhaler  Generic drug: budesonide-formoteroL  Replaced by: fluticasone furoate-vilanteroL 200-25 mcg/dose inhaler                  Where to Get Your Medications        These medications were sent to [facility]pharmacy #[id number], [address] AT [address] [address]      Phone: [phone]   blood-glucose meter misc  fluticasone furoate-vilanteroL 200-25 mcg/dose inhaler  glimepiride 2 mg tablet  sennosides-docusate sodium 8.6-50 mg tablet         Test Results Pending At Discharge  Pending Labs       No current pending labs.            Hospital Course  68 y.o. female presenting with COPD exacerbation, hyperglycemia, unable to manage medications, shortness of breath.  Patient has a history of CKD, COPD, diabetes, hypertension.  She recently had a COPD exacerbation likely secondary to RSV.  She was admitted from January 15 through January 25.  She was discharged yesterday to respite care supposedly.  Patient states she was sent to [facility] where they did nothing for her.  They handed her a bag of medication and told the patient to contact the pharmacist about how to take them and did nothing to assist her.  Patient was uncomfortable taking and managing the medications on her own so she has not had any medication since she was discharged. Increased shortness of breath on arrival but essentially resolved with resuming " home meds. Initially was on steroids and had hyperglycemia and hypoglycemia on insulin regimen. Endocrinology consulted and recommendation is to dc home with glimiperide 2mg daily and OP follow up with PCP. Stopped steroids as symptoms improved within 1 day after restarting inhalers.    Pertinent Physical Exam At Time of Discharge  Physical Exam  Vitals and nursing note reviewed.   HENT:      Mouth/Throat:      Mouth: Mucous membranes are moist.      Pharynx: Oropharynx is clear.   Cardiovascular:      Rate and Rhythm: Normal rate and regular rhythm.   Pulmonary:      Effort: Pulmonary effort is normal.   Abdominal:      Palpations: Abdomen is soft.   Neurological:      Mental Status: She is alert.         Outpatient Follow-Up  Future Appointments   Date Time Provider Department Center   2/1/2024 10:30 AM GAEL Nur-CNP [id number] [facility]   2/5/2024  3:45 PM Luh Noriega MD [id number] [facility]   2/6/2024  3:40 PM Cydney Moore MD [id number] [facility]         Urban Hardy DO

## 2024-02-01 ENCOUNTER — PATIENT OUTREACH (OUTPATIENT)
Dept: CARE COORDINATION | Facility: CLINIC | Age: 69
End: 2024-02-01
Payer: MEDICARE

## 2024-02-01 NOTE — PROGRESS NOTES
Outreach call to patient to support a smooth transition of care from recent admission.  Spoke with patient, reviewed discharge medications, discharge instructions, assessed social needs, and discussed follow-up appointment with provider. Enrolled patient in [employer] for additional support and education through transition period.  Will continue to monitor through transition period.    Engagement  Call Start Time: 1530 (2/1/2024  3:38 PM)    Medications  Medications reviewed with patient/caregiver?: Yes (2/1/2024  3:38 PM)  Is the patient having any side effects they believe may be caused by any medication additions or changes?: No (2/1/2024  3:38 PM)  Does the patient have all medications ordered at discharge?: Yes (2/1/2024  3:38 PM)  Is the patient taking all medications as directed (includes completed medication regime)?: Yes (2/1/2024  3:38 PM)    Appointments  Does the patient have a primary care provider?: Yes (2/1/2024  3:38 PM)  Care Management Interventions: Verified appointment date/time/provider (2/1/2024  3:38 PM)  Has the patient kept scheduled appointments due by today?: Yes (2/1/2024  3:38 PM)    Self Management  What is the home health agency?: None (2/1/2024  3:38 PM)  What Durable Medical Equipment (DME) was ordered?: None (2/1/2024  3:38 PM)    Patient Teaching  Does the patient have access to their discharge instructions?: Yes (2/1/2024  3:38 PM)  Care Management Interventions: Reviewed instructions with patient (2/1/2024  3:38 PM)  What is the patient's perception of their health status since discharge?: Improving (2/1/2024  3:38 PM)  Is the patient/caregiver able to teach back the hierarchy of who to call/visit for symptoms/problems? PCP, Specialist, Home Health nurse, Urgent Care, ED, 911: Yes (2/1/2024  3:38 PM)    Wrap Up  Wrap Up Additional Comments: CM spoke with patient states she is improving decrease sob.  Patient states she lives alone but is able to complete ADLs.  Reviewed  discharged summary with patient.  Disussed upcoming appointments and discharge medications.  Patient has no needs or questions at this time.  Pt. appreciated the outreach call CM will continue follow up calls for transition of care.  DOMINGO Webster, RN (2/1/2024  3:38 PM)  Call End Time: 1540 (2/1/2024  3:38 PM)

## 2024-02-06 ENCOUNTER — OFFICE VISIT (OUTPATIENT)
Dept: PRIMARY CARE | Facility: CLINIC | Age: 69
End: 2024-02-06
Payer: MEDICARE

## 2024-02-06 VITALS — SYSTOLIC BLOOD PRESSURE: 130 MMHG | BODY MASS INDEX: 25.04 KG/M2 | DIASTOLIC BLOOD PRESSURE: 80 MMHG | WEIGHT: 124 LBS

## 2024-02-06 DIAGNOSIS — E11.65 TYPE 2 DIABETES MELLITUS WITH HYPERGLYCEMIA, WITHOUT LONG-TERM CURRENT USE OF INSULIN (MULTI): Chronic | ICD-10-CM

## 2024-02-06 DIAGNOSIS — I25.10 ASHD (ARTERIOSCLEROTIC HEART DISEASE): ICD-10-CM

## 2024-02-06 DIAGNOSIS — Z12.31 ENCOUNTER FOR SCREENING MAMMOGRAM FOR MALIGNANT NEOPLASM OF BREAST: ICD-10-CM

## 2024-02-06 DIAGNOSIS — Z00.00 ANNUAL PHYSICAL EXAM: ICD-10-CM

## 2024-02-06 DIAGNOSIS — J21.0 RSV (ACUTE BRONCHIOLITIS DUE TO RESPIRATORY SYNCYTIAL VIRUS): Primary | ICD-10-CM

## 2024-02-06 DIAGNOSIS — R06.02 SHORTNESS OF BREATH: ICD-10-CM

## 2024-02-06 DIAGNOSIS — N18.31 STAGE 3A CHRONIC KIDNEY DISEASE (MULTI): Chronic | ICD-10-CM

## 2024-02-06 DIAGNOSIS — J44.9 CHRONIC OBSTRUCTIVE PULMONARY DISEASE, UNSPECIFIED COPD TYPE (MULTI): ICD-10-CM

## 2024-02-06 PROCEDURE — 1036F TOBACCO NON-USER: CPT | Performed by: INTERNAL MEDICINE

## 2024-02-06 PROCEDURE — 99495 TRANSJ CARE MGMT MOD F2F 14D: CPT | Performed by: INTERNAL MEDICINE

## 2024-02-06 PROCEDURE — 3075F SYST BP GE 130 - 139MM HG: CPT | Performed by: INTERNAL MEDICINE

## 2024-02-06 PROCEDURE — 3079F DIAST BP 80-89 MM HG: CPT | Performed by: INTERNAL MEDICINE

## 2024-02-06 PROCEDURE — 1159F MED LIST DOCD IN RCRD: CPT | Performed by: INTERNAL MEDICINE

## 2024-02-06 PROCEDURE — 1111F DSCHRG MED/CURRENT MED MERGE: CPT | Performed by: INTERNAL MEDICINE

## 2024-02-06 PROCEDURE — 1126F AMNT PAIN NOTED NONE PRSNT: CPT | Performed by: INTERNAL MEDICINE

## 2024-02-06 RX ORDER — FLUTICASONE FUROATE, UMECLIDINIUM BROMIDE AND VILANTEROL TRIFENATATE 200; 62.5; 25 UG/1; UG/1; UG/1
1 POWDER RESPIRATORY (INHALATION)
Qty: 1 EACH | Refills: 2 | Status: SHIPPED | OUTPATIENT
Start: 2024-02-06

## 2024-02-06 NOTE — PROGRESS NOTES
Chief Complaint:   Chief Complaint   Patient presents with    Hospital Follow-up    Med Refill      HPI    Yesy Ayala is a 68 y.o. year old female who presents to the clinic for hospital follow up  Hospital records, notes, BW, imaging, reviewed  Ddiagnosed with RSV and COPD exacerbation    Past Medical History   Past Medical History:   Diagnosis Date    Body mass index (BMI) 27.0-27.9, adult 01/18/2020    BMI 27.0-27.9,adult    Other conditions influencing health status     Coronary Artery Disease    Personal history of other diseases of the circulatory system     History of hypertension    Personal history of other diseases of the circulatory system     History of intermittent claudication    Personal history of other diseases of the circulatory system     History of peripheral vascular disease    Personal history of other endocrine, nutritional and metabolic disease     History of hyperlipidemia      Past Surgical History:   Past Surgical History:   Procedure Laterality Date    OTHER SURGICAL HISTORY  08/22/2013    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  Social History:   Tobacco Use: Medium Risk (1/26/2024)    Patient History     Smoking Tobacco Use: Former     Smokeless Tobacco Use: Never     Passive Exposure: Never      Social History     Substance and Sexual Activity   Alcohol Use Never        Allergies:   Allergies   Allergen Reactions    Penicillin Anaphylaxis    Metformin Dizziness and Nausea Only    Sulfa (Sulfonamide Antibiotics) Hives, Itching and Rash        ROS   Review of Systems     Objective   Vitals:    02/06/24 1455   BP: 130/80      BMI Readings from Last 15 Encounters:   02/06/24 25.04 kg/m²   01/26/24 25.65 kg/m²   01/15/24 25.65 kg/m²   11/06/23 26.33 kg/m²   02/01/23 26.54  "kg/m²   11/23/22 26.75 kg/m²   08/10/22 25.71 kg/m²   02/02/22 26.33 kg/m²   07/07/21 27.59 kg/m²   03/04/20 28.01 kg/m²      56.2 kg (124 lb) (2/6/2024  2:55 PM)      Physical Exam  Physical Exam  Constitutional:       Appearance: She is well-developed.   Cardiovascular:      Rate and Rhythm: Normal rate and regular rhythm.      Heart sounds: Normal heart sounds. No murmur heard.  Pulmonary:      Effort: Pulmonary effort is normal.      Breath sounds: Wheezing and rhonchi present.   Abdominal:      General: Bowel sounds are normal.      Palpations: Abdomen is soft.          Labs:  Lab Results   Component Value Date    WBC 13.4 (H) 01/29/2024    HGB 11.3 (L) 01/29/2024    HCT 35.0 (L) 01/29/2024    MCV 64 (L) 01/29/2024    PLT 96 (L) 01/29/2024     Lab Results   Component Value Date    GLUCOSE 96 01/29/2024    CALCIUM 8.3 (L) 01/29/2024     01/29/2024    K 4.1 01/29/2024    CO2 26 01/29/2024     (H) 01/29/2024    BUN 23 01/29/2024    CREATININE 1.47 (H) 01/29/2024         No components found for: \"URINE ALBUMIN\"  Lab Results   Component Value Date    HGBA1C 9.3 (H) 11/02/2023      Lab Results   Component Value Date    HGBA1C 9.3 (H) 11/02/2023    HGBA1C 8.0 (A) 11/22/2022    HGBA1C 8.6 02/03/2021     Lab Results   Component Value Date    TSH 2.93 11/02/2023       Current Medications:  Current Outpatient Medications   Medication Sig Dispense Refill    albuterol 90 mcg/actuation inhaler Inhale 1 puff every 6 hours if needed for shortness of breath. 18 g 2    alcohol swabs pads, medicated Apply 1 each topically 2 times a day. 100 each 0    amLODIPine (Norvasc) 10 mg tablet Take 1 tablet (10 mg) by mouth once daily. Do not start before January 24, 2024. 30 tablet 0    aspirin 81 mg EC tablet Take 1 tablet (81 mg) by mouth once daily.      blood-glucose meter misc Use daily or as directed for monitoring of diabetes. 1 each 0    chlorhexidine (Peridex) 0.12 % solution RINSE MOUTH WITH 15ML (1 CAPFUL) FOR 30 " SECONDS IN MORNING AND EVENING AFTER BRUSHING, THEN SPIT      cholecalciferol (Vitamin D-3) 125 MCG (5000 UT) capsule Take 1 capsule (125 mcg) by mouth once daily.      fluticasone furoate-vilanteroL (Breo Ellipta) 200-25 mcg/dose inhaler Inhale 1 puff once daily. 30 each 0    fluticasone-umeclidin-vilanter (Trelegy Ellipta) 200-62.5-25 mcg blister with device Inhale 1 puff once daily in the morning. Take before meals. 1 each 2    glimepiride (Amaryl) 2 mg tablet Take 1 tablet (2 mg) by mouth once daily in the morning. Take before meals. Do not start before January 30, 2024. 30 tablet 0    ipratropium-albuteroL (Duo-Neb) 0.5-2.5 mg/3 mL nebulizer solution Take 3 mL by nebulization 4 times a day.      metoprolol tartrate (Lopressor) 100 mg tablet TAKE 1/2 TABLET BY MOUTH TWICE DAILY 45 tablet 3    rosuvastatin (Crestor) 40 mg tablet TAKE 1 TABLET EVERYDAY AT BEDTIME 90 tablet 3    sennosides-docusate sodium (Candida-Colace) 8.6-50 mg tablet Take 2 tablets by mouth 2 times a day as needed for constipation. Hold for loose stools 120 tablet 0     No current facility-administered medications for this visit.       Assessment and Plan  Yesy was seen today for hospital follow-up and med refill.  Diagnoses and all orders for this visit:  RSV (acute bronchiolitis due to respiratory syncytial virus) (Primary)  Shortness of breath  Type 2 diabetes mellitus with hyperglycemia, without long-term current use of insulin (CMS/[facility])  Stage 3a chronic kidney disease (CMS/HCC)  ASHD (arteriosclerotic heart disease)  Chronic obstructive pulmonary disease, unspecified COPD type (CMS/[facility])  -     fluticasone-umeclidin-vilanter (Trelegy Ellipta) 200-62.5-25 mcg blister with device; Inhale 1 puff once daily in the morning. Take before meals.  -     Referral to Pulmonology; Future  Annual physical exam  -     BI mammo bilateral screening tomosynthesis; Future  Encounter for screening mammogram for malignant neoplasm of breast  -     BI mammo  bilateral screening tomosynthesis; Future       RVS infection  COPD exacerbation  Hypoxia  Wheezing  Was given steroids at the hospital  Improving slowly  Given Breo inhaler  We will change to Trelegy  Pulm referral scheduled  Needs to get PREVNAR 20 at the pharmacy  BG levels up  Likely due to steroids  Recheck in 6 weeks and adjust regiment accordingly.        Immunizations:  Immunization History   Administered Date(s) Administered    Flu vaccine (IIV4), preservative free *Check age/dose* 11/21/2019    Moderna SARS-CoV-2 Vaccination 05/19/2021, 06/16/2021    Pneumococcal polysaccharide vaccine, 23-valent, age 2 years and older (PNEUMOVAX 23) 02/02/2016     Please follow up in

## 2024-02-20 ENCOUNTER — OFFICE VISIT (OUTPATIENT)
Dept: PULMONOLOGY | Facility: CLINIC | Age: 69
End: 2024-02-20
Payer: MEDICARE

## 2024-02-20 VITALS
HEART RATE: 68 BPM | RESPIRATION RATE: 22 BRPM | SYSTOLIC BLOOD PRESSURE: 149 MMHG | OXYGEN SATURATION: 98 % | WEIGHT: 120 LBS | BODY MASS INDEX: 24.19 KG/M2 | DIASTOLIC BLOOD PRESSURE: 70 MMHG | TEMPERATURE: 97.8 F | HEIGHT: 59 IN

## 2024-02-20 DIAGNOSIS — R91.8 PULMONARY NODULES/LESIONS, MULTIPLE: ICD-10-CM

## 2024-02-20 DIAGNOSIS — F17.210 CIGARETTE NICOTINE DEPENDENCE WITHOUT COMPLICATION: Primary | ICD-10-CM

## 2024-02-20 DIAGNOSIS — J44.9 CHRONIC OBSTRUCTIVE PULMONARY DISEASE, UNSPECIFIED COPD TYPE (MULTI): ICD-10-CM

## 2024-02-20 PROCEDURE — 1160F RVW MEDS BY RX/DR IN RCRD: CPT | Performed by: INTERNAL MEDICINE

## 2024-02-20 PROCEDURE — 99406 BEHAV CHNG SMOKING 3-10 MIN: CPT | Performed by: INTERNAL MEDICINE

## 2024-02-20 PROCEDURE — 99215 OFFICE O/P EST HI 40 MIN: CPT | Performed by: INTERNAL MEDICINE

## 2024-02-20 PROCEDURE — 3077F SYST BP >= 140 MM HG: CPT | Performed by: INTERNAL MEDICINE

## 2024-02-20 PROCEDURE — 1159F MED LIST DOCD IN RCRD: CPT | Performed by: INTERNAL MEDICINE

## 2024-02-20 PROCEDURE — 3078F DIAST BP <80 MM HG: CPT | Performed by: INTERNAL MEDICINE

## 2024-02-20 PROCEDURE — 1111F DSCHRG MED/CURRENT MED MERGE: CPT | Performed by: INTERNAL MEDICINE

## 2024-02-20 PROCEDURE — 1126F AMNT PAIN NOTED NONE PRSNT: CPT | Performed by: INTERNAL MEDICINE

## 2024-02-20 ASSESSMENT — PAIN SCALES - GENERAL: PAINLEVEL: 0-NO PAIN

## 2024-02-20 NOTE — PROGRESS NOTES
Department of Medicine  Division of Pulmonary, Critical Care, and Sleep Medicine  Location  [facility], [address]    I was asked by Cydney Moore, *, to evaluate Yesy Ayala for COPD. I have independently interviewed and examined the patient in the office and reviewed available records.     Physician HPI (2/20/2024):  68 y.o. year-old female who was admitted to [facility] for AECOPD from 1/15/2024-1/24/2024. She was then re-admitted from 1/26/24-1/29/24. She was RSV positive. She has a past history of peripheral vascular disease, CAD, pulmonary nodules, hypertension. Patient states she did receive the RSV vaccine on.  She has never had a flareup of her breathing before last month.  She is an active 20-pack-year smoker but has cut back down to 1 cigarette every other day.  She states that once she is finished with this pack, she will quit.  She was discharged on Symbicort which she is using.  Her PCP prescribed her Trelegy Ellipta, and she will start taking this once her Symbicort finishes.  She states she does have an albuterol rescue inhaler which she uses very very infrequently.  She states she is able to walk 100 yards without issue.  The only time she gets short of breath is when walking up a flight of stairs.  She denies any cough.  Occasionally she has some mucus production in the morning.  Overall since her recent hospitalization she feels like her breathing is 90% back to normal. She has never been evaluated by an outpatient pulmonologist, and has never had PFTs.    PMH:  Past Medical History:   Diagnosis Date    Body mass index (BMI) 27.0-27.9, adult 01/18/2020    BMI 27.0-27.9,adult    Other conditions influencing health status     Coronary Artery Disease    Personal history of other diseases of the circulatory system     History of hypertension    Personal history of other diseases of the circulatory system     History of intermittent claudication    Personal history of other diseases  of the circulatory system     History of peripheral vascular disease    Personal history of other endocrine, nutritional and metabolic disease     History of hyperlipidemia       PSH:  Past Surgical History:   Procedure Laterality Date    OTHER SURGICAL HISTORY  08/22/2013    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    OTHER SURGICAL HISTORY  08/22/2013    Bypass Graft (Non-Vein) Aortic-bifemoral    OTHER SURGICAL HISTORY  02/20/2014    Previous Stent Placement       FHx:  Family History   Problem Relation Name Age of Onset    COPD Mother      No Known Problems Father      No Known Problems Sister      No Known Problems Brother         Social Hx:  Social History     Socioeconomic History    Marital status: Single     Spouse name: None    Number of children: None    Years of education: None    Highest education level: None   Occupational History    None   Tobacco Use    Smoking status: Some Days     Packs/day: 0.50     Years: 40.00     Additional pack years: 0.00     Total pack years: 20.00     Types: Cigarettes     Passive exposure: Never    Smokeless tobacco: Never   Vaping Use    Vaping Use: Never used   Substance and Sexual Activity    Alcohol use: Not Currently     Comment: social    Drug use: Not Currently     Types: Marijuana     Comment: Long time ago    Sexual activity: Not Currently   Other Topics Concern    None   Social History Narrative    None     Social Determinants of Health     Financial Resource Strain: Low Risk  (1/16/2024)    Overall Financial Resource Strain (CARDIA)     Difficulty of Paying Living Expenses: Not hard at all   Food Insecurity: Not on file   Transportation Needs: No Transportation Needs (1/16/2024)    PRAPARE - Transportation     Lack of Transportation (Medical): No     Lack of Transportation (Non-Medical): No   Physical Activity: Not on file   Stress: Not on file   Social Connections: Not on file   Intimate Partner Violence: Not on file   Housing Stability: Low  Risk  (1/16/2024)    Housing Stability Vital Sign     Unable to Pay for Housing in the Last Year: No     Number of Places Lived in the Last Year: 1     Unstable Housing in the Last Year: No       Immunization History:  Immunization History   Administered Date(s) Administered    Flu vaccine (IIV4), preservative free *Check age/dose* 11/21/2019    Moderna SARS-CoV-2 Vaccination 05/19/2021, 06/16/2021    Pneumococcal polysaccharide vaccine, 23-valent, age 2 years and older (PNEUMOVAX 23) 02/02/2016       Current Medications:  Current Outpatient Medications   Medication Instructions    albuterol 90 mcg/actuation inhaler 1 puff, inhalation, Every 6 hours PRN    alcohol swabs pads, medicated 1 each, topical (top), 2 times daily    amLODIPine (NORVASC) 10 mg, oral, Daily    aspirin 81 mg, oral, Daily    blood-glucose meter misc Use daily or as directed for monitoring of diabetes.    chlorhexidine (Peridex) 0.12 % solution RINSE MOUTH WITH 15ML (1 CAPFUL) FOR 30 SECONDS IN MORNING AND EVENING AFTER BRUSHING, THEN SPIT    cholecalciferol (VITAMIN D-3) 125 mcg, oral, Daily    fluticasone furoate-vilanteroL (Breo Ellipta) 200-25 mcg/dose inhaler 1 puff, inhalation, Daily    fluticasone-umeclidin-vilanter (Trelegy Ellipta) 200-62.5-25 mcg blister with device 1 puff, inhalation, Daily before breakfast    glimepiride (AMARYL) 2 mg, oral, Daily before breakfast    ipratropium-albuteroL (Duo-Neb) 0.5-2.5 mg/3 mL nebulizer solution 3 mL, nebulization, 4 times daily RT    metoprolol tartrate (LOPRESSOR) 50 mg, oral, 2 times daily    rosuvastatin (CRESTOR) 40 mg, oral, Nightly    sennosides-docusate sodium (Candida-Colace) 8.6-50 mg tablet 2 tablets, oral, 2 times daily PRN, Hold for loose stools        Drug Allergies/Intolerances:  Allergies   Allergen Reactions    Penicillin Anaphylaxis    Metformin Dizziness and Nausea Only    Sulfa (Sulfonamide Antibiotics) Hives, Itching and Rash        Review of Systems:  Review of Systems   All  "other systems reviewed and are negative.     Physical Examination:  /70 (BP Location: Left arm, Patient Position: Sitting)   Pulse 68   Temp 36.6 °C (97.8 °F) (Temporal)   Resp 22   Ht 1.499 m (4' 11\")   Wt 54.4 kg (120 lb)   SpO2 98%   BMI 24.24 kg/m²      GEN: appears stated age. No respiratory difficulty  ENT: Mallampati III,   CV: RRR, no m/g/r  LUNGS: good effort, quiet breath sounds  ABD: Soft, nontender  EXT: no edema, cyanosis, clubbing  NEURO: strength equal bilaterally, sensation grossly intact        Pulmonary Function Test Results       Exacerbation History       Chest Radiograph     XR chest 2 views 01/26/2024    Impression  1.  No evidence of acute cardiopulmonary process.      Chest CT Scan     1/20/2024:  1.  Stable right lung micro nodules as described.  2. Bronchitis with peripheral mucous plugs in the lower lungs    CT LUNG SCREENING LOW DOSE  2/23/2023    FINDINGS:  LUNGS AND AIRWAYS:  The trachea and central airways are patent. No endobronchial lesion  is seen.    There is moderate bilateral upper lung predominant centrilobular and  paraseptal emphysema.There is no focal consolidation, pleural  effusion, or pneumothorax. Subpleural reticulation in the lungs.    There are few bilateral noncalcified pulmonary nodules seen. For  example,    3 mm right apical nodule, image 36/79.  2 mm right upper lobe nodule, image 114/279.  4 mm right upper lobe nodule, image 150/279.  3 mm left apical nodule, image 46/279.  3 mm left lower lobe nodule, image 179/279.    MEDIASTINUM AND JOSIAS, LOWER NECK AND AXILLA:  The visualized thyroid gland is within normal limits.  No evidence of thoracic lymphadenopathy by CT criteria.  Esophagus appears within normal limits as seen.    Impression  1.  Few small bilateral noncalcified pulmonary nodules measuring up  to 4 mm, likely benign. Continued screening with low-dose noncontrast  chest CT in 12 months (from current date) is recommended.  2. Moderate " emphysema.  3. Severe coronary artery calcification.  4. Upper abdominal findings as described above      Bronchoscopy       Labs     Lab Results   Component Value Date    WBC 13.4 (H) 01/29/2024    HGB 11.3 (L) 01/29/2024    HCT 35.0 (L) 01/29/2024    MCV 64 (L) 01/29/2024    PLT 96 (L) 01/29/2024     Lab Results   Component Value Date     (H) 01/26/2024     Lab Results   Component Value Date    EOSABS 0.02 01/26/2024    EOSABS 0.00 01/15/2024         Echocardiogram     No results found for this or any previous visit from the past 365 days.       CAT and mMRC     COPD Assessment Test (CAT)      I never cough 2 I cough all the time   I have no phlegm (mucus) in my chest at all 0 My chest is completely full of phlegm (mucus)   My chest does not feel tight at all 0 My chest feels very tight   When I walk up a hill or one flight of stairs I am not breathless 3 When I walk up a hill or one flight of stairs I am very breathless   I am not limited doing any activities at home 0 I am very limited doing activities at home   I am confident leaving my home despite my lung condition 0 I am not at all confident leaving my home because of my lung condition   I sleep soundly 2 I don't sleep soundly because of my lung condition   I have lots of energy 1 I have no energy at all     Total: 8    >30 Very High  >20 High  10-20 Medium  <10 Low  5 upper limit of normal in healthy adults    Reference: Mookie PW, Vladimir G, Carlos A P, Samuel I, Racheal WH, Sylvia Willingham N. Development and first validation of the COPD Assessment Test. Eur Respir J. 2009;34(3):648-54.        mMRC (Modified Medical Research [place]) Dyspnea Scale  Dyspnea when hurrying or walking up a hill: +1    Reference: Magaly DA, Wells CK. Evaluation of clinical methods for rating dyspnea. CHEST. 1988;93(3):580-6.    ASSESSMENT & PLAN     Problem List Items Addressed This Visit       COPD (chronic obstructive pulmonary disease) (CMS/[facility])    Relevant Medications     pneumococcal conjugate (Prevnar 13) 0.5 mL vaccine    Other Relevant Orders    Complete Pulmonary Function Test Pre/Post Bronchodialator (Spirometry Pre/Post/DLCO/Lung Volumes)    Follow Up In Pulmonology    Pulmonary nodules/lesions, multiple     Other Visit Diagnoses       Cigarette nicotine dependence without complication    -  Primary    Relevant Orders    CT lung screening low dose             Summary:  68 y.o. year-old female with presumed COPD presenting for follow up after recent admission for RSV. She is almost back to baseline. CAT is 8. mMRC is 1.    PLAN:  -Bronchodilators: agree with Trelegy. Her eosinophil count was zero, but this was also in the setting of steroids. May taper back at next visit to [facility] or [facility]  -Will get formal PFT to evaluate her lung function  -Vaccinations: influenza: agreeable to receive this season; pneumococcal: agreeable to receive this season; COVID: agreeable to receive this season; RSV received  -Lung cancer screening: LDCT for January 2025  -Oxygen: no requirements  -Counseled on smoking cessation for 5 minutes.  -Lung nodules are all less than 6mm      Follow-up: 6 months      Curt Gurrola DO  Staff Physician - Pulmonary & Critical Care  02/20/24 9:40 AM  Office number: [phone]   Fax number:  [phone]

## 2024-03-29 ENCOUNTER — HOSPITAL ENCOUNTER (INPATIENT)
Facility: HOSPITAL | Age: 69
LOS: 6 days | Discharge: HOME | DRG: 190 | End: 2024-04-04
Attending: EMERGENCY MEDICINE | Admitting: INTERNAL MEDICINE
Payer: MEDICARE

## 2024-03-29 ENCOUNTER — APPOINTMENT (OUTPATIENT)
Dept: RADIOLOGY | Facility: HOSPITAL | Age: 69
DRG: 190 | End: 2024-03-29
Payer: MEDICARE

## 2024-03-29 ENCOUNTER — APPOINTMENT (OUTPATIENT)
Dept: CARDIOLOGY | Facility: HOSPITAL | Age: 69
DRG: 190 | End: 2024-03-29
Payer: MEDICARE

## 2024-03-29 DIAGNOSIS — R91.1 LESION OF LUNG: ICD-10-CM

## 2024-03-29 DIAGNOSIS — Z86.39 HISTORY OF DIABETES MELLITUS: ICD-10-CM

## 2024-03-29 DIAGNOSIS — R73.9 BLOOD GLUCOSE ELEVATED: ICD-10-CM

## 2024-03-29 DIAGNOSIS — N30.90 CYSTITIS: ICD-10-CM

## 2024-03-29 DIAGNOSIS — R06.02 SHORTNESS OF BREATH: Primary | ICD-10-CM

## 2024-03-29 DIAGNOSIS — N20.0 RENAL STONE: ICD-10-CM

## 2024-03-29 DIAGNOSIS — R79.89 ELEVATED TROPONIN: ICD-10-CM

## 2024-03-29 DIAGNOSIS — R91.8 PULMONARY NODULES/LESIONS, MULTIPLE: ICD-10-CM

## 2024-03-29 DIAGNOSIS — E11.65 HYPERGLYCEMIA DUE TO DIABETES MELLITUS (MULTI): ICD-10-CM

## 2024-03-29 DIAGNOSIS — E11.65 TYPE 2 DIABETES MELLITUS WITH HYPERGLYCEMIA, WITHOUT LONG-TERM CURRENT USE OF INSULIN (MULTI): Chronic | ICD-10-CM

## 2024-03-29 DIAGNOSIS — N17.9 ACUTE KIDNEY INJURY (CMS-HCC): ICD-10-CM

## 2024-03-29 LAB
ALBUMIN SERPL BCP-MCNC: 2.5 G/DL (ref 3.4–5)
ALBUMIN SERPL BCP-MCNC: NORMAL G/DL
ALP SERPL-CCNC: 78 U/L (ref 33–136)
ALP SERPL-CCNC: NORMAL U/L
ALT SERPL W P-5'-P-CCNC: 255 U/L (ref 7–45)
ALT SERPL W P-5'-P-CCNC: NORMAL U/L
ANION GAP BLDV CALCULATED.4IONS-SCNC: 10 MMOL/L (ref 10–25)
ANION GAP SERPL CALC-SCNC: 18 MMOL/L (ref 10–20)
ANION GAP SERPL CALC-SCNC: NORMAL MMOL/L
APPEARANCE UR: ABNORMAL
AST SERPL W P-5'-P-CCNC: 192 U/L (ref 9–39)
AST SERPL W P-5'-P-CCNC: NORMAL U/L
ATRIAL RATE: 100 BPM
BACTERIA #/AREA URNS AUTO: ABNORMAL /HPF
BASE EXCESS BLDV CALC-SCNC: -0.1 MMOL/L (ref -2–3)
BASOPHILS # BLD AUTO: 0.03 X10*3/UL (ref 0–0.1)
BASOPHILS # BLD AUTO: 0.03 X10*3/UL (ref 0–0.1)
BASOPHILS NFR BLD AUTO: 0.2 %
BASOPHILS NFR BLD AUTO: 0.3 %
BILIRUB SERPL-MCNC: 0.8 MG/DL (ref 0–1.2)
BILIRUB SERPL-MCNC: NORMAL MG/DL
BILIRUB UR STRIP.AUTO-MCNC: NEGATIVE MG/DL
BNP SERPL-MCNC: 44 PG/ML (ref 0–99)
BODY TEMPERATURE: 37 DEGREES CELSIUS
BUN SERPL-MCNC: 21 MG/DL (ref 6–23)
BUN SERPL-MCNC: NORMAL MG/DL
BURR CELLS BLD QL SMEAR: NORMAL
CA-I BLDV-SCNC: 1.15 MMOL/L (ref 1.1–1.33)
CALCIUM SERPL-MCNC: 6.7 MG/DL (ref 8.6–10.3)
CALCIUM SERPL-MCNC: NORMAL MG/DL
CARDIAC TROPONIN I PNL SERPL HS: 114 NG/L (ref 0–13)
CARDIAC TROPONIN I PNL SERPL HS: 97 NG/L (ref 0–13)
CHLORIDE BLDV-SCNC: 98 MMOL/L (ref 98–107)
CHLORIDE SERPL-SCNC: 103 MMOL/L (ref 98–107)
CHLORIDE SERPL-SCNC: NORMAL MMOL/L
CO2 SERPL-SCNC: 17 MMOL/L (ref 21–32)
CO2 SERPL-SCNC: NORMAL MMOL/L
COLOR UR: COLORLESS
CREAT SERPL-MCNC: 2.43 MG/DL (ref 0.5–1.05)
CREAT SERPL-MCNC: NORMAL MG/DL
EGFRCR SERPLBLD CKD-EPI 2021: 21 ML/MIN/1.73M*2
EGFRCR SERPLBLD CKD-EPI 2021: NORMAL ML/MIN/{1.73_M2}
EOSINOPHIL # BLD AUTO: 0.04 X10*3/UL (ref 0–0.7)
EOSINOPHIL # BLD AUTO: 0.06 X10*3/UL (ref 0–0.7)
EOSINOPHIL NFR BLD AUTO: 0.3 %
EOSINOPHIL NFR BLD AUTO: 0.5 %
ERYTHROCYTE [DISTWIDTH] IN BLOOD BY AUTOMATED COUNT: 18.5 % (ref 11.5–14.5)
ERYTHROCYTE [DISTWIDTH] IN BLOOD BY AUTOMATED COUNT: 19 % (ref 11.5–14.5)
FLUAV RNA RESP QL NAA+PROBE: NOT DETECTED
FLUBV RNA RESP QL NAA+PROBE: NOT DETECTED
GLUCOSE BLD MANUAL STRIP-MCNC: 421 MG/DL (ref 74–99)
GLUCOSE BLD MANUAL STRIP-MCNC: 448 MG/DL (ref 74–99)
GLUCOSE BLDV-MCNC: ABNORMAL MG/DL
GLUCOSE SERPL-MCNC: 493 MG/DL (ref 74–99)
GLUCOSE SERPL-MCNC: NORMAL MG/DL
GLUCOSE UR STRIP.AUTO-MCNC: ABNORMAL MG/DL
HCO3 BLDV-SCNC: 25.4 MMOL/L (ref 22–26)
HCT VFR BLD AUTO: 35.7 % (ref 36–46)
HCT VFR BLD AUTO: 41.8 % (ref 36–46)
HCT VFR BLD EST: 38 % (ref 36–46)
HGB BLD-MCNC: 11.1 G/DL (ref 12–16)
HGB BLD-MCNC: 13.2 G/DL (ref 12–16)
HGB BLDV-MCNC: 12.7 G/DL (ref 12–16)
IMM GRANULOCYTES # BLD AUTO: 0.05 X10*3/UL (ref 0–0.7)
IMM GRANULOCYTES # BLD AUTO: 0.07 X10*3/UL (ref 0–0.7)
IMM GRANULOCYTES NFR BLD AUTO: 0.4 % (ref 0–0.9)
IMM GRANULOCYTES NFR BLD AUTO: 0.5 % (ref 0–0.9)
INHALED O2 CONCENTRATION: 21 %
KETONES UR STRIP.AUTO-MCNC: NEGATIVE MG/DL
LACTATE BLDV-SCNC: 3.5 MMOL/L (ref 0.4–2)
LEUKOCYTE ESTERASE UR QL STRIP.AUTO: ABNORMAL
LYMPHOCYTES # BLD AUTO: 2.42 X10*3/UL (ref 1.2–4.8)
LYMPHOCYTES # BLD AUTO: 2.66 X10*3/UL (ref 1.2–4.8)
LYMPHOCYTES NFR BLD AUTO: 17.7 %
LYMPHOCYTES NFR BLD AUTO: 23.4 %
MAGNESIUM SERPL-MCNC: 2 MG/DL (ref 1.6–2.4)
MAGNESIUM SERPL-MCNC: 2.1 MG/DL (ref 1.6–2.4)
MAGNESIUM SERPL-MCNC: NORMAL MG/DL
MCH RBC QN AUTO: 20.6 PG (ref 26–34)
MCH RBC QN AUTO: 20.6 PG (ref 26–34)
MCHC RBC AUTO-ENTMCNC: 31.1 G/DL (ref 32–36)
MCHC RBC AUTO-ENTMCNC: 31.6 G/DL (ref 32–36)
MCV RBC AUTO: 65 FL (ref 80–100)
MCV RBC AUTO: 66 FL (ref 80–100)
MONOCYTES # BLD AUTO: 0.81 X10*3/UL (ref 0.1–1)
MONOCYTES # BLD AUTO: 0.81 X10*3/UL (ref 0.1–1)
MONOCYTES NFR BLD AUTO: 5.9 %
MONOCYTES NFR BLD AUTO: 7.1 %
MUCOUS THREADS #/AREA URNS AUTO: ABNORMAL /LPF
NEUTROPHILS # BLD AUTO: 10.29 X10*3/UL (ref 1.2–7.7)
NEUTROPHILS # BLD AUTO: 7.78 X10*3/UL (ref 1.2–7.7)
NEUTROPHILS NFR BLD AUTO: 68.3 %
NEUTROPHILS NFR BLD AUTO: 75.4 %
NITRITE UR QL STRIP.AUTO: NEGATIVE
NRBC BLD-RTO: 0 /100 WBCS (ref 0–0)
NRBC BLD-RTO: 0 /100 WBCS (ref 0–0)
OVALOCYTES BLD QL SMEAR: NORMAL
OXYHGB MFR BLDV: 50.4 % (ref 45–75)
P AXIS: 36 DEGREES
P OFFSET: 197 MS
P ONSET: 154 MS
PCO2 BLDV: 44 MM HG (ref 41–51)
PH BLDV: 7.37 PH (ref 7.33–7.43)
PH UR STRIP.AUTO: 6 [PH]
PLATELET # BLD AUTO: 70 X10*3/UL (ref 150–450)
PLATELET # BLD AUTO: 81 X10*3/UL (ref 150–450)
PO2 BLDV: 34 MM HG (ref 35–45)
POLYCHROMASIA BLD QL SMEAR: NORMAL
POTASSIUM BLDV-SCNC: 2.5 MMOL/L (ref 3.5–5.3)
POTASSIUM SERPL-SCNC: 2.7 MMOL/L (ref 3.5–5.3)
POTASSIUM SERPL-SCNC: 4.7 MMOL/L (ref 3.5–5.3)
POTASSIUM SERPL-SCNC: NORMAL MMOL/L
PR INTERVAL: 128 MS
PROT SERPL-MCNC: 4.8 G/DL (ref 6.4–8.2)
PROT SERPL-MCNC: NORMAL G/DL
PROT UR STRIP.AUTO-MCNC: ABNORMAL MG/DL
Q ONSET: 218 MS
QRS COUNT: 16 BEATS
QRS DURATION: 88 MS
QT INTERVAL: 360 MS
QTC CALCULATION(BAZETT): 464 MS
QTC FREDERICIA: 427 MS
R AXIS: -6 DEGREES
RBC # BLD AUTO: 5.38 X10*6/UL (ref 4–5.2)
RBC # BLD AUTO: 6.42 X10*6/UL (ref 4–5.2)
RBC # UR STRIP.AUTO: ABNORMAL /UL
RBC #/AREA URNS AUTO: >20 /HPF
RBC MORPH BLD: NORMAL
RSV RNA RESP QL NAA+PROBE: NOT DETECTED
SAO2 % BLDV: 52 % (ref 45–75)
SARS-COV-2 RNA RESP QL NAA+PROBE: NOT DETECTED
SCHISTOCYTES BLD QL SMEAR: NORMAL
SODIUM BLDV-SCNC: 131 MMOL/L (ref 136–145)
SODIUM SERPL-SCNC: 133 MMOL/L (ref 136–145)
SODIUM SERPL-SCNC: NORMAL MMOL/L
SP GR UR STRIP.AUTO: 1.02
SQUAMOUS #/AREA URNS AUTO: ABNORMAL /HPF
T AXIS: 32 DEGREES
T OFFSET: 398 MS
TARGETS BLD QL SMEAR: NORMAL
TRANS CELLS #/AREA UR COMP ASSIST: ABNORMAL /HPF
UROBILINOGEN UR STRIP.AUTO-MCNC: NORMAL MG/DL
VENTRICULAR RATE: 100 BPM
WBC # BLD AUTO: 11.4 X10*3/UL (ref 4.4–11.3)
WBC # BLD AUTO: 13.7 X10*3/UL (ref 4.4–11.3)
WBC #/AREA URNS AUTO: >50 /HPF

## 2024-03-29 PROCEDURE — 84132 ASSAY OF SERUM POTASSIUM: CPT

## 2024-03-29 PROCEDURE — 87634 RSV DNA/RNA AMP PROBE: CPT

## 2024-03-29 PROCEDURE — 99285 EMERGENCY DEPT VISIT HI MDM: CPT | Mod: 25

## 2024-03-29 PROCEDURE — 96365 THER/PROPH/DIAG IV INF INIT: CPT

## 2024-03-29 PROCEDURE — 94640 AIRWAY INHALATION TREATMENT: CPT

## 2024-03-29 PROCEDURE — 71250 CT THORAX DX C-: CPT | Mod: FOREIGN READ | Performed by: RADIOLOGY

## 2024-03-29 PROCEDURE — 71250 CT THORAX DX C-: CPT

## 2024-03-29 PROCEDURE — 82947 ASSAY GLUCOSE BLOOD QUANT: CPT

## 2024-03-29 PROCEDURE — 81001 URINALYSIS AUTO W/SCOPE: CPT

## 2024-03-29 PROCEDURE — 71046 X-RAY EXAM CHEST 2 VIEWS: CPT

## 2024-03-29 PROCEDURE — 84132 ASSAY OF SERUM POTASSIUM: CPT | Performed by: EMERGENCY MEDICINE

## 2024-03-29 PROCEDURE — 83735 ASSAY OF MAGNESIUM: CPT | Performed by: NURSE PRACTITIONER

## 2024-03-29 PROCEDURE — 36415 COLL VENOUS BLD VENIPUNCTURE: CPT

## 2024-03-29 PROCEDURE — 2500000002 HC RX 250 W HCPCS SELF ADMINISTERED DRUGS (ALT 637 FOR MEDICARE OP, ALT 636 FOR OP/ED): Mod: MUE

## 2024-03-29 PROCEDURE — 36415 COLL VENOUS BLD VENIPUNCTURE: CPT | Performed by: EMERGENCY MEDICINE

## 2024-03-29 PROCEDURE — 93005 ELECTROCARDIOGRAM TRACING: CPT

## 2024-03-29 PROCEDURE — 71046 X-RAY EXAM CHEST 2 VIEWS: CPT | Mod: FOREIGN READ | Performed by: RADIOLOGY

## 2024-03-29 PROCEDURE — 2500000004 HC RX 250 GENERAL PHARMACY W/ HCPCS (ALT 636 FOR OP/ED): Performed by: NURSE PRACTITIONER

## 2024-03-29 PROCEDURE — 84484 ASSAY OF TROPONIN QUANT: CPT

## 2024-03-29 PROCEDURE — 2500000002 HC RX 250 W HCPCS SELF ADMINISTERED DRUGS (ALT 637 FOR MEDICARE OP, ALT 636 FOR OP/ED): Mod: MUE | Performed by: PHARMACIST

## 2024-03-29 PROCEDURE — 2500000004 HC RX 250 GENERAL PHARMACY W/ HCPCS (ALT 636 FOR OP/ED): Performed by: EMERGENCY MEDICINE

## 2024-03-29 PROCEDURE — 2500000004 HC RX 250 GENERAL PHARMACY W/ HCPCS (ALT 636 FOR OP/ED)

## 2024-03-29 PROCEDURE — 1100000001 HC PRIVATE ROOM DAILY

## 2024-03-29 PROCEDURE — 85025 COMPLETE CBC W/AUTO DIFF WBC: CPT | Performed by: EMERGENCY MEDICINE

## 2024-03-29 PROCEDURE — 80053 COMPREHEN METABOLIC PANEL: CPT | Performed by: EMERGENCY MEDICINE

## 2024-03-29 PROCEDURE — 2500000001 HC RX 250 WO HCPCS SELF ADMINISTERED DRUGS (ALT 637 FOR MEDICARE OP): Performed by: EMERGENCY MEDICINE

## 2024-03-29 PROCEDURE — 83880 ASSAY OF NATRIURETIC PEPTIDE: CPT | Performed by: EMERGENCY MEDICINE

## 2024-03-29 PROCEDURE — 94640 AIRWAY INHALATION TREATMENT: CPT | Mod: MUE

## 2024-03-29 PROCEDURE — 2500000002 HC RX 250 W HCPCS SELF ADMINISTERED DRUGS (ALT 637 FOR MEDICARE OP, ALT 636 FOR OP/ED): Mod: MUE | Performed by: NURSE PRACTITIONER

## 2024-03-29 PROCEDURE — 83735 ASSAY OF MAGNESIUM: CPT | Performed by: EMERGENCY MEDICINE

## 2024-03-29 PROCEDURE — 2500000001 HC RX 250 WO HCPCS SELF ADMINISTERED DRUGS (ALT 637 FOR MEDICARE OP): Performed by: NURSE PRACTITIONER

## 2024-03-29 PROCEDURE — 87636 SARSCOV2 & INF A&B AMP PRB: CPT | Performed by: EMERGENCY MEDICINE

## 2024-03-29 PROCEDURE — 99223 1ST HOSP IP/OBS HIGH 75: CPT | Performed by: NURSE PRACTITIONER

## 2024-03-29 PROCEDURE — 83735 ASSAY OF MAGNESIUM: CPT

## 2024-03-29 PROCEDURE — 80053 COMPREHEN METABOLIC PANEL: CPT

## 2024-03-29 RX ORDER — INSULIN LISPRO 100 [IU]/ML
0-10 INJECTION, SOLUTION INTRAVENOUS; SUBCUTANEOUS
Status: DISCONTINUED | OUTPATIENT
Start: 2024-03-29 | End: 2024-03-29

## 2024-03-29 RX ORDER — ALBUTEROL SULFATE 0.83 MG/ML
2.5 SOLUTION RESPIRATORY (INHALATION) EVERY 6 HOURS PRN
Status: DISCONTINUED | OUTPATIENT
Start: 2024-03-29 | End: 2024-04-04 | Stop reason: HOSPADM

## 2024-03-29 RX ORDER — CEFTRIAXONE 1 G/50ML
1 INJECTION, SOLUTION INTRAVENOUS ONCE
Status: COMPLETED | OUTPATIENT
Start: 2024-03-29 | End: 2024-03-29

## 2024-03-29 RX ORDER — CHOLECALCIFEROL (VITAMIN D3) 25 MCG
2000 TABLET ORAL DAILY
Status: DISCONTINUED | OUTPATIENT
Start: 2024-03-29 | End: 2024-04-04 | Stop reason: HOSPADM

## 2024-03-29 RX ORDER — BUDESONIDE 0.5 MG/2ML
0.5 INHALANT ORAL
Status: DISCONTINUED | OUTPATIENT
Start: 2024-03-29 | End: 2024-04-04 | Stop reason: HOSPADM

## 2024-03-29 RX ORDER — METOPROLOL TARTRATE 50 MG/1
50 TABLET ORAL 2 TIMES DAILY
Status: DISCONTINUED | OUTPATIENT
Start: 2024-03-29 | End: 2024-04-04 | Stop reason: HOSPADM

## 2024-03-29 RX ORDER — ROSUVASTATIN CALCIUM 20 MG/1
40 TABLET, COATED ORAL NIGHTLY
Status: DISCONTINUED | OUTPATIENT
Start: 2024-03-29 | End: 2024-03-29

## 2024-03-29 RX ORDER — SODIUM CHLORIDE, SODIUM LACTATE, POTASSIUM CHLORIDE, CALCIUM CHLORIDE 600; 310; 30; 20 MG/100ML; MG/100ML; MG/100ML; MG/100ML
50 INJECTION, SOLUTION INTRAVENOUS CONTINUOUS
Status: DISCONTINUED | OUTPATIENT
Start: 2024-03-29 | End: 2024-04-03

## 2024-03-29 RX ORDER — DEXTROSE 50 % IN WATER (D50W) INTRAVENOUS SYRINGE
12.5
Status: DISCONTINUED | OUTPATIENT
Start: 2024-03-29 | End: 2024-04-04 | Stop reason: HOSPADM

## 2024-03-29 RX ORDER — POTASSIUM CHLORIDE 20 MEQ/1
40 TABLET, EXTENDED RELEASE ORAL ONCE
Status: COMPLETED | OUTPATIENT
Start: 2024-03-29 | End: 2024-03-29

## 2024-03-29 RX ORDER — IPRATROPIUM BROMIDE AND ALBUTEROL SULFATE 2.5; .5 MG/3ML; MG/3ML
3 SOLUTION RESPIRATORY (INHALATION) 4 TIMES DAILY PRN
Status: DISCONTINUED | OUTPATIENT
Start: 2024-03-29 | End: 2024-04-04 | Stop reason: HOSPADM

## 2024-03-29 RX ORDER — FORMOTEROL FUMARATE DIHYDRATE 20 UG/2ML
20 SOLUTION RESPIRATORY (INHALATION)
Status: DISCONTINUED | OUTPATIENT
Start: 2024-03-29 | End: 2024-04-04 | Stop reason: HOSPADM

## 2024-03-29 RX ORDER — CEFTRIAXONE 1 G/50ML
1 INJECTION, SOLUTION INTRAVENOUS EVERY 24 HOURS
Status: DISCONTINUED | OUTPATIENT
Start: 2024-03-30 | End: 2024-04-01

## 2024-03-29 RX ORDER — ROSUVASTATIN CALCIUM 10 MG/1
10 TABLET, COATED ORAL NIGHTLY
Status: DISCONTINUED | OUTPATIENT
Start: 2024-03-29 | End: 2024-04-04 | Stop reason: HOSPADM

## 2024-03-29 RX ORDER — INSULIN LISPRO 100 [IU]/ML
0-10 INJECTION, SOLUTION INTRAVENOUS; SUBCUTANEOUS EVERY 4 HOURS
Status: DISCONTINUED | OUTPATIENT
Start: 2024-03-29 | End: 2024-04-02

## 2024-03-29 RX ORDER — IPRATROPIUM BROMIDE AND ALBUTEROL SULFATE 2.5; .5 MG/3ML; MG/3ML
3 SOLUTION RESPIRATORY (INHALATION)
Status: COMPLETED | OUTPATIENT
Start: 2024-03-29 | End: 2024-03-29

## 2024-03-29 RX ORDER — POTASSIUM CHLORIDE 1.5 G/1.58G
40 POWDER, FOR SOLUTION ORAL ONCE
Status: COMPLETED | OUTPATIENT
Start: 2024-03-29 | End: 2024-03-29

## 2024-03-29 RX ORDER — ALBUTEROL SULFATE 90 UG/1
1 AEROSOL, METERED RESPIRATORY (INHALATION) EVERY 6 HOURS PRN
Status: DISCONTINUED | OUTPATIENT
Start: 2024-03-29 | End: 2024-03-29

## 2024-03-29 RX ORDER — DEXTROSE 50 % IN WATER (D50W) INTRAVENOUS SYRINGE
25
Status: DISCONTINUED | OUTPATIENT
Start: 2024-03-29 | End: 2024-04-03

## 2024-03-29 RX ORDER — POTASSIUM CHLORIDE 14.9 MG/ML
20 INJECTION INTRAVENOUS
Status: DISCONTINUED | OUTPATIENT
Start: 2024-03-29 | End: 2024-03-29

## 2024-03-29 RX ORDER — FLUTICASONE FUROATE AND VILANTEROL 200; 25 UG/1; UG/1
1 POWDER RESPIRATORY (INHALATION)
Status: DISCONTINUED | OUTPATIENT
Start: 2024-03-29 | End: 2024-03-29

## 2024-03-29 RX ORDER — HEPARIN SODIUM 5000 [USP'U]/ML
5000 INJECTION, SOLUTION INTRAVENOUS; SUBCUTANEOUS EVERY 8 HOURS SCHEDULED
Status: DISCONTINUED | OUTPATIENT
Start: 2024-03-29 | End: 2024-04-04 | Stop reason: HOSPADM

## 2024-03-29 RX ORDER — NAPROXEN SODIUM 220 MG/1
81 TABLET, FILM COATED ORAL DAILY
Status: DISCONTINUED | OUTPATIENT
Start: 2024-03-29 | End: 2024-04-04 | Stop reason: HOSPADM

## 2024-03-29 RX ADMIN — HEPARIN SODIUM 5000 UNITS: 5000 INJECTION INTRAVENOUS; SUBCUTANEOUS at 22:50

## 2024-03-29 RX ADMIN — POTASSIUM CHLORIDE 20 MEQ: 14.9 INJECTION, SOLUTION INTRAVENOUS at 16:21

## 2024-03-29 RX ADMIN — FORMOTEROL FUMARATE DIHYDRATE 20 MCG: 20 SOLUTION RESPIRATORY (INHALATION) at 19:17

## 2024-03-29 RX ADMIN — Medication 2000 UNITS: at 18:43

## 2024-03-29 RX ADMIN — INSULIN LISPRO 10 UNITS: 100 INJECTION, SOLUTION INTRAVENOUS; SUBCUTANEOUS at 18:16

## 2024-03-29 RX ADMIN — IPRATROPIUM BROMIDE AND ALBUTEROL SULFATE 3 ML: .5; 3 SOLUTION RESPIRATORY (INHALATION) at 10:11

## 2024-03-29 RX ADMIN — SODIUM CHLORIDE 1000 ML: 9 INJECTION, SOLUTION INTRAVENOUS at 14:54

## 2024-03-29 RX ADMIN — CEFTRIAXONE SODIUM 1 G: 1 INJECTION, SOLUTION INTRAVENOUS at 11:35

## 2024-03-29 RX ADMIN — IPRATROPIUM BROMIDE AND ALBUTEROL SULFATE 3 ML: .5; 3 SOLUTION RESPIRATORY (INHALATION) at 10:28

## 2024-03-29 RX ADMIN — IPRATROPIUM BROMIDE AND ALBUTEROL SULFATE 3 ML: .5; 3 SOLUTION RESPIRATORY (INHALATION) at 10:05

## 2024-03-29 RX ADMIN — ROSUVASTATIN CALCIUM 10 MG: 10 TABLET, FILM COATED ORAL at 22:54

## 2024-03-29 RX ADMIN — SODIUM CHLORIDE, POTASSIUM CHLORIDE, SODIUM LACTATE AND CALCIUM CHLORIDE 75 ML/HR: 600; 310; 30; 20 INJECTION, SOLUTION INTRAVENOUS at 18:44

## 2024-03-29 RX ADMIN — BUDESONIDE INHALATION 0.5 MG: 0.5 SUSPENSION RESPIRATORY (INHALATION) at 19:16

## 2024-03-29 RX ADMIN — IPRATROPIUM BROMIDE AND ALBUTEROL SULFATE 3 ML: 2.5; .5 SOLUTION RESPIRATORY (INHALATION) at 19:17

## 2024-03-29 RX ADMIN — METOPROLOL TARTRATE 50 MG: 50 TABLET, FILM COATED ORAL at 22:53

## 2024-03-29 RX ADMIN — SODIUM CHLORIDE 1000 ML: 9 INJECTION, SOLUTION INTRAVENOUS at 10:26

## 2024-03-29 RX ADMIN — ASPIRIN 81 MG 81 MG: 81 TABLET ORAL at 18:44

## 2024-03-29 RX ADMIN — POTASSIUM CHLORIDE 40 MEQ: 1500 TABLET, EXTENDED RELEASE ORAL at 22:54

## 2024-03-29 RX ADMIN — POTASSIUM CHLORIDE 40 MEQ: 1.5 POWDER, FOR SOLUTION ORAL at 16:16

## 2024-03-29 RX ADMIN — HEPARIN SODIUM 5000 UNITS: 5000 INJECTION INTRAVENOUS; SUBCUTANEOUS at 18:43

## 2024-03-29 ASSESSMENT — COGNITIVE AND FUNCTIONAL STATUS - GENERAL
CLIMB 3 TO 5 STEPS WITH RAILING: A LOT
HELP NEEDED FOR BATHING: A LITTLE
EATING MEALS: A LITTLE
WALKING IN HOSPITAL ROOM: A LOT
MOVING TO AND FROM BED TO CHAIR: A LITTLE
TURNING FROM BACK TO SIDE WHILE IN FLAT BAD: A LITTLE
MOVING FROM LYING ON BACK TO SITTING ON SIDE OF FLAT BED WITH BEDRAILS: A LITTLE
DAILY ACTIVITIY SCORE: 17
TOILETING: A LOT
DRESSING REGULAR LOWER BODY CLOTHING: A LITTLE
STANDING UP FROM CHAIR USING ARMS: A LOT
DRESSING REGULAR UPPER BODY CLOTHING: A LITTLE
PERSONAL GROOMING: A LITTLE
MOBILITY SCORE: 15

## 2024-03-29 ASSESSMENT — PAIN SCALES - GENERAL
PAINLEVEL_OUTOF10: 0 - NO PAIN

## 2024-03-29 ASSESSMENT — ENCOUNTER SYMPTOMS
NEUROLOGICAL NEGATIVE: 1
EYES NEGATIVE: 1
PSYCHIATRIC NEGATIVE: 1
ENDOCRINE NEGATIVE: 1
APPETITE CHANGE: 1
MUSCULOSKELETAL NEGATIVE: 1
FATIGUE: 1
SHORTNESS OF BREATH: 1
CARDIOVASCULAR NEGATIVE: 1
GASTROINTESTINAL NEGATIVE: 1

## 2024-03-29 ASSESSMENT — PAIN - FUNCTIONAL ASSESSMENT
PAIN_FUNCTIONAL_ASSESSMENT: 0-10

## 2024-03-29 NOTE — PROGRESS NOTES
Pharmacy Medication History Review    Yesy Ayala is a 68 y.o. female admitted for No Principal Problem: There is no principal problem currently on the Problem List. Please update the Problem List and refresh.. Pharmacy reviewed the patient's [id number] medications and allergies for accuracy.    The list below reflectives the updated PTA list. Please review each medication in order reconciliation for additional clarification and justification.  Prior to Admission medications    Medication Sig Start Date End Date Taking? Authorizing Provider        MD Lambert Rockwell, DO        Historical Provider, MD Urban Hardy, DO        Historical Provider, MD Urban Hammond, DO        Historical Provider, MD Jovana Giang, APRN-CNP        Carlos Alberto Vasquez MD        Historical Provider, MD        The list below reflectives the updated allergy list. Please review each documented allergy for additional clarification and justification.  Allergies  Reviewed by Mei Sanabria RN on 3/29/2024        Severity Reactions Comments    Penicillin High Anaphylaxis     Metformin Not Specified Dizziness, Nausea Only     Sulfa (sulfonamide Antibiotics) Low Hives, Itching, Rash             Below are additional concerns with the patient's PTA list.  Prior to Admission Medications   Prescriptions Last Dose Informant   albuterol 90 mcg/actuation inhaler  Self   Sig: Inhale 1 puff every 6 hours if needed for shortness of breath.              aspirin 81 mg EC tablet 3/28/2024 Self   Sig: Take 1 tablet (81 mg) by mouth once daily.   blood-glucose meter misc     Sig: Use daily or as directed for monitoring of diabetes.   cholecalciferol (Vitamin D-3) 50 MCG (2000 UT) tablet 3/28/2024 Self   Sig: Take 1 tablet (50 mcg) by mouth once daily.   fluticasone-umeclidin-vilanter (Trelegy Ellipta) 200-62.5-25 mcg blister with device 3/28/2024    Sig: Inhale 1 puff once daily  in the morning. Take before meals.              ipratropium-albuteroL (Duo-Neb) 0.5-2.5 mg/3 mL nebulizer solution  Self   Sig: Take 3 mL by nebulization 4 times a day as needed for shortness of breath or wheezing.   metoprolol tartrate (Lopressor) 100 mg tablet 3/28/2024 Self   Sig: TAKE 1/2 TABLET BY MOUTH TWICE DAILY   rosuvastatin (Crestor) 40 mg tablet 3/28/2024 Self   Sig: TAKE 1 TABLET EVERYDAY AT BEDTIME      Facility-Administered Medications: None      Per patient. Had bottles and knew her meds. For the Duoneb she said she has the medicine but can't use it as doesn't have a machine.     Joyce Jimenes

## 2024-03-29 NOTE — ED TRIAGE NOTES
Pt is coming in because her whole body is weak and she hasn't been eating the past week. States she hasn't really been taking care of herself in the last week since her mother has passed. Shortness of breath occurs when walking. States she has been having increased need to sleep. Recently seen for RSV but at this time states she has no other medical history.

## 2024-03-29 NOTE — ED PROVIDER NOTES
HPI   Chief Complaint   Patient presents with    Weakness, Gen       68-year-old female past medical history of type 2 diabetes, HTN, HLD, peripheral vascular disease, CAD with stent placement, CKD stage III, COPD, history of pulmonary nodules presents the ED today with a chief concern of generalized weakness.  Patient states symptoms started about 1 week ago.  States that she has been very sleepy and has decreased appetite.  She states that she has been getting shortness of breath with walking as well.  She states that this all started about the same time that she started feeling very weak.  She denies any associated chest pain.  Denies syncope.  Denies numbness or tingling.  Denies unilateral symptoms.  Denies any lightheadedness, dizziness, or imbalance.  Denies fever/chills, nausea/vomiting, diarrhea, hematochezia, melena.  Denies abdominal pain.  She states that her mother did recently passed away and she is attributing her symptoms to this.  She did have RSV 2 months ago, states that her symptoms started 1 week ago.  She has no other symptoms or concerns at this time.      History provided by:  Patient   used: No                        Frances Coma Scale Score: 15                     Patient History   Past Medical History:   Diagnosis Date    Body mass index (BMI) 27.0-27.9, adult 01/18/2020    BMI 27.0-27.9,adult    Other conditions influencing health status     Coronary Artery Disease    Personal history of other diseases of the circulatory system     History of hypertension    Personal history of other diseases of the circulatory system     History of intermittent claudication    Personal history of other diseases of the circulatory system     History of peripheral vascular disease    Personal history of other endocrine, nutritional and metabolic disease     History of hyperlipidemia     Past Surgical History:   Procedure Laterality Date    OTHER SURGICAL HISTORY  08/22/2013     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    OTHER SURGICAL HISTORY  08/22/2013    Bypass Graft (Non-Vein) Aortic-bifemoral    OTHER SURGICAL HISTORY  02/20/2014    Previous Stent Placement     Family History   Problem Relation Name Age of Onset    COPD Mother      No Known Problems Father      No Known Problems Sister      No Known Problems Brother       Social History     Tobacco Use    Smoking status: Some Days     Packs/day: 0.50     Years: 40.00     Additional pack years: 0.00     Total pack years: 20.00     Types: Cigarettes     Passive exposure: Never    Smokeless tobacco: Never   Vaping Use    Vaping Use: Never used   Substance Use Topics    Alcohol use: Not Currently     Comment: social    Drug use: Not Currently     Types: Marijuana     Comment: Long time ago       Physical Exam   ED Triage Vitals [03/29/24 0913]   Temperature Heart Rate Respirations BP   37.2 °C (99 °F) (!) 103 20 106/75      Pulse Ox Temp Source Heart Rate Source Patient Position   99 % Oral -- --      BP Location FiO2 (%)     -- --       Physical Exam  Constitutional: Vital signs per nursing notes.  Well developed, well nourished.  No acute distress.    Psychiatric: alert and oriented to person, place, and time; no abnormalities of mood or affect; memory intact  Eyes: PERRL; conjunctivae and lids normal; EOMI  ENT: Ears normal externally; face symmetric. voice normal  Neck: neck supple, no meningismus; trachea midline without deviation  Respiratory: normal respiratory effort and excursion; no rales, rhonchi, or wheezes; equal air entry  Cardiovascular: RRR, 2+ radial and dorsalis pedis pulses extremities   Neurological: normal speech; CN II-XII grossly intact, normal motor and sensory function  GI: no distention, soft, nontender.  No rebound tenderness or guarding.  Peripheral vascular: No peripheral edema.  No calf tenderness.  No palpable cords.  : Deferred  Musculoskeletal: normal gait and station; normal digits  and nails; normal to palpation; normal strength/tone; neurovascular status intact.  Skin: normal to inspection; normal to palpation; no rash.  Lips are dry.    ED Course & MDM   ED Course as of 03/29/24 1621   Fri Mar 29, 2024   1019 Influenza, COVID-negative [MC]   1021 Ventricular rate 100 bpm.  SD interval 120 ms.  QRS duration 88 ms.  QT/QTc 360/464 ms.  Patient is in normal sinus rhythm and ventricular rate of 100 bpm.  Normal axis.  There is good R wave progression.  No right or left bundle-branch block.  No ST elevations.  PVC noted.  Compared with multiple previous EKGs grossly unchanged. [MC]   1101 FINDINGS:  CARDIOMEDIASTINAL SILHOUETTE:  Cardiomediastinal silhouette is normal in size and configuration.     LUNGS:  Lungs are clear.     ABDOMEN:  No remarkable upper abdominal findings.     BONES:  No acute osseous changes.  IMPRESSION:  No acute cardiopulmonary abnormality.  Signed by Chas Puente MD   [MC]   1218 CBC shows white blood cell count of 11.4.  Hemoglobin is 11.1.  MCV and MCHC low concerning for hypochromic microcytic anemia.  CMP shows no AMARILYS or acute liver injury.  Urinalysis shows evidence of urinary tract infection with over 50 white blood cells in the urine. [MC]   1407 Patient felt a little better after administration of DuoNeb.  She was given a liter of fluids in the ED as well.  She was also given ceftriaxone. [MC]   1420 IMPRESSION:  1. Emphysematous changes are present. Subtle density RIGHT upper lobe  appears to be present which appears to be cavitary. Follow-up PET scan  and/or tissue sampling recommended for further assessment.  2. Scattered micronodules are stable.  3. Nonobstructing left-sided nephrolithiasis.  4. Atherosclerosis of the thoracic aorta and coronary arteries is  present.  Signed by Hoang Oneill II, MD   [MC]   1422 Patient was given a printed out copy of her CT findings [MC]   1432 Initial troponin 114.  Repeat troponin 97.  pH normal. [MC]   1433 No ketonuria  []   1441 Spoke with BR[facility] Sergey who accepts admission of this pt []      ED Course User Index  [] Kumar Villa PA-C         Diagnoses as of 03/29/24 1621   Shortness of breath   Elevated troponin   Blood glucose elevated   History of diabetes mellitus   Acute kidney injury (CMS/HCC)   Cystitis   Lesion of lung   Renal stone       Medical Decision Making  68-year-old female past medical history of type 2 diabetes, HTN, HLD, peripheral vascular disease, CAD with stent placement, CKD stage III, COPD, history of pulmonary nodules presents the ED today with a chief concern of generalized weakness.  Vital signs reassuring.  Patient overall appears well and is nontoxic-appearing.  Patient has full range of motion of the neck without any meningismus.  Satting well on room air.  Her white blood cell count is slightly elevated.  She does have an acute kidney injury and was given a liter of fluids in the ED.  Her CT chest shows multiple incidental findings that she was given a printed out copy of her CT scan.  Her potassium was low so she was given 40 mill equivalents of potassium in the ED.  She was also given 10 units of subcutaneous insulin in the ED.  Her anion gap is normal.  Her pH is normal.  Her troponins are elevated, but downtrending.  Her EKG shows no ischemic changes.  I would like to order a CT angiogram, but given her creatinine I did not want to use contrast at this time.  Discussed with [facility] Mr. Rincon who accepts admission of this patient to the inpatient unit.  Discussed my impression and findings with patient she feels comfortable being admitted to the inpatient unit.    Differential diagnosis: ACS, COPD exacerbation, RSV, COVID, influenza, aortic dissection, cardiac abnormality, acute kidney injury    Disposition/treatment  1. Shortness of breath  2.  Elevated troponin  3.  Blood glucose elevated  4.  History of diabetes mellitus  5.  Acute kidney injury  6.  Cystitis  7.  Lesion of lung  8.  Renal  stone    Shared decision-making was used patient will be admitted to the inpatient unit            Procedure  Procedures     Kumar Villa PA-C  03/29/24 7150

## 2024-03-29 NOTE — H&P
History Of Present Illness  Yesy Ayala is a 68 y.o. female past medical history of type 2 diabetes, HTN, HLD, peripheral vascular disease, CAD with stent placement, CKD stage III, COPD, history of pulmonary nodules presenting with complaints of generalized weakness, malaise and shortness of breath on exertion.  Patient has been having symptoms over the past week.  Patient reports that she has not eaten in the last 4 days.  She is have been sleeping a lot and has not had much of an appetite.  Patient currently asking to eat while in emergency department which is a good sign.  Patient reports that she was just here with RSV about 2 months ago.  Patient is without fever, chills, chest pain, nausea, vomiting or diarrhea.  Patient blood glucose is elevated in the 400.  Patient states that when she was admitted for RSV she was treated with steroids at that time and had an increase in blood glucose.  Patient sodium 133, potassium 4.7 however he was hemolyzed with a repeat of 2.7 and it will be replaced.  Patient , , troponin 114, with a repeat of 97, WBC 13.7, hemoglobin 13.2, hematocrit 41.8, MSV 45.  Patient urine noted to be turbid in color, positive for glucose blood, negative for ketones positive for leukocyte Estrace, and +2 bacteria.  Patient will be admitted for further workup     Past Medical History  She has a past medical history of Body mass index (BMI) 27.0-27.9, adult (01/18/2020), Other conditions influencing health status, Personal history of other diseases of the circulatory system, Personal history of other diseases of the circulatory system, Personal history of other diseases of the circulatory system, and Personal history of other endocrine, nutritional and metabolic disease.    Surgical History  She has a past surgical history that includes Other surgical history (08/22/2013); Other surgical history (08/22/2013); and Other surgical history (02/20/2014).     Social History  She reports  that she has been smoking cigarettes. She has a 20.00 pack-year smoking history. She has never been exposed to tobacco smoke. She has never used smokeless tobacco. She reports that she does not currently use alcohol. She reports that she does not currently use drugs after having used the following drugs: Marijuana.    Family History  Family History   Problem Relation Name Age of Onset    COPD Mother      No Known Problems Father      No Known Problems Sister      No Known Problems Brother          Allergies  Penicillin, Metformin, and Sulfa (sulfonamide antibiotics)    Review of Systems   Constitutional:  Positive for appetite change and fatigue.   HENT: Negative.     Eyes: Negative.    Respiratory:  Positive for shortness of breath.    Cardiovascular: Negative.    Gastrointestinal: Negative.    Endocrine: Negative.    Genitourinary: Negative.    Musculoskeletal: Negative.    Neurological: Negative.    Psychiatric/Behavioral: Negative.          Physical Exam  HENT:      Head: Normocephalic.      Mouth/Throat:      Mouth: Mucous membranes are dry.   Cardiovascular:      Rate and Rhythm: Regular rhythm. Tachycardia present.   Pulmonary:      Effort: Pulmonary effort is normal.      Breath sounds: Normal breath sounds.   Abdominal:      General: Bowel sounds are normal.      Palpations: Abdomen is soft.   Musculoskeletal:      Cervical back: Neck supple.   Skin:     General: Skin is warm.   Neurological:      Mental Status: She is alert and oriented to person, place, and time.   Psychiatric:         Mood and Affect: Mood normal.         Behavior: Behavior normal.          Last Recorded Vitals  /70 (BP Location: Right arm, Patient Position: Lying)   Pulse 102   Temp 36.8 °C (98.2 °F) (Temporal)   Resp 20   Wt 56.2 kg (124 lb)   SpO2 96%     Relevant Results      Results for orders placed or performed during the hospital encounter of 03/29/24 (from the past 24 hour(s))   Sars-CoV-2 PCR   Result Value Ref Range     Coronavirus 2019, PCR Not Detected Not Detected   Influenza A, and B PCR   Result Value Ref Range    Flu A Result Not Detected Not Detected    Flu B Result Not Detected Not Detected   ECG 12 lead   Result Value Ref Range    Ventricular Rate 100 BPM    Atrial Rate 100 BPM    WA Interval 128 ms    QRS Duration 88 ms    QT Interval 360 ms    QTC Calculation(Bazett) 464 ms    P Axis 36 degrees    R Axis -6 degrees    T Axis 32 degrees    QRS Count 16 beats    Q Onset 218 ms    P Onset 154 ms    P Offset 197 ms    T Offset 398 ms    QTC Fredericia 427 ms   RSV PCR   Result Value Ref Range    RSV PCR Not Detected Not Detected   Comprehensive metabolic panel   Result Value Ref Range    Glucose      Sodium      Potassium      Chloride      Bicarbonate      Anion Gap      Urea Nitrogen      Creatinine      eGFR      Calcium      Albumin      Alkaline Phosphatase      Total Protein      AST      Bilirubin, Total      ALT     Magnesium   Result Value Ref Range    Magnesium     Urinalysis with Reflex Microscopic   Result Value Ref Range    Color, Urine Colorless (N) Light-Yellow, Yellow, Dark-Yellow    Appearance, Urine Turbid (N) Clear    Specific Gravity, Urine 1.017 1.005 - 1.035    pH, Urine 6.0 5.0, 5.5, 6.0, 6.5, 7.0, 7.5, 8.0    Protein, Urine 100 (2+) (A) NEGATIVE, 10 (TRACE), 20 (TRACE) mg/dL    Glucose, Urine OVER (4+) (A) Normal mg/dL    Blood, Urine OVER (3+) (A) NEGATIVE    Ketones, Urine NEGATIVE NEGATIVE mg/dL    Bilirubin, Urine NEGATIVE NEGATIVE    Urobilinogen, Urine Normal Normal mg/dL    Nitrite, Urine NEGATIVE NEGATIVE    Leukocyte Esterase, Urine 500 Petros/µL (A) NEGATIVE   Troponin I, High Sensitivity, Initial   Result Value Ref Range    Troponin I, High Sensitivity 114 (HH) 0 - 13 ng/L   Microscopic Only, Urine   Result Value Ref Range    WBC, Urine >50 (A) 1-5, NONE /HPF    RBC, Urine >20 (A) NONE, 1-2, 3-5 /HPF    Squamous Epithelial Cells, Urine 1-9 (SPARSE) Reference range not established. /HPF     Transitional Epithelial Cells, Urine 1-2 (FEW) Reference range not established. /HPF    Bacteria, Urine 2+ (A) NONE SEEN /HPF    Mucus, Urine FEW Reference range not established. /LPF   Troponin, High Sensitivity, 1 Hour   Result Value Ref Range    Troponin I, High Sensitivity 97 (HH) 0 - 13 ng/L   CBC and Auto Differential   Result Value Ref Range    WBC 11.4 (H) 4.4 - 11.3 x10*3/uL    nRBC 0.0 0.0 - 0.0 /100 WBCs    RBC 5.38 (H) 4.00 - 5.20 x10*6/uL    Hemoglobin 11.1 (L) 12.0 - 16.0 g/dL    Hematocrit 35.7 (L) 36.0 - 46.0 %    MCV 66 (L) 80 - 100 fL    MCH 20.6 (L) 26.0 - 34.0 pg    MCHC 31.1 (L) 32.0 - 36.0 g/dL    RDW 18.5 (H) 11.5 - 14.5 %    Platelets 70 (L) 150 - 450 x10*3/uL    Neutrophils % 68.3 40.0 - 80.0 %    Immature Granulocytes %, Automated 0.4 0.0 - 0.9 %    Lymphocytes % 23.4 13.0 - 44.0 %    Monocytes % 7.1 2.0 - 10.0 %    Eosinophils % 0.5 0.0 - 6.0 %    Basophils % 0.3 0.0 - 2.0 %    Neutrophils Absolute 7.78 (H) 1.20 - 7.70 x10*3/uL    Immature Granulocytes Absolute, Automated 0.05 0.00 - 0.70 x10*3/uL    Lymphocytes Absolute 2.66 1.20 - 4.80 x10*3/uL    Monocytes Absolute 0.81 0.10 - 1.00 x10*3/uL    Eosinophils Absolute 0.06 0.00 - 0.70 x10*3/uL    Basophils Absolute 0.03 0.00 - 0.10 x10*3/uL   B-type Natriuretic Peptide   Result Value Ref Range    BNP 44 0 - 99 pg/mL   Morphology   Result Value Ref Range    RBC Morphology See Below     Polychromasia Mild     RBC Fragments Few     Target Cells Few     Ovalocytes Few     Tim Cells Few    Comprehensive metabolic panel   Result Value Ref Range    Glucose 493 (HH) 74 - 99 mg/dL    Sodium 133 (L) 136 - 145 mmol/L    Potassium 4.7 3.5 - 5.3 mmol/L    Chloride 103 98 - 107 mmol/L    Bicarbonate 17 (L) 21 - 32 mmol/L    Anion Gap 18 10 - 20 mmol/L    Urea Nitrogen 21 6 - 23 mg/dL    Creatinine 2.43 (H) 0.50 - 1.05 mg/dL    eGFR 21 (L) >60 mL/min/1.73m*2    Calcium 6.7 (L) 8.6 - 10.3 mg/dL    Albumin 2.5 (L) 3.4 - 5.0 g/dL    Alkaline Phosphatase 78  33 - 136 U/L    Total Protein 4.8 (L) 6.4 - 8.2 g/dL     (H) 9 - 39 U/L    Bilirubin, Total 0.8 0.0 - 1.2 mg/dL     (H) 7 - 45 U/L   Magnesium   Result Value Ref Range    Magnesium 2.00 1.60 - 2.40 mg/dL   CBC and Auto Differential   Result Value Ref Range    WBC 13.7 (H) 4.4 - 11.3 x10*3/uL    nRBC 0.0 0.0 - 0.0 /100 WBCs    RBC 6.42 (H) 4.00 - 5.20 x10*6/uL    Hemoglobin 13.2 12.0 - 16.0 g/dL    Hematocrit 41.8 36.0 - 46.0 %    MCV 65 (L) 80 - 100 fL    MCH 20.6 (L) 26.0 - 34.0 pg    MCHC 31.6 (L) 32.0 - 36.0 g/dL    RDW 19.0 (H) 11.5 - 14.5 %    Platelets 81 (L) 150 - 450 x10*3/uL    Neutrophils % 75.4 40.0 - 80.0 %    Immature Granulocytes %, Automated 0.5 0.0 - 0.9 %    Lymphocytes % 17.7 13.0 - 44.0 %    Monocytes % 5.9 2.0 - 10.0 %    Eosinophils % 0.3 0.0 - 6.0 %    Basophils % 0.2 0.0 - 2.0 %    Neutrophils Absolute 10.29 (H) 1.20 - 7.70 x10*3/uL    Immature Granulocytes Absolute, Automated 0.07 0.00 - 0.70 x10*3/uL    Lymphocytes Absolute 2.42 1.20 - 4.80 x10*3/uL    Monocytes Absolute 0.81 0.10 - 1.00 x10*3/uL    Eosinophils Absolute 0.04 0.00 - 0.70 x10*3/uL    Basophils Absolute 0.03 0.00 - 0.10 x10*3/uL   BLOOD GAS VENOUS FULL PANEL   Result Value Ref Range    POCT pH, Venous 7.37 7.33 - 7.43 pH    POCT pCO2, Venous 44 41 - 51 mm Hg    POCT pO2, Venous 34 (L) 35 - 45 mm Hg    POCT SO2, Venous 52 45 - 75 %    POCT Oxy Hemoglobin, Venous 50.4 45.0 - 75.0 %    POCT Hematocrit Calculated, Venous 38.0 36.0 - 46.0 %    POCT Sodium, Venous 131 (L) 136 - 145 mmol/L    POCT Potassium, Venous 2.5 (LL) 3.5 - 5.3 mmol/L    POCT Chloride, Venous 98 98 - 107 mmol/L    POCT Ionized Calicum, Venous 1.15 1.10 - 1.33 mmol/L    POCT Glucose, Venous      POCT Lactate, Venous 3.5 (H) 0.4 - 2.0 mmol/L    POCT Base Excess, Venous -0.1 -2.0 - 3.0 mmol/L    POCT HCO3 Calculated, Venous 25.4 22.0 - 26.0 mmol/L    POCT Hemoglobin, Venous 12.7 12.0 - 16.0 g/dL    POCT Anion Gap, Venous 10.0 10.0 - 25.0 mmol/L     Patient Temperature 37.0 degrees Celsius    FiO2 21 %   Potassium   Result Value Ref Range    Potassium 2.7 (LL) 3.5 - 5.3 mmol/L   POCT GLUCOSE   Result Value Ref Range    POCT Glucose 448 (H) 74 - 99 mg/dL          Assessment/Plan   Principal Problem:    Shortness of breath  Assessment:  Urinary tract infection early sepsis  Hyperglycemia  AMARILYS on CKD  Hypokalemia  Elevated transaminases, in setting of sepsis?   Emphysema with right upper lobe cavitary density, has history of pulm nodules       Plan:  IV Rocephin  IV hydration  Consult pulmonology  Consult endocrinology  Replace electrolytes  Cmp/cbc in am       type 2 diabetes,  -lispro sliding scale   -uncontrolled dm       HTN,   HLD, peripheral vascular disease,   CAD with stent placement,   -asa, Crestor     CKD stage III  -above base line    COPD  -continue home respiratory meds     DVT prophylaxis-heparin subcutaneous       GAEL Alcantar-CNP

## 2024-03-30 LAB
ALBUMIN SERPL BCP-MCNC: 2.6 G/DL (ref 3.4–5)
ALP SERPL-CCNC: 84 U/L (ref 33–136)
ALT SERPL W P-5'-P-CCNC: 261 U/L (ref 7–45)
ANION GAP SERPL CALC-SCNC: 10 MMOL/L (ref 10–20)
AST SERPL W P-5'-P-CCNC: 214 U/L (ref 9–39)
BILIRUB SERPL-MCNC: 0.6 MG/DL (ref 0–1.2)
BUN SERPL-MCNC: 19 MG/DL (ref 6–23)
CALCIUM SERPL-MCNC: 7.8 MG/DL (ref 8.6–10.3)
CHLORIDE SERPL-SCNC: 112 MMOL/L (ref 98–107)
CO2 SERPL-SCNC: 22 MMOL/L (ref 21–32)
CORTIS SERPL-MCNC: 21 UG/DL (ref 2.5–20)
CREAT SERPL-MCNC: 2.5 MG/DL (ref 0.5–1.05)
EGFRCR SERPLBLD CKD-EPI 2021: 20 ML/MIN/1.73M*2
ERYTHROCYTE [DISTWIDTH] IN BLOOD BY AUTOMATED COUNT: 19.2 % (ref 11.5–14.5)
GLUCOSE BLD MANUAL STRIP-MCNC: 107 MG/DL (ref 74–99)
GLUCOSE BLD MANUAL STRIP-MCNC: 116 MG/DL (ref 74–99)
GLUCOSE BLD MANUAL STRIP-MCNC: 168 MG/DL (ref 74–99)
GLUCOSE BLD MANUAL STRIP-MCNC: 227 MG/DL (ref 74–99)
GLUCOSE BLD MANUAL STRIP-MCNC: 277 MG/DL (ref 74–99)
GLUCOSE BLD MANUAL STRIP-MCNC: 312 MG/DL (ref 74–99)
GLUCOSE SERPL-MCNC: 121 MG/DL (ref 74–99)
HAV IGM SER QL: NONREACTIVE
HBV CORE IGM SER QL: NONREACTIVE
HBV SURFACE AG SERPL QL IA: NONREACTIVE
HCT VFR BLD AUTO: 39.5 % (ref 36–46)
HCV AB SER QL: NONREACTIVE
HGB BLD-MCNC: 12.2 G/DL (ref 12–16)
MCH RBC QN AUTO: 20.7 PG (ref 26–34)
MCHC RBC AUTO-ENTMCNC: 30.9 G/DL (ref 32–36)
MCV RBC AUTO: 67 FL (ref 80–100)
NRBC BLD-RTO: 0 /100 WBCS (ref 0–0)
PLATELET # BLD AUTO: 96 X10*3/UL (ref 150–450)
POTASSIUM SERPL-SCNC: 3.3 MMOL/L (ref 3.5–5.3)
PROT SERPL-MCNC: 4.5 G/DL (ref 6.4–8.2)
RBC # BLD AUTO: 5.9 X10*6/UL (ref 4–5.2)
SODIUM SERPL-SCNC: 141 MMOL/L (ref 136–145)
TSH SERPL-ACNC: 3.91 MIU/L (ref 0.44–3.98)
WBC # BLD AUTO: 11.9 X10*3/UL (ref 4.4–11.3)

## 2024-03-30 PROCEDURE — 2500000004 HC RX 250 GENERAL PHARMACY W/ HCPCS (ALT 636 FOR OP/ED): Performed by: INTERNAL MEDICINE

## 2024-03-30 PROCEDURE — 2500000001 HC RX 250 WO HCPCS SELF ADMINISTERED DRUGS (ALT 637 FOR MEDICARE OP): Performed by: INTERNAL MEDICINE

## 2024-03-30 PROCEDURE — 2500000001 HC RX 250 WO HCPCS SELF ADMINISTERED DRUGS (ALT 637 FOR MEDICARE OP): Performed by: NURSE PRACTITIONER

## 2024-03-30 PROCEDURE — 84443 ASSAY THYROID STIM HORMONE: CPT | Performed by: INTERNAL MEDICINE

## 2024-03-30 PROCEDURE — 84145 PROCALCITONIN (PCT): CPT | Mod: AHULAB | Performed by: INTERNAL MEDICINE

## 2024-03-30 PROCEDURE — 82947 ASSAY GLUCOSE BLOOD QUANT: CPT

## 2024-03-30 PROCEDURE — 94640 AIRWAY INHALATION TREATMENT: CPT | Mod: MUE

## 2024-03-30 PROCEDURE — 85027 COMPLETE CBC AUTOMATED: CPT | Performed by: NURSE PRACTITIONER

## 2024-03-30 PROCEDURE — 87040 BLOOD CULTURE FOR BACTERIA: CPT | Mod: AHULAB | Performed by: INTERNAL MEDICINE

## 2024-03-30 PROCEDURE — 2500000002 HC RX 250 W HCPCS SELF ADMINISTERED DRUGS (ALT 637 FOR MEDICARE OP, ALT 636 FOR OP/ED): Performed by: INTERNAL MEDICINE

## 2024-03-30 PROCEDURE — 83036 HEMOGLOBIN GLYCOSYLATED A1C: CPT | Mod: AHULAB | Performed by: INTERNAL MEDICINE

## 2024-03-30 PROCEDURE — 99222 1ST HOSP IP/OBS MODERATE 55: CPT | Performed by: INTERNAL MEDICINE

## 2024-03-30 PROCEDURE — 80074 ACUTE HEPATITIS PANEL: CPT | Mod: AHULAB | Performed by: INTERNAL MEDICINE

## 2024-03-30 PROCEDURE — 99233 SBSQ HOSP IP/OBS HIGH 50: CPT | Performed by: INTERNAL MEDICINE

## 2024-03-30 PROCEDURE — 2500000002 HC RX 250 W HCPCS SELF ADMINISTERED DRUGS (ALT 637 FOR MEDICARE OP, ALT 636 FOR OP/ED): Mod: MUE | Performed by: NURSE PRACTITIONER

## 2024-03-30 PROCEDURE — 2500000004 HC RX 250 GENERAL PHARMACY W/ HCPCS (ALT 636 FOR OP/ED): Performed by: NURSE PRACTITIONER

## 2024-03-30 PROCEDURE — 80053 COMPREHEN METABOLIC PANEL: CPT | Performed by: NURSE PRACTITIONER

## 2024-03-30 PROCEDURE — 1100000001 HC PRIVATE ROOM DAILY

## 2024-03-30 PROCEDURE — 36415 COLL VENOUS BLD VENIPUNCTURE: CPT | Performed by: NURSE PRACTITIONER

## 2024-03-30 PROCEDURE — 36415 COLL VENOUS BLD VENIPUNCTURE: CPT | Performed by: INTERNAL MEDICINE

## 2024-03-30 PROCEDURE — 82533 TOTAL CORTISOL: CPT | Mod: AHULAB | Performed by: INTERNAL MEDICINE

## 2024-03-30 PROCEDURE — 2500000002 HC RX 250 W HCPCS SELF ADMINISTERED DRUGS (ALT 637 FOR MEDICARE OP, ALT 636 FOR OP/ED): Mod: MUE | Performed by: PHARMACIST

## 2024-03-30 RX ORDER — POLYETHYLENE GLYCOL 3350 17 G/17G
17 POWDER, FOR SOLUTION ORAL 2 TIMES DAILY
Status: COMPLETED | OUTPATIENT
Start: 2024-03-30 | End: 2024-03-31

## 2024-03-30 RX ORDER — POTASSIUM CHLORIDE 20 MEQ/1
40 TABLET, EXTENDED RELEASE ORAL ONCE
Status: COMPLETED | OUTPATIENT
Start: 2024-03-30 | End: 2024-03-30

## 2024-03-30 RX ORDER — INSULIN LISPRO 100 [IU]/ML
12 INJECTION, SOLUTION INTRAVENOUS; SUBCUTANEOUS ONCE
Status: COMPLETED | OUTPATIENT
Start: 2024-03-30 | End: 2024-03-30

## 2024-03-30 RX ORDER — AMOXICILLIN 250 MG
2 CAPSULE ORAL 2 TIMES DAILY
Status: DISPENSED | OUTPATIENT
Start: 2024-03-30 | End: 2024-04-02

## 2024-03-30 RX ORDER — GLIMEPIRIDE 2 MG/1
2 TABLET ORAL
Status: DISCONTINUED | OUTPATIENT
Start: 2024-03-30 | End: 2024-04-03

## 2024-03-30 RX ORDER — POTASSIUM CHLORIDE 1.5 G/1.58G
40 POWDER, FOR SOLUTION ORAL ONCE
Status: DISCONTINUED | OUTPATIENT
Start: 2024-03-30 | End: 2024-03-30

## 2024-03-30 RX ADMIN — FORMOTEROL FUMARATE DIHYDRATE 20 MCG: 20 SOLUTION RESPIRATORY (INHALATION) at 20:18

## 2024-03-30 RX ADMIN — INSULIN LISPRO 12 UNITS: 100 INJECTION, SOLUTION INTRAVENOUS; SUBCUTANEOUS at 00:25

## 2024-03-30 RX ADMIN — HEPARIN SODIUM 5000 UNITS: 5000 INJECTION INTRAVENOUS; SUBCUTANEOUS at 21:32

## 2024-03-30 RX ADMIN — INSULIN LISPRO 4 UNITS: 100 INJECTION, SOLUTION INTRAVENOUS; SUBCUTANEOUS at 18:15

## 2024-03-30 RX ADMIN — METOPROLOL TARTRATE 50 MG: 50 TABLET, FILM COATED ORAL at 09:00

## 2024-03-30 RX ADMIN — BUDESONIDE INHALATION 0.5 MG: 0.5 SUSPENSION RESPIRATORY (INHALATION) at 20:18

## 2024-03-30 RX ADMIN — BUDESONIDE INHALATION 0.5 MG: 0.5 SUSPENSION RESPIRATORY (INHALATION) at 08:08

## 2024-03-30 RX ADMIN — POLYETHYLENE GLYCOL 3350 17 G: 17 POWDER, FOR SOLUTION ORAL at 21:32

## 2024-03-30 RX ADMIN — SENNOSIDES AND DOCUSATE SODIUM 2 TABLET: 8.6; 5 TABLET ORAL at 12:59

## 2024-03-30 RX ADMIN — IPRATROPIUM BROMIDE AND ALBUTEROL SULFATE 3 ML: 2.5; .5 SOLUTION RESPIRATORY (INHALATION) at 20:19

## 2024-03-30 RX ADMIN — SODIUM CHLORIDE, POTASSIUM CHLORIDE, SODIUM LACTATE AND CALCIUM CHLORIDE 75 ML/HR: 600; 310; 30; 20 INJECTION, SOLUTION INTRAVENOUS at 06:15

## 2024-03-30 RX ADMIN — POLYETHYLENE GLYCOL 3350 17 G: 17 POWDER, FOR SOLUTION ORAL at 12:59

## 2024-03-30 RX ADMIN — POTASSIUM CHLORIDE 40 MEQ: 1500 TABLET, EXTENDED RELEASE ORAL at 09:37

## 2024-03-30 RX ADMIN — ROSUVASTATIN CALCIUM 10 MG: 10 TABLET, FILM COATED ORAL at 21:32

## 2024-03-30 RX ADMIN — METHYLPREDNISOLONE SODIUM SUCCINATE 30 MG: 40 INJECTION, POWDER, FOR SOLUTION INTRAMUSCULAR; INTRAVENOUS at 12:18

## 2024-03-30 RX ADMIN — INSULIN LISPRO 2 UNITS: 100 INJECTION, SOLUTION INTRAVENOUS; SUBCUTANEOUS at 16:33

## 2024-03-30 RX ADMIN — METOPROLOL TARTRATE 50 MG: 50 TABLET, FILM COATED ORAL at 21:32

## 2024-03-30 RX ADMIN — SENNOSIDES AND DOCUSATE SODIUM 2 TABLET: 8.6; 5 TABLET ORAL at 21:32

## 2024-03-30 RX ADMIN — METHYLPREDNISOLONE SODIUM SUCCINATE 30 MG: 40 INJECTION, POWDER, FOR SOLUTION INTRAMUSCULAR; INTRAVENOUS at 18:51

## 2024-03-30 RX ADMIN — GLIMEPIRIDE 2 MG: 2 TABLET ORAL at 09:38

## 2024-03-30 RX ADMIN — SODIUM CHLORIDE, POTASSIUM CHLORIDE, SODIUM LACTATE AND CALCIUM CHLORIDE 125 ML/HR: 600; 310; 30; 20 INJECTION, SOLUTION INTRAVENOUS at 17:36

## 2024-03-30 RX ADMIN — INSULIN LISPRO 6 UNITS: 100 INJECTION, SOLUTION INTRAVENOUS; SUBCUTANEOUS at 12:03

## 2024-03-30 RX ADMIN — HEPARIN SODIUM 5000 UNITS: 5000 INJECTION INTRAVENOUS; SUBCUTANEOUS at 06:05

## 2024-03-30 RX ADMIN — Medication 2000 UNITS: at 09:00

## 2024-03-30 RX ADMIN — HEPARIN SODIUM 5000 UNITS: 5000 INJECTION INTRAVENOUS; SUBCUTANEOUS at 13:07

## 2024-03-30 RX ADMIN — FORMOTEROL FUMARATE DIHYDRATE 20 MCG: 20 SOLUTION RESPIRATORY (INHALATION) at 08:08

## 2024-03-30 RX ADMIN — ASPIRIN 81 MG 81 MG: 81 TABLET ORAL at 09:00

## 2024-03-30 RX ADMIN — INSULIN LISPRO 8 UNITS: 100 INJECTION, SOLUTION INTRAVENOUS; SUBCUTANEOUS at 23:08

## 2024-03-30 RX ADMIN — CEFTRIAXONE SODIUM 1 G: 1 INJECTION, SOLUTION INTRAVENOUS at 09:34

## 2024-03-30 ASSESSMENT — COGNITIVE AND FUNCTIONAL STATUS - GENERAL
DRESSING REGULAR LOWER BODY CLOTHING: A LITTLE
MOVING TO AND FROM BED TO CHAIR: A LITTLE
TURNING FROM BACK TO SIDE WHILE IN FLAT BAD: A LITTLE
EATING MEALS: A LITTLE
MOVING FROM LYING ON BACK TO SITTING ON SIDE OF FLAT BED WITH BEDRAILS: A LITTLE
DRESSING REGULAR LOWER BODY CLOTHING: A LITTLE
MOBILITY SCORE: 15
DAILY ACTIVITIY SCORE: 18
WALKING IN HOSPITAL ROOM: A LOT
DAILY ACTIVITIY SCORE: 18
TOILETING: A LITTLE
EATING MEALS: A LITTLE
STANDING UP FROM CHAIR USING ARMS: A LOT
CLIMB 3 TO 5 STEPS WITH RAILING: A LOT
TOILETING: A LITTLE
DRESSING REGULAR UPPER BODY CLOTHING: A LITTLE
TURNING FROM BACK TO SIDE WHILE IN FLAT BAD: A LITTLE
PERSONAL GROOMING: A LITTLE
HELP NEEDED FOR BATHING: A LITTLE
WALKING IN HOSPITAL ROOM: A LOT
STANDING UP FROM CHAIR USING ARMS: A LOT
DRESSING REGULAR UPPER BODY CLOTHING: A LITTLE
MOVING TO AND FROM BED TO CHAIR: A LITTLE
HELP NEEDED FOR BATHING: A LITTLE
MOVING FROM LYING ON BACK TO SITTING ON SIDE OF FLAT BED WITH BEDRAILS: A LITTLE
PERSONAL GROOMING: A LITTLE
CLIMB 3 TO 5 STEPS WITH RAILING: A LOT
MOBILITY SCORE: 15

## 2024-03-30 ASSESSMENT — PAIN - FUNCTIONAL ASSESSMENT
PAIN_FUNCTIONAL_ASSESSMENT: 0-10

## 2024-03-30 ASSESSMENT — PAIN SCALES - GENERAL
PAINLEVEL_OUTOF10: 0 - NO PAIN

## 2024-03-30 ASSESSMENT — PAIN SCALES - WONG BAKER: WONGBAKER_NUMERICALRESPONSE: NO HURT

## 2024-03-30 NOTE — PROGRESS NOTES
"Yesy Ayala is a 68 y.o. female on day 1 of admission presenting with Shortness of breath.    Subjective   Started on solu medrol iv, no chest pain, no dyspnea,creatinine 2.5, lfts elevated       Objective     Physical Exam  Constitutional:       Appearance: Normal appearance.   HENT:      Head: Normocephalic.      Nose: Nose normal.      Mouth/Throat:      Mouth: Mucous membranes are dry.      Pharynx: Oropharynx is clear.   Cardiovascular:      Rate and Rhythm: Normal rate and regular rhythm.   Pulmonary:      Effort: Pulmonary effort is normal.   Abdominal:      General: Abdomen is flat. Bowel sounds are normal.      Palpations: Abdomen is soft.   Skin:     General: Skin is warm.      Capillary Refill: Capillary refill takes less than 2 seconds.   Neurological:      General: No focal deficit present.      Mental Status: She is alert.         Last Recorded Vitals  Blood pressure 139/77, pulse 78, temperature 36.5 °C (97.7 °F), temperature source Oral, resp. rate 18, height 1.473 m (4' 10\"), weight 56.2 kg (124 lb), SpO2 100 %.  Intake/Output last 3 Shifts:  I/O last 3 completed shifts:  In: 2050 (36.4 mL/kg) [IV Piggyback:2050]  Out: - (0 mL/kg)   Weight: 56.2 kg     Relevant Results  Results for orders placed or performed during the hospital encounter of 03/29/24 (from the past 24 hour(s))   Troponin, High Sensitivity, 1 Hour   Result Value Ref Range    Troponin I, High Sensitivity 97 (HH) 0 - 13 ng/L   CBC and Auto Differential   Result Value Ref Range    WBC 11.4 (H) 4.4 - 11.3 x10*3/uL    nRBC 0.0 0.0 - 0.0 /100 WBCs    RBC 5.38 (H) 4.00 - 5.20 x10*6/uL    Hemoglobin 11.1 (L) 12.0 - 16.0 g/dL    Hematocrit 35.7 (L) 36.0 - 46.0 %    MCV 66 (L) 80 - 100 fL    MCH 20.6 (L) 26.0 - 34.0 pg    MCHC 31.1 (L) 32.0 - 36.0 g/dL    RDW 18.5 (H) 11.5 - 14.5 %    Platelets 70 (L) 150 - 450 x10*3/uL    Neutrophils % 68.3 40.0 - 80.0 %    Immature Granulocytes %, Automated 0.4 0.0 - 0.9 %    Lymphocytes % 23.4 13.0 - 44.0 % "    Monocytes % 7.1 2.0 - 10.0 %    Eosinophils % 0.5 0.0 - 6.0 %    Basophils % 0.3 0.0 - 2.0 %    Neutrophils Absolute 7.78 (H) 1.20 - 7.70 x10*3/uL    Immature Granulocytes Absolute, Automated 0.05 0.00 - 0.70 x10*3/uL    Lymphocytes Absolute 2.66 1.20 - 4.80 x10*3/uL    Monocytes Absolute 0.81 0.10 - 1.00 x10*3/uL    Eosinophils Absolute 0.06 0.00 - 0.70 x10*3/uL    Basophils Absolute 0.03 0.00 - 0.10 x10*3/uL   B-type Natriuretic Peptide   Result Value Ref Range    BNP 44 0 - 99 pg/mL   Morphology   Result Value Ref Range    RBC Morphology See Below     Polychromasia Mild     RBC Fragments Few     Target Cells Few     Ovalocytes Few     Iva Cells Few    Comprehensive metabolic panel   Result Value Ref Range    Glucose 493 (HH) 74 - 99 mg/dL    Sodium 133 (L) 136 - 145 mmol/L    Potassium 4.7 3.5 - 5.3 mmol/L    Chloride 103 98 - 107 mmol/L    Bicarbonate 17 (L) 21 - 32 mmol/L    Anion Gap 18 10 - 20 mmol/L    Urea Nitrogen 21 6 - 23 mg/dL    Creatinine 2.43 (H) 0.50 - 1.05 mg/dL    eGFR 21 (L) >60 mL/min/1.73m*2    Calcium 6.7 (L) 8.6 - 10.3 mg/dL    Albumin 2.5 (L) 3.4 - 5.0 g/dL    Alkaline Phosphatase 78 33 - 136 U/L    Total Protein 4.8 (L) 6.4 - 8.2 g/dL     (H) 9 - 39 U/L    Bilirubin, Total 0.8 0.0 - 1.2 mg/dL     (H) 7 - 45 U/L   Magnesium   Result Value Ref Range    Magnesium 2.00 1.60 - 2.40 mg/dL   CBC and Auto Differential   Result Value Ref Range    WBC 13.7 (H) 4.4 - 11.3 x10*3/uL    nRBC 0.0 0.0 - 0.0 /100 WBCs    RBC 6.42 (H) 4.00 - 5.20 x10*6/uL    Hemoglobin 13.2 12.0 - 16.0 g/dL    Hematocrit 41.8 36.0 - 46.0 %    MCV 65 (L) 80 - 100 fL    MCH 20.6 (L) 26.0 - 34.0 pg    MCHC 31.6 (L) 32.0 - 36.0 g/dL    RDW 19.0 (H) 11.5 - 14.5 %    Platelets 81 (L) 150 - 450 x10*3/uL    Neutrophils % 75.4 40.0 - 80.0 %    Immature Granulocytes %, Automated 0.5 0.0 - 0.9 %    Lymphocytes % 17.7 13.0 - 44.0 %    Monocytes % 5.9 2.0 - 10.0 %    Eosinophils % 0.3 0.0 - 6.0 %    Basophils % 0.2 0.0  - 2.0 %    Neutrophils Absolute 10.29 (H) 1.20 - 7.70 x10*3/uL    Immature Granulocytes Absolute, Automated 0.07 0.00 - 0.70 x10*3/uL    Lymphocytes Absolute 2.42 1.20 - 4.80 x10*3/uL    Monocytes Absolute 0.81 0.10 - 1.00 x10*3/uL    Eosinophils Absolute 0.04 0.00 - 0.70 x10*3/uL    Basophils Absolute 0.03 0.00 - 0.10 x10*3/uL   BLOOD GAS VENOUS FULL PANEL   Result Value Ref Range    POCT pH, Venous 7.37 7.33 - 7.43 pH    POCT pCO2, Venous 44 41 - 51 mm Hg    POCT pO2, Venous 34 (L) 35 - 45 mm Hg    POCT SO2, Venous 52 45 - 75 %    POCT Oxy Hemoglobin, Venous 50.4 45.0 - 75.0 %    POCT Hematocrit Calculated, Venous 38.0 36.0 - 46.0 %    POCT Sodium, Venous 131 (L) 136 - 145 mmol/L    POCT Potassium, Venous 2.5 (LL) 3.5 - 5.3 mmol/L    POCT Chloride, Venous 98 98 - 107 mmol/L    POCT Ionized Calicum, Venous 1.15 1.10 - 1.33 mmol/L    POCT Glucose, Venous      POCT Lactate, Venous 3.5 (H) 0.4 - 2.0 mmol/L    POCT Base Excess, Venous -0.1 -2.0 - 3.0 mmol/L    POCT HCO3 Calculated, Venous 25.4 22.0 - 26.0 mmol/L    POCT Hemoglobin, Venous 12.7 12.0 - 16.0 g/dL    POCT Anion Gap, Venous 10.0 10.0 - 25.0 mmol/L    Patient Temperature 37.0 degrees Celsius    FiO2 21 %   Potassium   Result Value Ref Range    Potassium 2.7 (LL) 3.5 - 5.3 mmol/L   Magnesium   Result Value Ref Range    Magnesium 2.10 1.60 - 2.40 mg/dL   POCT GLUCOSE   Result Value Ref Range    POCT Glucose 448 (H) 74 - 99 mg/dL   POCT GLUCOSE   Result Value Ref Range    POCT Glucose 421 (H) 74 - 99 mg/dL   POCT GLUCOSE   Result Value Ref Range    POCT Glucose 107 (H) 74 - 99 mg/dL   CBC   Result Value Ref Range    WBC 11.9 (H) 4.4 - 11.3 x10*3/uL    nRBC 0.0 0.0 - 0.0 /100 WBCs    RBC 5.90 (H) 4.00 - 5.20 x10*6/uL    Hemoglobin 12.2 12.0 - 16.0 g/dL    Hematocrit 39.5 36.0 - 46.0 %    MCV 67 (L) 80 - 100 fL    MCH 20.7 (L) 26.0 - 34.0 pg    MCHC 30.9 (L) 32.0 - 36.0 g/dL    RDW 19.2 (H) 11.5 - 14.5 %    Platelets 96 (L) 150 - 450 x10*3/uL   Comprehensive  Metabolic Panel   Result Value Ref Range    Glucose 121 (H) 74 - 99 mg/dL    Sodium 141 136 - 145 mmol/L    Potassium 3.3 (L) 3.5 - 5.3 mmol/L    Chloride 112 (H) 98 - 107 mmol/L    Bicarbonate 22 21 - 32 mmol/L    Anion Gap 10 10 - 20 mmol/L    Urea Nitrogen 19 6 - 23 mg/dL    Creatinine 2.50 (H) 0.50 - 1.05 mg/dL    eGFR 20 (L) >60 mL/min/1.73m*2    Calcium 7.8 (L) 8.6 - 10.3 mg/dL    Albumin 2.6 (L) 3.4 - 5.0 g/dL    Alkaline Phosphatase 84 33 - 136 U/L    Total Protein 4.5 (L) 6.4 - 8.2 g/dL     (H) 9 - 39 U/L    Bilirubin, Total 0.6 0.0 - 1.2 mg/dL     (H) 7 - 45 U/L   POCT GLUCOSE   Result Value Ref Range    POCT Glucose 116 (H) 74 - 99 mg/dL       Imaging Results  Ct chest:  IMPRESSION:  1. Emphysematous changes are present. Subtle density RIGHT upper lobe  appears to be present which appears to be cavitary. Follow-up PET scan  and/or tissue sampling recommended for further assessment.  2. Scattered micronodules are stable.  3. Nonobstructing left-sided nephrolithiasis.  4. Atherosclerosis of the thoracic aorta and coronary arteries is  present.    Cxr:  IMPRESSION:  No acute cardiopulmonary abnormality.    Medications:  aspirin, 81 mg, oral, Daily  budesonide, 0.5 mg, nebulization, BID  cefTRIAXone, 1 g, intravenous, q24h  cholecalciferol, 2,000 Units, oral, Daily  influenza, 0.7 mL, intramuscular, During hospitalization  formoterol, 20 mcg, nebulization, BID  glimepiride, 2 mg, oral, Daily with breakfast  heparin (porcine), 5,000 Units, subcutaneous, q8h SONIDO  insulin lispro, 0-10 Units, subcutaneous, q4h  methylPREDNISolone sodium succinate (PF), 30 mg, intravenous, q8h  metoprolol tartrate, 50 mg, oral, BID  rosuvastatin, 10 mg, oral, Nightly       PRN medications: albuterol, dextrose, dextrose, glucagon, glucagon, ipratropium-albuteroL     Assessment/Plan   RUL cavitary lesion  - to be seen  pulmonary  -started on iv steroids    2. UTI  -iv ceftriaxone    3. Hyperglycemia with DMII  -resume  glimepiride  -on SSI    4. HLD  -rosuvastatin    5. Elevated LFTs  -ck acute hep panel    DVT Prophylaxis:  Heparin subq    Renita Weaver MD  [facility]

## 2024-03-30 NOTE — NURSING NOTE
Patient presented in room 531 from the er with sugar 448. Insulin was not given in er. Spoke with Dr. Rincon, order to give 10 units of humalog and hold the humlin and to recheck sugar at bed time.  Second bag of IV potassium was also not given in er so orders to hold and will put on a oral dose for 8p. Patient vs are stable and is resting comfortably at this time, will continue to monitor.

## 2024-03-30 NOTE — PROGRESS NOTES
"Yesy Ayala is a 68 y.o. female on day 1 of admission presenting with Shortness of breath.    Subjective   Started on solu medrol iv, no chest pain, no dyspnea,creatinine 2.5, lfts elevated       Objective     Physical Exam  Constitutional:       Appearance: Normal appearance.   HENT:      Head: Normocephalic.      Nose: Nose normal.      Mouth/Throat:      Mouth: Mucous membranes are dry.      Pharynx: Oropharynx is clear.   Cardiovascular:      Rate and Rhythm: Normal rate and regular rhythm.   Pulmonary:      Effort: Pulmonary effort is normal.   Abdominal:      General: Abdomen is flat. Bowel sounds are normal.      Palpations: Abdomen is soft.   Skin:     General: Skin is warm.      Capillary Refill: Capillary refill takes less than 2 seconds.   Neurological:      General: No focal deficit present.      Mental Status: She is alert.         Last Recorded Vitals  Blood pressure 139/77, pulse 78, temperature 36.5 °C (97.7 °F), temperature source Oral, resp. rate 18, height 1.473 m (4' 10\"), weight 56.2 kg (124 lb), SpO2 100 %.  Intake/Output last 3 Shifts:  I/O last 3 completed shifts:  In: 2050 (36.4 mL/kg) [IV Piggyback:2050]  Out: - (0 mL/kg)   Weight: 56.2 kg     Relevant Results  Results for orders placed or performed during the hospital encounter of 03/29/24 (from the past 24 hour(s))   Troponin, High Sensitivity, 1 Hour   Result Value Ref Range    Troponin I, High Sensitivity 97 (HH) 0 - 13 ng/L   CBC and Auto Differential   Result Value Ref Range    WBC 11.4 (H) 4.4 - 11.3 x10*3/uL    nRBC 0.0 0.0 - 0.0 /100 WBCs    RBC 5.38 (H) 4.00 - 5.20 x10*6/uL    Hemoglobin 11.1 (L) 12.0 - 16.0 g/dL    Hematocrit 35.7 (L) 36.0 - 46.0 %    MCV 66 (L) 80 - 100 fL    MCH 20.6 (L) 26.0 - 34.0 pg    MCHC 31.1 (L) 32.0 - 36.0 g/dL    RDW 18.5 (H) 11.5 - 14.5 %    Platelets 70 (L) 150 - 450 x10*3/uL    Neutrophils % 68.3 40.0 - 80.0 %    Immature Granulocytes %, Automated 0.4 0.0 - 0.9 %    Lymphocytes % 23.4 13.0 - 44.0 % "    Monocytes % 7.1 2.0 - 10.0 %    Eosinophils % 0.5 0.0 - 6.0 %    Basophils % 0.3 0.0 - 2.0 %    Neutrophils Absolute 7.78 (H) 1.20 - 7.70 x10*3/uL    Immature Granulocytes Absolute, Automated 0.05 0.00 - 0.70 x10*3/uL    Lymphocytes Absolute 2.66 1.20 - 4.80 x10*3/uL    Monocytes Absolute 0.81 0.10 - 1.00 x10*3/uL    Eosinophils Absolute 0.06 0.00 - 0.70 x10*3/uL    Basophils Absolute 0.03 0.00 - 0.10 x10*3/uL   B-type Natriuretic Peptide   Result Value Ref Range    BNP 44 0 - 99 pg/mL   Morphology   Result Value Ref Range    RBC Morphology See Below     Polychromasia Mild     RBC Fragments Few     Target Cells Few     Ovalocytes Few     Gunter Cells Few    Comprehensive metabolic panel   Result Value Ref Range    Glucose 493 (HH) 74 - 99 mg/dL    Sodium 133 (L) 136 - 145 mmol/L    Potassium 4.7 3.5 - 5.3 mmol/L    Chloride 103 98 - 107 mmol/L    Bicarbonate 17 (L) 21 - 32 mmol/L    Anion Gap 18 10 - 20 mmol/L    Urea Nitrogen 21 6 - 23 mg/dL    Creatinine 2.43 (H) 0.50 - 1.05 mg/dL    eGFR 21 (L) >60 mL/min/1.73m*2    Calcium 6.7 (L) 8.6 - 10.3 mg/dL    Albumin 2.5 (L) 3.4 - 5.0 g/dL    Alkaline Phosphatase 78 33 - 136 U/L    Total Protein 4.8 (L) 6.4 - 8.2 g/dL     (H) 9 - 39 U/L    Bilirubin, Total 0.8 0.0 - 1.2 mg/dL     (H) 7 - 45 U/L   Magnesium   Result Value Ref Range    Magnesium 2.00 1.60 - 2.40 mg/dL   CBC and Auto Differential   Result Value Ref Range    WBC 13.7 (H) 4.4 - 11.3 x10*3/uL    nRBC 0.0 0.0 - 0.0 /100 WBCs    RBC 6.42 (H) 4.00 - 5.20 x10*6/uL    Hemoglobin 13.2 12.0 - 16.0 g/dL    Hematocrit 41.8 36.0 - 46.0 %    MCV 65 (L) 80 - 100 fL    MCH 20.6 (L) 26.0 - 34.0 pg    MCHC 31.6 (L) 32.0 - 36.0 g/dL    RDW 19.0 (H) 11.5 - 14.5 %    Platelets 81 (L) 150 - 450 x10*3/uL    Neutrophils % 75.4 40.0 - 80.0 %    Immature Granulocytes %, Automated 0.5 0.0 - 0.9 %    Lymphocytes % 17.7 13.0 - 44.0 %    Monocytes % 5.9 2.0 - 10.0 %    Eosinophils % 0.3 0.0 - 6.0 %    Basophils % 0.2 0.0  - 2.0 %    Neutrophils Absolute 10.29 (H) 1.20 - 7.70 x10*3/uL    Immature Granulocytes Absolute, Automated 0.07 0.00 - 0.70 x10*3/uL    Lymphocytes Absolute 2.42 1.20 - 4.80 x10*3/uL    Monocytes Absolute 0.81 0.10 - 1.00 x10*3/uL    Eosinophils Absolute 0.04 0.00 - 0.70 x10*3/uL    Basophils Absolute 0.03 0.00 - 0.10 x10*3/uL   BLOOD GAS VENOUS FULL PANEL   Result Value Ref Range    POCT pH, Venous 7.37 7.33 - 7.43 pH    POCT pCO2, Venous 44 41 - 51 mm Hg    POCT pO2, Venous 34 (L) 35 - 45 mm Hg    POCT SO2, Venous 52 45 - 75 %    POCT Oxy Hemoglobin, Venous 50.4 45.0 - 75.0 %    POCT Hematocrit Calculated, Venous 38.0 36.0 - 46.0 %    POCT Sodium, Venous 131 (L) 136 - 145 mmol/L    POCT Potassium, Venous 2.5 (LL) 3.5 - 5.3 mmol/L    POCT Chloride, Venous 98 98 - 107 mmol/L    POCT Ionized Calicum, Venous 1.15 1.10 - 1.33 mmol/L    POCT Glucose, Venous      POCT Lactate, Venous 3.5 (H) 0.4 - 2.0 mmol/L    POCT Base Excess, Venous -0.1 -2.0 - 3.0 mmol/L    POCT HCO3 Calculated, Venous 25.4 22.0 - 26.0 mmol/L    POCT Hemoglobin, Venous 12.7 12.0 - 16.0 g/dL    POCT Anion Gap, Venous 10.0 10.0 - 25.0 mmol/L    Patient Temperature 37.0 degrees Celsius    FiO2 21 %   Potassium   Result Value Ref Range    Potassium 2.7 (LL) 3.5 - 5.3 mmol/L   Magnesium   Result Value Ref Range    Magnesium 2.10 1.60 - 2.40 mg/dL   POCT GLUCOSE   Result Value Ref Range    POCT Glucose 448 (H) 74 - 99 mg/dL   POCT GLUCOSE   Result Value Ref Range    POCT Glucose 421 (H) 74 - 99 mg/dL   POCT GLUCOSE   Result Value Ref Range    POCT Glucose 107 (H) 74 - 99 mg/dL   CBC   Result Value Ref Range    WBC 11.9 (H) 4.4 - 11.3 x10*3/uL    nRBC 0.0 0.0 - 0.0 /100 WBCs    RBC 5.90 (H) 4.00 - 5.20 x10*6/uL    Hemoglobin 12.2 12.0 - 16.0 g/dL    Hematocrit 39.5 36.0 - 46.0 %    MCV 67 (L) 80 - 100 fL    MCH 20.7 (L) 26.0 - 34.0 pg    MCHC 30.9 (L) 32.0 - 36.0 g/dL    RDW 19.2 (H) 11.5 - 14.5 %    Platelets 96 (L) 150 - 450 x10*3/uL   Comprehensive  Metabolic Panel   Result Value Ref Range    Glucose 121 (H) 74 - 99 mg/dL    Sodium 141 136 - 145 mmol/L    Potassium 3.3 (L) 3.5 - 5.3 mmol/L    Chloride 112 (H) 98 - 107 mmol/L    Bicarbonate 22 21 - 32 mmol/L    Anion Gap 10 10 - 20 mmol/L    Urea Nitrogen 19 6 - 23 mg/dL    Creatinine 2.50 (H) 0.50 - 1.05 mg/dL    eGFR 20 (L) >60 mL/min/1.73m*2    Calcium 7.8 (L) 8.6 - 10.3 mg/dL    Albumin 2.6 (L) 3.4 - 5.0 g/dL    Alkaline Phosphatase 84 33 - 136 U/L    Total Protein 4.5 (L) 6.4 - 8.2 g/dL     (H) 9 - 39 U/L    Bilirubin, Total 0.6 0.0 - 1.2 mg/dL     (H) 7 - 45 U/L   POCT GLUCOSE   Result Value Ref Range    POCT Glucose 116 (H) 74 - 99 mg/dL       Imaging Results  Ct chest:  IMPRESSION:  1. Emphysematous changes are present. Subtle density RIGHT upper lobe  appears to be present which appears to be cavitary. Follow-up PET scan  and/or tissue sampling recommended for further assessment.  2. Scattered micronodules are stable.  3. Nonobstructing left-sided nephrolithiasis.  4. Atherosclerosis of the thoracic aorta and coronary arteries is  present.    Cxr:  IMPRESSION:  No acute cardiopulmonary abnormality.    Medications:  aspirin, 81 mg, oral, Daily  budesonide, 0.5 mg, nebulization, BID  cefTRIAXone, 1 g, intravenous, q24h  cholecalciferol, 2,000 Units, oral, Daily  influenza, 0.7 mL, intramuscular, During hospitalization  formoterol, 20 mcg, nebulization, BID  glimepiride, 2 mg, oral, Daily with breakfast  heparin (porcine), 5,000 Units, subcutaneous, q8h SONIDO  insulin lispro, 0-10 Units, subcutaneous, q4h  methylPREDNISolone sodium succinate (PF), 30 mg, intravenous, q8h  metoprolol tartrate, 50 mg, oral, BID  rosuvastatin, 10 mg, oral, Nightly       PRN medications: albuterol, dextrose, dextrose, glucagon, glucagon, ipratropium-albuteroL     Assessment/Plan   RUL cavitary lesion  - to be seen  pulmonary  -started on iv steroids    2. UTI  -iv ceftriaxone    3. Hyperglycemia with DMII  -resume  glimepiride  -on SSI    4. HLD  -rosuvastatin    DVT Prophylaxis:  Heparin subq    Renita Weaver MD  [facility]

## 2024-03-30 NOTE — CARE PLAN
The patient's goals for the shift include  patient safety throughout shift    The clinical goals for the shift include      Over the shift, the patient did not make progress toward the following goals. Barriers to progression include patient's forgetting to call for assistance. Recommendations to address these barriers include hourly rounding to ensure patient's needs are met.

## 2024-03-30 NOTE — CONSULTS
"Inpatient consult to Cardiology  Consult performed by: GAEL Salgado-CNP  Consult ordered by: Renita Weaver MD  Reason for consult: elevated troponin      History Of Present Illness:    Yesy Ayala is a 68 y.o. female with past medical history of coronary artery disease status post inferior posterior ST elevation MI with subsequent PCI to RCA at [facility] in , preserved left ventricular systolic function, hypertension, hyperlipidemia, type II non-insulin-requiring diabetes, and peripheral arterial disease status post aortobifemoral bypass, CKD, COPD, thrombocytopenia who [place] to [facility] [place]24 with shortness of breath, weakness, and decreased appetite for 1 week. Cardiology consulted for elevated troponin.    States he mother  one or two weeks ago and she has not been feeling well ever since.  Her appetitie has been poor, and she has been very tired, sleeping more than usual.  Denies any chest pain or angina.  She is short of breath with any exertion, but does admit to \"smoking like a chimney\" recently.  Denies any swelling in arms or legs.  No fever, chills, nausea, or vomiting. She has also been drinking lots of soda pop, and not eating much.  States her cousins encouraged her to finally go to the hospital.  Denies any dysuria, but has been frequently urinating. No issues with blood movements, no issues with bleeding.  She did fall last night in her room and hit her left knee, she did not hit her head and no loss of consciousness.     Follows with Dr. JOSE MARIA Vasquez for cardiology, last visit 2023 with Ammy Giang CNP    Past Cardiology Tests (Last 3 Years):  EKG:  Results for orders placed during the hospital encounter of 24    ECG 12 lead    Narrative  Sinus rhythm with occasional Premature ventricular complexes  Inferior infarct , age undetermined  Anterolateral infarct (cited on or before 2024)  Abnormal ECG  When compared with ECG of 2024 14:49,  Fusion complexes " are no longer Present  QRS voltage has increased  Inferior infarct is now Present  Questionable change in initial forces of Lateral leads  QT has lengthened  See ED provider note for full interpretation and clinical correlation  Confirmed by Aminata Farmer ([id number]) on 3/29/2024 11:23:21 AM      Results for orders placed during the hospital encounter of 01/15/24    Electrocardiogram, 12-lead PRN ACS symptoms    Narrative  Sinus rhythm with occasional Premature ventricular complexes and Fusion complexes  Anterior infarct (cited on or before 26-JAN-2024)  Abnormal ECG  When compared with ECG of 15-JOSE G-2024 15:00,  Previous ECG has undetermined rhythm, needs review  Nonspecific T wave abnormality now evident in Inferior leads  Nonspecific T wave abnormality, improved in Lateral leads  See ED provider note for full interpretation and clinical correlation  Confirmed by Rae Sommers ([id number]) on 1/29/2024 11:18:02 AM      ECG 12 lead    Narrative  See ED provider note for full interpretation and clinical correlation  Confirmed by Aminata Farmer ([id number]) on 1/16/2024 4:31:32 PM    Echo:    1/25/2024  CONCLUSIONS:   1. Left ventricular systolic function is normal with a 65-70% estimated ejection fraction.   2. Spectral Doppler shows an impaired relaxation pattern of left ventricular diastolic filling.   3. Mild aortic valve stenosis.   4. Mild aortic valve regurgitation.    Ejection Fractions:  LV biplane EF   Date/Time Value Ref Range Status   01/27/2024 02:00 PM 70 %      Cath:   Cardiac catheterization January 2014 and [facility]      Left main: Normal      LAD: Mild luminal irregularities      Left circumflex proximal 20% stenosis, in the mid circumflex spanning the origins of the      third and fourth marginal branch there is a 95% stenosis in a small caliber portion      of the vessel      RCA: Total proximal occlusion at the angina previously placed RCA stent. A 3 mm x 38      mm Promus premier stent was placed in  the proximal RCA it was post dilated with a      3.25 x 20 mm Quantum at 19 atmospheres.      Ejection fraction was 60% by left ventriculography      Stress Test:  No results found for this or any previous visit.    Cardiac Imaging:  No results found for this or any previous visit.      Past Medical History:  She has a past medical history of Body mass index (BMI) 27.0-27.9, adult (01/18/2020), Other conditions influencing health status, Personal history of other diseases of the circulatory system, Personal history of other diseases of the circulatory system, Personal history of other diseases of the circulatory system, and Personal history of other endocrine, nutritional and metabolic disease.    Past Surgical History:  She has a past surgical history that includes Other surgical history (08/22/2013); Other surgical history (08/22/2013); and Other surgical history (02/20/2014).      Social History:  She reports that she has been smoking cigarettes. She has a 20.00 pack-year smoking history. She has never been exposed to tobacco smoke. She has never used smokeless tobacco. She reports that she does not currently use alcohol. She reports that she does not currently use drugs after having used the following drugs: Marijuana.    Family History:  Family History   Problem Relation Name Age of Onset    COPD Mother      No Known Problems Father      No Known Problems Sister      No Known Problems Brother          Allergies:  Penicillin, Metformin, and Sulfa (sulfonamide antibiotics)    ROS:  10 point review of systems including (Constitutional, Eyes, ENMT, Respiratory, Cardiac, Gastrointestinal, Neurological, Psychiatric, and Hematologic) was performed and is otherwise negative.    Objective Data:  Last Recorded Vitals:  Vitals:    03/30/24 0429 03/30/24 0808 03/30/24 0820 03/30/24 1152   BP: 105/67  139/77 94/52   BP Location:   Left arm Left arm   Patient Position: Lying  Lying Lying   Pulse: 72  78 72   Resp:   18 18  "  Temp: 37.1 °C (98.8 °F)  36.5 °C (97.7 °F) 36.6 °C (97.8 °F)   TempSrc:   Oral Oral   SpO2: 96% 97% 100% 97%   Weight:       Height:         Medical Gas Therapy: None (Room air)  Weight  Av.2 kg (124 lb)  Min: 56.2 kg (124 lb)  Max: 56.2 kg (124 lb)    LABS:  CMP:  Results from last 7 days   Lab Units 24  0704 24  1508 24  1111   SODIUM mmol/L 141  --  133*   POTASSIUM mmol/L 3.3* 2.7* 4.7   CHLORIDE mmol/L 112*  --  103   CO2 mmol/L 22  --  17*   ANION GAP mmol/L 10  --  18   BUN mg/dL 19  --  21   CREATININE mg/dL 2.50*  --  2.43*   EGFR mL/min/1.73m*2 20*  --  21*   MAGNESIUM mg/dL  --  2.10 2.00   ALBUMIN g/dL 2.6*  --  2.5*   ALT U/L 261*  --  255*   AST U/L 214*  --  192*   BILIRUBIN TOTAL mg/dL 0.6  --  0.8     CBC:  Results from last 7 days   Lab Units 24  0704 24  1221 24  1111   WBC AUTO x10*3/uL 11.9* 13.7* 11.4*   HEMOGLOBIN g/dL 12.2 13.2 11.1*   HEMATOCRIT % 39.5 41.8 35.7*   PLATELETS AUTO x10*3/uL 96* 81* 70*   MCV fL 67* 65* 66*     COAG:     ABO: No results found for: \"ABO\"  HEME/ENDO:     CARDIAC:   Results from last 7 days   Lab Units 24  1111 24  1026   TROPHS ng/L 97* 114*   BNP pg/mL 44  --              Last I/O:    Intake/Output Summary (Last 24 hours) at 3/30/2024 1226  Last data filed at 3/29/2024 1524  Gross per 24 hour   Intake 1000 ml   Output --   Net 1000 ml     Net IO Since Admission: 2,050 mL [24 1226]      Imaging Results:  CT chest wo IV contrast    Result Date: 3/29/2024      1. Emphysematous changes are present. Subtle density RIGHT upper lobe appears to be present which appears to be cavitary. Follow-up PET scan and/or tissue sampling recommended for further assessment. 2. Scattered micronodules are stable. 3. Nonobstructing left-sided nephrolithiasis. 4. Atherosclerosis of the thoracic aorta and coronary arteries is present. Signed by Hoang Oneill II, MD    ECG 12 lead    Result Date: 3/29/2024  Sinus rhythm with " occasional Premature ventricular complexes Inferior infarct , age undetermined Anterolateral infarct (cited on or before 26-JAN-2024) Abnormal ECG When compared with ECG of 26-JAN-2024 14:49, Fusion complexes are no longer Present QRS voltage has increased Inferior infarct is now Present Questionable change in initial forces of Lateral leads QT has lengthened See ED provider note for full interpretation and clinical correlation Confirmed by Aminata Farmer ([id number]) on 3/29/2024 11:23:21 AM    XR chest 2 views    Result Date: 3/29/2024  STUDY: Chest Radiographs;  3/29/24 at 10:02 AM INDICATION: Weakness. COMPARISON: Chest XR 1/26/24. ACCESSION NUMBER(S): [id number] ORDERING CLINICIAN: VIRGINIA ARANA TECHNIQUE:  Frontal and lateral chest. FINDINGS: CARDIOMEDIASTINAL SILHOUETTE: Cardiomediastinal silhouette is normal in size and configuration.  LUNGS: Lungs are clear.  ABDOMEN: No remarkable upper abdominal findings.  BONES: No acute osseous changes.    No acute cardiopulmonary abnormality. Signed by Chas Puente MD      Inpatient Medications:  Scheduled medications   Medication Dose Route Frequency    aspirin  81 mg oral Daily    budesonide  0.5 mg nebulization BID    cefTRIAXone  1 g intravenous q24h    cholecalciferol  2,000 Units oral Daily    influenza  0.7 mL intramuscular During hospitalization    formoterol  20 mcg nebulization BID    glimepiride  2 mg oral Daily with breakfast    heparin (porcine)  5,000 Units subcutaneous q8h SONIDO    insulin lispro  0-10 Units subcutaneous q4h    methylPREDNISolone sodium succinate (PF)  30 mg intravenous q8h    metoprolol tartrate  50 mg oral BID    rosuvastatin  10 mg oral Nightly     PRN medications   Medication    albuterol    dextrose    dextrose    glucagon    glucagon    ipratropium-albuteroL     Continuous Medications   Medication Dose Last Rate    lactated Ringer's  125 mL/hr 125 mL/hr (03/30/24 1000)       Outpatient Medications:  Current Outpatient Medications    Medication Instructions    albuterol 90 mcg/actuation inhaler 1 puff, inhalation, Every 6 hours PRN    amLODIPine (NORVASC) 10 mg, oral, Daily    aspirin 81 mg, oral, Daily    blood-glucose meter misc Use daily or as directed for monitoring of diabetes.    cholecalciferol (VITAMIN D-3) 50 mcg, oral, Daily    fluticasone-umeclidin-vilanter (Trelegy Ellipta) 200-62.5-25 mcg blister with device 1 puff, inhalation, Daily before breakfast    glimepiride (AMARYL) 2 mg, oral, Daily before breakfast    ipratropium-albuteroL (Duo-Neb) 0.5-2.5 mg/3 mL nebulizer solution 3 mL, nebulization, 4 times daily PRN    metoprolol tartrate (LOPRESSOR) 50 mg, oral, 2 times daily    rosuvastatin (CRESTOR) 40 mg, oral, Nightly       Physical Exam:    General:  Patient is awake, alert, and oriented.  Patient is in no acute distress.  HEENT:  Normocephalic.  Moist mucosa.    Neck:  Normal Jugular Venous Pressure.  Cardiovascular:  Regular rate and rhythm.  Normal S1 and S2, no murmurs, rubs or gallops  Pulmonary:  Bilaterally diminished, bilateral expiratory wheeze  Abdomen:  Soft. Non-tender.   Non-distended.  Positive bowel sounds.  Lower Extremities:  2+ pedal pulses. No LE edema.  Neurologic:  Alert and oriented x3. No focal deficit.   Skin: Skin warm and dry, normal skin turgor.   Psychiatric: Normal affect.    Assessment/Plan     Yesy Ayala is a 68 y.o. female with past medical history of coronary artery disease status post inferior posterior ST elevation MI with subsequent PCI to RCA at [facility] in 2014, preserved left ventricular systolic function, hypertension, hyperlipidemia, type II non-insulin-requiring diabetes, and peripheral arterial disease status post aortobifemoral bypass, CKD, COPD, thrombocytopenia who [place] to [facility] [place] on [place]29/[place]24 with shortness of breath, weakness, and decreased appetite for 1 week. Cardiology consulted for elevated troponin.    In the emergency room she was found to have a temperature of 37.2,  pulse 103, respiratory rate 20, blood pressure 106/75 and oxygen saturation of 99% on an unknown FiO2. She was found to have an elevated white blood count to 11, with a left shift, H/H 11/25, platelet count 70., Creatinine elevated at 2.43 (baseline closer to 1.6). , elevated troponin 114, 97., normal BNP at 44, and an admission chest x-ray showing no infiltrates or effusions. A CT scan of the chest showed COPD changes,     Assessment:  # COPD exacerbation  # CAD s/p PCI to RCA 2014  # Hyperlipidemia  # PAD  # DM2  # Tobacco abuse  # Elevated ALT / AST  # UTI  # Elevated troponin.  Low level elevation with a flat trend consistent with a non-MI troponin elevation / chronic non-traumatic myocardial injury in the setting of above. Core measures do not apply.    Plan:  - Continue ASA,  Continue to hold statin with elevated ALT / AST  - Pulmonary following, steroids started  - Abx per primary team  - Limited echo on Monday ( recent big stressor with loss of her mother)     Code Status:  Full Code    Glenna Machuca, APRN-CNP

## 2024-03-30 NOTE — CONSULTS
"Consult Note    Patient is a 68 y.o. female with a past medical history of diabetes, hypertension, peripheral vascular disease, coronary artery disease-status post stent, chronic renal failure, COPD, RSV, small bilateral pulmonary nodules, claudication, aorto-bifemoral bypass and thrombocytopenia, who was admitted to [facility] on March 29, 2024 with dyspnea on exertion, weakness, sleepiness and decreased appetite for 1 week prior to admission.  In the emergency room she was found to have a temperature of 37.2, pulse 103, respiratory rate 20, blood pressure 106/75 and oxygen saturation of 99% on an unknown FiO2.  She was found to have an elevated white blood count with a left shift, low hematocrit and platelet count, low sodium and bicarbonate level, elevated creatinine, AST and ALT, elevated troponin, normal BNP, venous blood gas showing a pH of 7.37, pCO2 44 and a bicarbonate of 25, negative coronavirus PCR, influenza A/B and RSV and an admission chest x-ray showing no infiltrates or effusions.  A CT scan of the chest showed COPD changes, mild subpleural fibrotic changes, a stable 3 mm right upper lobe pulmonary nodule, no adenopathy, a left kidney stone and a new small nodular opacity with an apparent cavity (from January 20, 2024).  The patient was treated with DuoNeb, Pulmicort, Perforomist, Spiriva, ceftriaxone, potassium chloride, subcutaneous heparin and IV fluids.  Presently, the patient feels \"tired, weak\".  She denies fevers, chills, sweats, chest or leg pain, cough, wheezing or postnasal drip, but does admit to mild shortness of breath at rest, dyspnea on exertion and to an unknown amount of weight loss due to \"not eating\".  She uses Trelegy, but is not on oxygen or on CPAP, at home.    Past medical history: As above.    Allergies: Per EMR.    Medications: Per EMR.    Social history:  Cigarettes-smoked a third of a pack a day.  Alcohol-none.  Pets, hobbies and travel " history-noncontributory.  Occupation-retired  .    Family history:  Pulmonary disease-mother (?type).  Coronary disease-grandmother.  Hypertension-none.  Diabetes-none.  Cancer-none.    Review of systems:  Swallowing problems-denies.  GI-constipation.  -none.  Musculoskeletal-none.  Skin-no rashes.    Visit Vitals  /77 (BP Location: Left arm, Patient Position: Lying)   Pulse 78   Temp 36.5 °C (97.7 °F) (Oral)   Resp 18      Physical Exam:  Gen:  Awake, alert and in NAD; on room air.  HEENT:  Normal conjunctivae and pharynx; no sinus tenderness.  Neck:  No thyroid enlargement or adenopathy.  Pulm: Bilateral wheezes, left greater than right, without rales or rhonchi.  Heart:  RRR without S3, S4 or murmurs.  Abd:  Soft, non-tender and with positive bowel sounds.  Extrem:  No clubbing or edema.  Skin:  No rashes.    Scheduled medications  aspirin, 81 mg, oral, Daily  budesonide, 0.5 mg, nebulization, BID  cefTRIAXone, 1 g, intravenous, q24h  cholecalciferol, 2,000 Units, oral, Daily  influenza, 0.7 mL, intramuscular, During hospitalization  formoterol, 20 mcg, nebulization, BID  glimepiride, 2 mg, oral, Daily with breakfast  heparin (porcine), 5,000 Units, subcutaneous, q8h SONIDO  insulin lispro, 0-10 Units, subcutaneous, q4h  metoprolol tartrate, 50 mg, oral, BID  rosuvastatin, 10 mg, oral, Nightly  tiotropium, 2 puff, inhalation, Daily      Continuous medications  lactated Ringer's, 125 mL/hr, Last Rate: 125 mL/hr (03/30/24 1000)      PRN medications  PRN medications: albuterol, dextrose, dextrose, glucagon, glucagon, ipratropium-albuteroL     Results for orders placed or performed during the hospital encounter of 03/29/24 (from the past 96 hour(s))   Sars-CoV-2 PCR   Result Value Ref Range    Coronavirus 2019, PCR Not Detected Not Detected   Influenza A, and B PCR   Result Value Ref Range    Flu A Result Not Detected Not Detected    Flu B Result Not Detected Not Detected   ECG 12 lead   Result  Value Ref Range    Ventricular Rate 100 BPM    Atrial Rate 100 BPM    HI Interval 128 ms    QRS Duration 88 ms    QT Interval 360 ms    QTC Calculation(Bazett) 464 ms    P Axis 36 degrees    R Axis -6 degrees    T Axis 32 degrees    QRS Count 16 beats    Q Onset 218 ms    P Onset 154 ms    P Offset 197 ms    T Offset 398 ms    QTC Fredericia 427 ms   RSV PCR   Result Value Ref Range    RSV PCR Not Detected Not Detected   Comprehensive metabolic panel   Result Value Ref Range    Glucose      Sodium      Potassium      Chloride      Bicarbonate      Anion Gap      Urea Nitrogen      Creatinine      eGFR      Calcium      Albumin      Alkaline Phosphatase      Total Protein      AST      Bilirubin, Total      ALT     Magnesium   Result Value Ref Range    Magnesium     Urinalysis with Reflex Microscopic   Result Value Ref Range    Color, Urine Colorless (N) Light-Yellow, Yellow, Dark-Yellow    Appearance, Urine Turbid (N) Clear    Specific Gravity, Urine 1.017 1.005 - 1.035    pH, Urine 6.0 5.0, 5.5, 6.0, 6.5, 7.0, 7.5, 8.0    Protein, Urine 100 (2+) (A) NEGATIVE, 10 (TRACE), 20 (TRACE) mg/dL    Glucose, Urine OVER (4+) (A) Normal mg/dL    Blood, Urine OVER (3+) (A) NEGATIVE    Ketones, Urine NEGATIVE NEGATIVE mg/dL    Bilirubin, Urine NEGATIVE NEGATIVE    Urobilinogen, Urine Normal Normal mg/dL    Nitrite, Urine NEGATIVE NEGATIVE    Leukocyte Esterase, Urine 500 Petros/µL (A) NEGATIVE   Troponin I, High Sensitivity, Initial   Result Value Ref Range    Troponin I, High Sensitivity 114 (HH) 0 - 13 ng/L   Microscopic Only, Urine   Result Value Ref Range    WBC, Urine >50 (A) 1-5, NONE /HPF    RBC, Urine >20 (A) NONE, 1-2, 3-5 /HPF    Squamous Epithelial Cells, Urine 1-9 (SPARSE) Reference range not established. /HPF    Transitional Epithelial Cells, Urine 1-2 (FEW) Reference range not established. /HPF    Bacteria, Urine 2+ (A) NONE SEEN /HPF    Mucus, Urine FEW Reference range not established. /LPF   Troponin, High  Sensitivity, 1 Hour   Result Value Ref Range    Troponin I, High Sensitivity 97 (HH) 0 - 13 ng/L   CBC and Auto Differential   Result Value Ref Range    WBC 11.4 (H) 4.4 - 11.3 x10*3/uL    nRBC 0.0 0.0 - 0.0 /100 WBCs    RBC 5.38 (H) 4.00 - 5.20 x10*6/uL    Hemoglobin 11.1 (L) 12.0 - 16.0 g/dL    Hematocrit 35.7 (L) 36.0 - 46.0 %    MCV 66 (L) 80 - 100 fL    MCH 20.6 (L) 26.0 - 34.0 pg    MCHC 31.1 (L) 32.0 - 36.0 g/dL    RDW 18.5 (H) 11.5 - 14.5 %    Platelets 70 (L) 150 - 450 x10*3/uL    Neutrophils % 68.3 40.0 - 80.0 %    Immature Granulocytes %, Automated 0.4 0.0 - 0.9 %    Lymphocytes % 23.4 13.0 - 44.0 %    Monocytes % 7.1 2.0 - 10.0 %    Eosinophils % 0.5 0.0 - 6.0 %    Basophils % 0.3 0.0 - 2.0 %    Neutrophils Absolute 7.78 (H) 1.20 - 7.70 x10*3/uL    Immature Granulocytes Absolute, Automated 0.05 0.00 - 0.70 x10*3/uL    Lymphocytes Absolute 2.66 1.20 - 4.80 x10*3/uL    Monocytes Absolute 0.81 0.10 - 1.00 x10*3/uL    Eosinophils Absolute 0.06 0.00 - 0.70 x10*3/uL    Basophils Absolute 0.03 0.00 - 0.10 x10*3/uL   B-type Natriuretic Peptide   Result Value Ref Range    BNP 44 0 - 99 pg/mL   Morphology   Result Value Ref Range    RBC Morphology See Below     Polychromasia Mild     RBC Fragments Few     Target Cells Few     Ovalocytes Few     Tim Cells Few    Comprehensive metabolic panel   Result Value Ref Range    Glucose 493 (HH) 74 - 99 mg/dL    Sodium 133 (L) 136 - 145 mmol/L    Potassium 4.7 3.5 - 5.3 mmol/L    Chloride 103 98 - 107 mmol/L    Bicarbonate 17 (L) 21 - 32 mmol/L    Anion Gap 18 10 - 20 mmol/L    Urea Nitrogen 21 6 - 23 mg/dL    Creatinine 2.43 (H) 0.50 - 1.05 mg/dL    eGFR 21 (L) >60 mL/min/1.73m*2    Calcium 6.7 (L) 8.6 - 10.3 mg/dL    Albumin 2.5 (L) 3.4 - 5.0 g/dL    Alkaline Phosphatase 78 33 - 136 U/L    Total Protein 4.8 (L) 6.4 - 8.2 g/dL     (H) 9 - 39 U/L    Bilirubin, Total 0.8 0.0 - 1.2 mg/dL     (H) 7 - 45 U/L   Magnesium   Result Value Ref Range    Magnesium 2.00  1.60 - 2.40 mg/dL   CBC and Auto Differential   Result Value Ref Range    WBC 13.7 (H) 4.4 - 11.3 x10*3/uL    nRBC 0.0 0.0 - 0.0 /100 WBCs    RBC 6.42 (H) 4.00 - 5.20 x10*6/uL    Hemoglobin 13.2 12.0 - 16.0 g/dL    Hematocrit 41.8 36.0 - 46.0 %    MCV 65 (L) 80 - 100 fL    MCH 20.6 (L) 26.0 - 34.0 pg    MCHC 31.6 (L) 32.0 - 36.0 g/dL    RDW 19.0 (H) 11.5 - 14.5 %    Platelets 81 (L) 150 - 450 x10*3/uL    Neutrophils % 75.4 40.0 - 80.0 %    Immature Granulocytes %, Automated 0.5 0.0 - 0.9 %    Lymphocytes % 17.7 13.0 - 44.0 %    Monocytes % 5.9 2.0 - 10.0 %    Eosinophils % 0.3 0.0 - 6.0 %    Basophils % 0.2 0.0 - 2.0 %    Neutrophils Absolute 10.29 (H) 1.20 - 7.70 x10*3/uL    Immature Granulocytes Absolute, Automated 0.07 0.00 - 0.70 x10*3/uL    Lymphocytes Absolute 2.42 1.20 - 4.80 x10*3/uL    Monocytes Absolute 0.81 0.10 - 1.00 x10*3/uL    Eosinophils Absolute 0.04 0.00 - 0.70 x10*3/uL    Basophils Absolute 0.03 0.00 - 0.10 x10*3/uL   BLOOD GAS VENOUS FULL PANEL   Result Value Ref Range    POCT pH, Venous 7.37 7.33 - 7.43 pH    POCT pCO2, Venous 44 41 - 51 mm Hg    POCT pO2, Venous 34 (L) 35 - 45 mm Hg    POCT SO2, Venous 52 45 - 75 %    POCT Oxy Hemoglobin, Venous 50.4 45.0 - 75.0 %    POCT Hematocrit Calculated, Venous 38.0 36.0 - 46.0 %    POCT Sodium, Venous 131 (L) 136 - 145 mmol/L    POCT Potassium, Venous 2.5 (LL) 3.5 - 5.3 mmol/L    POCT Chloride, Venous 98 98 - 107 mmol/L    POCT Ionized Calicum, Venous 1.15 1.10 - 1.33 mmol/L    POCT Glucose, Venous      POCT Lactate, Venous 3.5 (H) 0.4 - 2.0 mmol/L    POCT Base Excess, Venous -0.1 -2.0 - 3.0 mmol/L    POCT HCO3 Calculated, Venous 25.4 22.0 - 26.0 mmol/L    POCT Hemoglobin, Venous 12.7 12.0 - 16.0 g/dL    POCT Anion Gap, Venous 10.0 10.0 - 25.0 mmol/L    Patient Temperature 37.0 degrees Celsius    FiO2 21 %   Potassium   Result Value Ref Range    Potassium 2.7 (LL) 3.5 - 5.3 mmol/L   Magnesium   Result Value Ref Range    Magnesium 2.10 1.60 - 2.40 mg/dL    POCT GLUCOSE   Result Value Ref Range    POCT Glucose 448 (H) 74 - 99 mg/dL   POCT GLUCOSE   Result Value Ref Range    POCT Glucose 421 (H) 74 - 99 mg/dL   POCT GLUCOSE   Result Value Ref Range    POCT Glucose 107 (H) 74 - 99 mg/dL   CBC   Result Value Ref Range    WBC 11.9 (H) 4.4 - 11.3 x10*3/uL    nRBC 0.0 0.0 - 0.0 /100 WBCs    RBC 5.90 (H) 4.00 - 5.20 x10*6/uL    Hemoglobin 12.2 12.0 - 16.0 g/dL    Hematocrit 39.5 36.0 - 46.0 %    MCV 67 (L) 80 - 100 fL    MCH 20.7 (L) 26.0 - 34.0 pg    MCHC 30.9 (L) 32.0 - 36.0 g/dL    RDW 19.2 (H) 11.5 - 14.5 %    Platelets 96 (L) 150 - 450 x10*3/uL   Comprehensive Metabolic Panel   Result Value Ref Range    Glucose 121 (H) 74 - 99 mg/dL    Sodium 141 136 - 145 mmol/L    Potassium 3.3 (L) 3.5 - 5.3 mmol/L    Chloride 112 (H) 98 - 107 mmol/L    Bicarbonate 22 21 - 32 mmol/L    Anion Gap 10 10 - 20 mmol/L    Urea Nitrogen 19 6 - 23 mg/dL    Creatinine 2.50 (H) 0.50 - 1.05 mg/dL    eGFR 20 (L) >60 mL/min/1.73m*2    Calcium 7.8 (L) 8.6 - 10.3 mg/dL    Albumin 2.6 (L) 3.4 - 5.0 g/dL    Alkaline Phosphatase 84 33 - 136 U/L    Total Protein 4.5 (L) 6.4 - 8.2 g/dL     (H) 9 - 39 U/L    Bilirubin, Total 0.6 0.0 - 1.2 mg/dL     (H) 7 - 45 U/L   POCT GLUCOSE   Result Value Ref Range    POCT Glucose 116 (H) 74 - 99 mg/dL        CT chest wo IV contrast    Result Date: 3/29/2024  STUDY: CT Chest without IV Contrast; 3/29/2024 1:14 PM. INDICATION: Shortness of breath.  Elevated troponin. COMPARISON: Chest radiographs performed the same date.  CT chest 1/20/2024. ACCESSION NUMBER(S): [id number] ORDERING CLINICIAN: NICKOLAS RODRIGUEZ TECHNIQUE:  CT of the chest was performed without contrast.  Automated mA/kV exposure control was utilized and patient examination was performed in strict accordance with principles of ALARA. FINDINGS: MEDIASTINUM: The heart is normal in size without pericardial effusion. Atherosclerosis of the thoracic aorta and coronary arteries is present.  LUNGS/PLEURA: Emphysematous changes are present.  Mild subpleural fibrotic changes are present.  Subtle density RIGHT upper lobe image 24 series 303 appears to be present.  This appears to be cavitary and measures approximate centimeter in size.  This lesion is not confidently identified on prior exam.  Close attention on follow-up and/or PET scan recommended for further assessment.  Semisolid nodule posterior aspect RIGHT upper lobe is stable measuring 3 mm maximally.  Scattered additional small micronodules also stable.  There is no pleural effusion or pneumothorax.  The airways are patent. LYMPH NODES: Thoracic lymph nodes are not enlarged. UPPER ABDOMEN: Nonobstructing LEFT renal stone is present measuring 5 mm maximum. BONES: There are no acute fractures.  No suspicious bony lesions. Senescent changes are present within the lumbar spine.    1. Emphysematous changes are present. Subtle density RIGHT upper lobe appears to be present which appears to be cavitary. Follow-up PET scan and/or tissue sampling recommended for further assessment. 2. Scattered micronodules are stable. 3. Nonobstructing left-sided nephrolithiasis. 4. Atherosclerosis of the thoracic aorta and coronary arteries is present. Signed by Hoang Oneill II, MD    ECG 12 lead    Result Date: 3/29/2024  Sinus rhythm with occasional Premature ventricular complexes Inferior infarct , age undetermined Anterolateral infarct (cited on or before 26-JAN-2024) Abnormal ECG When compared with ECG of 26-JAN-2024 14:49, Fusion complexes are no longer Present QRS voltage has increased Inferior infarct is now Present Questionable change in initial forces of Lateral leads QT has lengthened See ED provider note for full interpretation and clinical correlation Confirmed by Aminata Farmer ([id number]) on 3/29/2024 11:23:21 AM    XR chest 2 views    Result Date: 3/29/2024  STUDY: Chest Radiographs;  3/29/24 at 10:02 AM INDICATION: Weakness. COMPARISON: Chest XR  1/26/24. ACCESSION NUMBER(S): [id number] ORDERING CLINICIAN: VIRGINIA ARANA TECHNIQUE:  Frontal and lateral chest. FINDINGS: CARDIOMEDIASTINAL SILHOUETTE: Cardiomediastinal silhouette is normal in size and configuration.  LUNGS: Lungs are clear.  ABDOMEN: No remarkable upper abdominal findings.  BONES: No acute osseous changes.    No acute cardiopulmonary abnormality. Signed by Chas Puente MD     Assessment:  68-year-old woman with a history of RSV, diabetes, hypertension, peripheral vascular disease, coronary disease-status post stent, chronic renal failure, COPD, small bilateral pulmonary nodules, claudication, aorto-bifemoral bypass and thrombocytopenia, admitted with dyspnea on exertion, weakness, sleepiness and decreased appetite, and found to have an elevated white blood count with a left shift, normal BNP and an incidental finding of a small right upper lobe cavitary pulmonary nodule, of uncertain etiology.  The differential diagnosis includes:  Early malignancy; artifact from a dilated bronchus seen in cross-section; partially occluded small airway due to mucous plugging; or atypical infection.  The patient has moderate bronchospasm consistent with a COPD exacerbation.  The cause of the patient's weakness and fatigue is uncertain.  Hypothyroidism and/or adrenal insufficiency cannot be excluded.    Recommend:  1.  Continue DuoNeb, Pulmicort, formoterol, ceftriaxone and subcutaneous heparin, but discontinue Spiriva; begin Solu-Medrol 30 mg IV every 8 hours..  2.  Incentive spirometry and Acapella treatment.  3.  Keep oxygen saturation 92 to 95%.  4.  Repeat CT scan of the chest in approximately 3 months and/or consider a PET scan as an outpatient for follow-up of the patient's right upper lobe cavitary nodule..  5.  Check procalcitonin level, thyroid function tests and random cortisol.  6.  Follow-up with her outpatient pulmonologist, Dr. Curt Gurrola, after discharge.    Arnulfo Oropeza MD

## 2024-03-30 NOTE — NURSING NOTE
During assessment this am noticed an abrasion to patient's left knee.   Patient advised that she feel last night when attempting to go to the bathroom. Denies hitting her head -- only her knee.   Knee cleansed with normal saline and site dressed.   Dr. Weaver advised of patient's fall.  No additional interventions necessary per physician.    Rachael Stanley

## 2024-03-31 LAB
ALBUMIN SERPL BCP-MCNC: 2.5 G/DL (ref 3.4–5)
ALP SERPL-CCNC: 81 U/L (ref 33–136)
ALT SERPL W P-5'-P-CCNC: 237 U/L (ref 7–45)
ANION GAP SERPL CALC-SCNC: 11 MMOL/L (ref 10–20)
AST SERPL W P-5'-P-CCNC: 140 U/L (ref 9–39)
BILIRUB SERPL-MCNC: 0.6 MG/DL (ref 0–1.2)
BUN SERPL-MCNC: 20 MG/DL (ref 6–23)
CALCIUM SERPL-MCNC: 8.1 MG/DL (ref 8.6–10.3)
CHLORIDE SERPL-SCNC: 108 MMOL/L (ref 98–107)
CO2 SERPL-SCNC: 21 MMOL/L (ref 21–32)
CREAT SERPL-MCNC: 2.47 MG/DL (ref 0.5–1.05)
EGFRCR SERPLBLD CKD-EPI 2021: 21 ML/MIN/1.73M*2
ERYTHROCYTE [DISTWIDTH] IN BLOOD BY AUTOMATED COUNT: 19.2 % (ref 11.5–14.5)
GLUCOSE BLD MANUAL STRIP-MCNC: 213 MG/DL (ref 74–99)
GLUCOSE BLD MANUAL STRIP-MCNC: 270 MG/DL (ref 74–99)
GLUCOSE BLD MANUAL STRIP-MCNC: 299 MG/DL (ref 74–99)
GLUCOSE BLD MANUAL STRIP-MCNC: 316 MG/DL (ref 74–99)
GLUCOSE BLD MANUAL STRIP-MCNC: 341 MG/DL (ref 74–99)
GLUCOSE BLD MANUAL STRIP-MCNC: 384 MG/DL (ref 74–99)
GLUCOSE SERPL-MCNC: 326 MG/DL (ref 74–99)
HCT VFR BLD AUTO: 39.1 % (ref 36–46)
HGB BLD-MCNC: 12.1 G/DL (ref 12–16)
MCH RBC QN AUTO: 20.5 PG (ref 26–34)
MCHC RBC AUTO-ENTMCNC: 30.9 G/DL (ref 32–36)
MCV RBC AUTO: 66 FL (ref 80–100)
NRBC BLD-RTO: 0 /100 WBCS (ref 0–0)
PLATELET # BLD AUTO: 97 X10*3/UL (ref 150–450)
POTASSIUM SERPL-SCNC: 4.4 MMOL/L (ref 3.5–5.3)
PROCALCITONIN SERPL-MCNC: 0.18 NG/ML
PROT SERPL-MCNC: 4.8 G/DL (ref 6.4–8.2)
RBC # BLD AUTO: 5.91 X10*6/UL (ref 4–5.2)
SODIUM SERPL-SCNC: 136 MMOL/L (ref 136–145)
WBC # BLD AUTO: 20.3 X10*3/UL (ref 4.4–11.3)

## 2024-03-31 PROCEDURE — 94640 AIRWAY INHALATION TREATMENT: CPT

## 2024-03-31 PROCEDURE — 82947 ASSAY GLUCOSE BLOOD QUANT: CPT

## 2024-03-31 PROCEDURE — 2500000004 HC RX 250 GENERAL PHARMACY W/ HCPCS (ALT 636 FOR OP/ED): Performed by: INTERNAL MEDICINE

## 2024-03-31 PROCEDURE — 2500000001 HC RX 250 WO HCPCS SELF ADMINISTERED DRUGS (ALT 637 FOR MEDICARE OP): Performed by: INTERNAL MEDICINE

## 2024-03-31 PROCEDURE — 2500000001 HC RX 250 WO HCPCS SELF ADMINISTERED DRUGS (ALT 637 FOR MEDICARE OP): Performed by: NURSE PRACTITIONER

## 2024-03-31 PROCEDURE — 85027 COMPLETE CBC AUTOMATED: CPT | Performed by: NURSE PRACTITIONER

## 2024-03-31 PROCEDURE — 84075 ASSAY ALKALINE PHOSPHATASE: CPT | Performed by: INTERNAL MEDICINE

## 2024-03-31 PROCEDURE — 36415 COLL VENOUS BLD VENIPUNCTURE: CPT | Performed by: NURSE PRACTITIONER

## 2024-03-31 PROCEDURE — 2500000002 HC RX 250 W HCPCS SELF ADMINISTERED DRUGS (ALT 637 FOR MEDICARE OP, ALT 636 FOR OP/ED): Performed by: INTERNAL MEDICINE

## 2024-03-31 PROCEDURE — 2500000002 HC RX 250 W HCPCS SELF ADMINISTERED DRUGS (ALT 637 FOR MEDICARE OP, ALT 636 FOR OP/ED): Performed by: NURSE PRACTITIONER

## 2024-03-31 PROCEDURE — 99233 SBSQ HOSP IP/OBS HIGH 50: CPT | Performed by: INTERNAL MEDICINE

## 2024-03-31 PROCEDURE — 2500000004 HC RX 250 GENERAL PHARMACY W/ HCPCS (ALT 636 FOR OP/ED): Performed by: NURSE PRACTITIONER

## 2024-03-31 PROCEDURE — 1100000001 HC PRIVATE ROOM DAILY

## 2024-03-31 PROCEDURE — 2500000002 HC RX 250 W HCPCS SELF ADMINISTERED DRUGS (ALT 637 FOR MEDICARE OP, ALT 636 FOR OP/ED): Performed by: PHARMACIST

## 2024-03-31 RX ORDER — LACTULOSE 10 G/15ML
20 SOLUTION ORAL DAILY
Status: DISCONTINUED | OUTPATIENT
Start: 2024-03-31 | End: 2024-04-03

## 2024-03-31 RX ORDER — INSULIN GLARGINE 100 [IU]/ML
15 INJECTION, SOLUTION SUBCUTANEOUS EVERY 24 HOURS
Status: DISCONTINUED | OUTPATIENT
Start: 2024-03-31 | End: 2024-04-02

## 2024-03-31 RX ADMIN — METOPROLOL TARTRATE 50 MG: 50 TABLET, FILM COATED ORAL at 08:06

## 2024-03-31 RX ADMIN — SODIUM CHLORIDE, POTASSIUM CHLORIDE, SODIUM LACTATE AND CALCIUM CHLORIDE 125 ML/HR: 600; 310; 30; 20 INJECTION, SOLUTION INTRAVENOUS at 18:25

## 2024-03-31 RX ADMIN — HEPARIN SODIUM 5000 UNITS: 5000 INJECTION INTRAVENOUS; SUBCUTANEOUS at 14:26

## 2024-03-31 RX ADMIN — ASPIRIN 81 MG 81 MG: 81 TABLET ORAL at 08:06

## 2024-03-31 RX ADMIN — POLYETHYLENE GLYCOL 3350 17 G: 17 POWDER, FOR SOLUTION ORAL at 21:23

## 2024-03-31 RX ADMIN — HEPARIN SODIUM 5000 UNITS: 5000 INJECTION INTRAVENOUS; SUBCUTANEOUS at 22:00

## 2024-03-31 RX ADMIN — BUDESONIDE INHALATION 0.5 MG: 0.5 SUSPENSION RESPIRATORY (INHALATION) at 20:35

## 2024-03-31 RX ADMIN — CEFTRIAXONE SODIUM 1 G: 1 INJECTION, SOLUTION INTRAVENOUS at 08:01

## 2024-03-31 RX ADMIN — POLYETHYLENE GLYCOL 3350 17 G: 17 POWDER, FOR SOLUTION ORAL at 08:07

## 2024-03-31 RX ADMIN — INSULIN LISPRO 4 UNITS: 100 INJECTION, SOLUTION INTRAVENOUS; SUBCUTANEOUS at 14:43

## 2024-03-31 RX ADMIN — METHYLPREDNISOLONE SODIUM SUCCINATE 30 MG: 40 INJECTION, POWDER, FOR SOLUTION INTRAMUSCULAR; INTRAVENOUS at 11:57

## 2024-03-31 RX ADMIN — SENNOSIDES AND DOCUSATE SODIUM 2 TABLET: 8.6; 5 TABLET ORAL at 08:05

## 2024-03-31 RX ADMIN — SODIUM CHLORIDE, POTASSIUM CHLORIDE, SODIUM LACTATE AND CALCIUM CHLORIDE 125 ML/HR: 600; 310; 30; 20 INJECTION, SOLUTION INTRAVENOUS at 03:38

## 2024-03-31 RX ADMIN — INSULIN LISPRO 6 UNITS: 100 INJECTION, SOLUTION INTRAVENOUS; SUBCUTANEOUS at 11:58

## 2024-03-31 RX ADMIN — INSULIN LISPRO 8 UNITS: 100 INJECTION, SOLUTION INTRAVENOUS; SUBCUTANEOUS at 03:52

## 2024-03-31 RX ADMIN — ROSUVASTATIN CALCIUM 10 MG: 10 TABLET, FILM COATED ORAL at 21:24

## 2024-03-31 RX ADMIN — METHYLPREDNISOLONE SODIUM SUCCINATE 30 MG: 40 INJECTION, POWDER, FOR SOLUTION INTRAMUSCULAR; INTRAVENOUS at 18:25

## 2024-03-31 RX ADMIN — METHYLPREDNISOLONE SODIUM SUCCINATE 30 MG: 40 INJECTION, POWDER, FOR SOLUTION INTRAMUSCULAR; INTRAVENOUS at 03:38

## 2024-03-31 RX ADMIN — SENNOSIDES AND DOCUSATE SODIUM 2 TABLET: 8.6; 5 TABLET ORAL at 21:23

## 2024-03-31 RX ADMIN — METOPROLOL TARTRATE 50 MG: 50 TABLET, FILM COATED ORAL at 21:23

## 2024-03-31 RX ADMIN — INSULIN GLARGINE 15 UNITS: 100 INJECTION, SOLUTION SUBCUTANEOUS at 21:23

## 2024-03-31 RX ADMIN — FORMOTEROL FUMARATE DIHYDRATE 20 MCG: 20 SOLUTION RESPIRATORY (INHALATION) at 20:35

## 2024-03-31 RX ADMIN — Medication 2000 UNITS: at 08:06

## 2024-03-31 RX ADMIN — HEPARIN SODIUM 5000 UNITS: 5000 INJECTION INTRAVENOUS; SUBCUTANEOUS at 06:31

## 2024-03-31 RX ADMIN — LACTULOSE 20 G: 20 SOLUTION ORAL at 11:42

## 2024-03-31 RX ADMIN — INSULIN LISPRO 4 UNITS: 100 INJECTION, SOLUTION INTRAVENOUS; SUBCUTANEOUS at 18:18

## 2024-03-31 RX ADMIN — GLIMEPIRIDE 2 MG: 2 TABLET ORAL at 08:05

## 2024-03-31 RX ADMIN — INSULIN LISPRO 8 UNITS: 100 INJECTION, SOLUTION INTRAVENOUS; SUBCUTANEOUS at 08:09

## 2024-03-31 ASSESSMENT — COGNITIVE AND FUNCTIONAL STATUS - GENERAL
EATING MEALS: A LITTLE
DAILY ACTIVITIY SCORE: 18
MOVING FROM LYING ON BACK TO SITTING ON SIDE OF FLAT BED WITH BEDRAILS: A LITTLE
STANDING UP FROM CHAIR USING ARMS: A LOT
TOILETING: A LITTLE
DRESSING REGULAR UPPER BODY CLOTHING: A LITTLE
CLIMB 3 TO 5 STEPS WITH RAILING: A LOT
TURNING FROM BACK TO SIDE WHILE IN FLAT BAD: A LITTLE
MOVING TO AND FROM BED TO CHAIR: A LITTLE
PERSONAL GROOMING: A LITTLE
MOBILITY SCORE: 16
HELP NEEDED FOR BATHING: A LITTLE
WALKING IN HOSPITAL ROOM: A LITTLE
DRESSING REGULAR LOWER BODY CLOTHING: A LITTLE

## 2024-03-31 ASSESSMENT — PAIN SCALES - GENERAL: PAINLEVEL_OUTOF10: 6

## 2024-03-31 ASSESSMENT — PAIN - FUNCTIONAL ASSESSMENT: PAIN_FUNCTIONAL_ASSESSMENT: 0-10

## 2024-03-31 ASSESSMENT — PAIN SCALES - WONG BAKER: WONGBAKER_NUMERICALRESPONSE: HURTS EVEN MORE

## 2024-03-31 NOTE — CARE PLAN
Pt is from home  and  has AMPACs of  18 and 15, reports  weakness. Message sent to  MD about  getting  PT and OT ordered    Sharon Figueroa, MSW, LSW

## 2024-03-31 NOTE — PROGRESS NOTES
"Yesy Ayala is a 68 y.o. female on day 2 of admission presenting with Shortness of breath.    Subjective   Feels constipated added lactulose and senna, blood glucose elevated likely secondary to steroids add Lantus 15 units in the morning, patient overall feels well, white count did go up up to 20,000 likely secondary to steroids, limited echo to be done tomorrow.       Objective     Physical Exam  Constitutional:       Appearance: Normal appearance.   HENT:      Head: Normocephalic.      Nose: Nose normal.      Mouth/Throat:      Mouth: Mucous membranes are dry.      Pharynx: Oropharynx is clear.   Cardiovascular:      Rate and Rhythm: Normal rate and regular rhythm.   Pulmonary:      Effort: Pulmonary effort is normal.   Abdominal:      General: Abdomen is flat. Bowel sounds are normal.      Palpations: Abdomen is soft.   Skin:     General: Skin is warm.      Capillary Refill: Capillary refill takes less than 2 seconds.   Neurological:      General: No focal deficit present.      Mental Status: She is alert.         Last Recorded Vitals  Blood pressure 110/68, pulse 71, temperature 37.2 °C (98.9 °F), temperature source Temporal, resp. rate 18, height 1.473 m (4' 10\"), weight 56.2 kg (124 lb), SpO2 95 %.  Intake/Output last 3 Shifts:  I/O last 3 completed shifts:  In: 3874.6 (68.9 mL/kg) [P.O.:240; I.V.:3634.6 (64.6 mL/kg)]  Out: - (0 mL/kg)   Weight: 56.2 kg     Relevant Results  Results for orders placed or performed during the hospital encounter of 03/29/24 (from the past 24 hour(s))   POCT GLUCOSE   Result Value Ref Range    POCT Glucose 277 (H) 74 - 99 mg/dL   Cortisol   Result Value Ref Range    Cortisol 21.0 (H) 2.5 - 20.0 ug/dL   POCT GLUCOSE   Result Value Ref Range    POCT Glucose 168 (H) 74 - 99 mg/dL   POCT GLUCOSE   Result Value Ref Range    POCT Glucose 227 (H) 74 - 99 mg/dL   POCT GLUCOSE   Result Value Ref Range    POCT Glucose 312 (H) 74 - 99 mg/dL   POCT GLUCOSE   Result Value Ref Range    POCT " Glucose 316 (H) 74 - 99 mg/dL   POCT GLUCOSE   Result Value Ref Range    POCT Glucose 384 (H) 74 - 99 mg/dL   CBC   Result Value Ref Range    WBC 20.3 (H) 4.4 - 11.3 x10*3/uL    nRBC 0.0 0.0 - 0.0 /100 WBCs    RBC 5.91 (H) 4.00 - 5.20 x10*6/uL    Hemoglobin 12.1 12.0 - 16.0 g/dL    Hematocrit 39.1 36.0 - 46.0 %    MCV 66 (L) 80 - 100 fL    MCH 20.5 (L) 26.0 - 34.0 pg    MCHC 30.9 (L) 32.0 - 36.0 g/dL    RDW 19.2 (H) 11.5 - 14.5 %    Platelets 97 (L) 150 - 450 x10*3/uL   Comprehensive metabolic panel   Result Value Ref Range    Glucose 326 (H) 74 - 99 mg/dL    Sodium 136 136 - 145 mmol/L    Potassium 4.4 3.5 - 5.3 mmol/L    Chloride 108 (H) 98 - 107 mmol/L    Bicarbonate 21 21 - 32 mmol/L    Anion Gap 11 10 - 20 mmol/L    Urea Nitrogen 20 6 - 23 mg/dL    Creatinine 2.47 (H) 0.50 - 1.05 mg/dL    eGFR 21 (L) >60 mL/min/1.73m*2    Calcium 8.1 (L) 8.6 - 10.3 mg/dL    Albumin 2.5 (L) 3.4 - 5.0 g/dL    Alkaline Phosphatase 81 33 - 136 U/L    Total Protein 4.8 (L) 6.4 - 8.2 g/dL     (H) 9 - 39 U/L    Bilirubin, Total 0.6 0.0 - 1.2 mg/dL     (H) 7 - 45 U/L       Imaging Results  Ct chest:  IMPRESSION:  1. Emphysematous changes are present. Subtle density RIGHT upper lobe  appears to be present which appears to be cavitary. Follow-up PET scan  and/or tissue sampling recommended for further assessment.  2. Scattered micronodules are stable.  3. Nonobstructing left-sided nephrolithiasis.  4. Atherosclerosis of the thoracic aorta and coronary arteries is  present.    Cxr:  IMPRESSION:  No acute cardiopulmonary abnormality.    Medications:  aspirin, 81 mg, oral, Daily  budesonide, 0.5 mg, nebulization, BID  cefTRIAXone, 1 g, intravenous, q24h  cholecalciferol, 2,000 Units, oral, Daily  influenza, 0.7 mL, intramuscular, During hospitalization  formoterol, 20 mcg, nebulization, BID  glimepiride, 2 mg, oral, Daily with breakfast  heparin (porcine), 5,000 Units, subcutaneous, q8h SONIDO  insulin glargine, 15 Units,  subcutaneous, q24h  insulin lispro, 0-10 Units, subcutaneous, q4h  lactulose, 20 g, oral, Daily  methylPREDNISolone sodium succinate (PF), 30 mg, intravenous, q8h  metoprolol tartrate, 50 mg, oral, BID  polyethylene glycol, 17 g, oral, BID  rosuvastatin, 10 mg, oral, Nightly  sennosides-docusate sodium, 2 tablet, oral, BID       PRN medications: albuterol, dextrose, dextrose, glucagon, glucagon, ipratropium-albuteroL     Assessment/Plan   RUL cavitary lesion  - to be seen  pulmonary  -on iv steroids    2. UTI  -iv ceftriaxone    3. Hyperglycemia with DMII  -resume glimepiride, added lantus 15 units daily  -on SSI    4. HLD  -rosuvastatin    5. Elevated LFTs  -ck acute hep panel  -holding statin    6. AMARILYS on CKDIII  -at baseline around 1.5 currently 2.4 monitor closely    7. Non MI troponin elevation  -limited echo in am seen by cardiology    DVT Prophylaxis:  Heparin subq    Renita Weaver MD  [facility]

## 2024-03-31 NOTE — CARE PLAN
The patient's goals for the shift include      The clinical goals for the shift include pt will be safe throughout shift    Over the shift, the patient did not make progress toward the following goals. Barriers to progression include NA. Recommendations to address these barriers include N/A.

## 2024-03-31 NOTE — PROGRESS NOTES
"Yesy Ayala is a 68 y.o. female on day 2 of admission presenting with Shortness of breath.    Subjective   Feels \"a little better\".  Denies shortness of breath, cough or pain, but does admit to dyspnea on exertion and severe constipation.  On room air.  TSH and cortisol were normal.  Procalcitonin level is pending.     Objective   Physical Exam  Vitals  Blood pressure 110/68, pulse 71, temperature 37.2 °C (98.9 °F), temperature source Temporal, resp. rate 18, height 1.473 m (4' 10\"), weight 56.2 kg (124 lb), SpO2 95 %.  Intake/Output last 3 Shifts:  I/O last 3 completed shifts:  In: 3874.6 (68.9 mL/kg) [P.O.:240; I.V.:3634.6 (64.6 mL/kg)]  Out: - (0 mL/kg)   Weight: 56.2 kg     General-awake, alert and in no acute distress.  Lungs-clear, without wheezes, rales or rhonchi.  Heart-regular rate and rhythm.  Abdomen-positive bowel sounds.  Extremities-no edema.  Skin-no rashes.    Scheduled medications  aspirin, 81 mg, oral, Daily  budesonide, 0.5 mg, nebulization, BID  cefTRIAXone, 1 g, intravenous, q24h  cholecalciferol, 2,000 Units, oral, Daily  influenza, 0.7 mL, intramuscular, During hospitalization  formoterol, 20 mcg, nebulization, BID  glimepiride, 2 mg, oral, Daily with breakfast  heparin (porcine), 5,000 Units, subcutaneous, q8h SONIDO  insulin glargine, 15 Units, subcutaneous, q24h  insulin lispro, 0-10 Units, subcutaneous, q4h  methylPREDNISolone sodium succinate (PF), 30 mg, intravenous, q8h  metoprolol tartrate, 50 mg, oral, BID  polyethylene glycol, 17 g, oral, BID  rosuvastatin, 10 mg, oral, Nightly  sennosides-docusate sodium, 2 tablet, oral, BID      Continuous medications  lactated Ringer's, 125 mL/hr, Last Rate: 125 mL/hr (03/31/24 0555)      PRN medications  PRN medications: albuterol, dextrose, dextrose, glucagon, glucagon, ipratropium-albuteroL     Results for orders placed or performed during the hospital encounter of 03/29/24 (from the past 24 hour(s))   POCT GLUCOSE   Result Value Ref Range    " POCT Glucose 277 (H) 74 - 99 mg/dL   Cortisol   Result Value Ref Range    Cortisol 21.0 (H) 2.5 - 20.0 ug/dL   POCT GLUCOSE   Result Value Ref Range    POCT Glucose 168 (H) 74 - 99 mg/dL   POCT GLUCOSE   Result Value Ref Range    POCT Glucose 227 (H) 74 - 99 mg/dL   POCT GLUCOSE   Result Value Ref Range    POCT Glucose 312 (H) 74 - 99 mg/dL   POCT GLUCOSE   Result Value Ref Range    POCT Glucose 316 (H) 74 - 99 mg/dL   POCT GLUCOSE   Result Value Ref Range    POCT Glucose 384 (H) 74 - 99 mg/dL   CBC   Result Value Ref Range    WBC 20.3 (H) 4.4 - 11.3 x10*3/uL    nRBC 0.0 0.0 - 0.0 /100 WBCs    RBC 5.91 (H) 4.00 - 5.20 x10*6/uL    Hemoglobin 12.1 12.0 - 16.0 g/dL    Hematocrit 39.1 36.0 - 46.0 %    MCV 66 (L) 80 - 100 fL    MCH 20.5 (L) 26.0 - 34.0 pg    MCHC 30.9 (L) 32.0 - 36.0 g/dL    RDW 19.2 (H) 11.5 - 14.5 %    Platelets 97 (L) 150 - 450 x10*3/uL   Comprehensive metabolic panel   Result Value Ref Range    Glucose 326 (H) 74 - 99 mg/dL    Sodium 136 136 - 145 mmol/L    Potassium 4.4 3.5 - 5.3 mmol/L    Chloride 108 (H) 98 - 107 mmol/L    Bicarbonate 21 21 - 32 mmol/L    Anion Gap 11 10 - 20 mmol/L    Urea Nitrogen 20 6 - 23 mg/dL    Creatinine 2.47 (H) 0.50 - 1.05 mg/dL    eGFR 21 (L) >60 mL/min/1.73m*2    Calcium 8.1 (L) 8.6 - 10.3 mg/dL    Albumin 2.5 (L) 3.4 - 5.0 g/dL    Alkaline Phosphatase 81 33 - 136 U/L    Total Protein 4.8 (L) 6.4 - 8.2 g/dL     (H) 9 - 39 U/L    Bilirubin, Total 0.6 0.0 - 1.2 mg/dL     (H) 7 - 45 U/L      Assessment:  68-year-old woman with a history of RSV, diabetes, hypertension, peripheral vascular disease, coronary artery disease-status post stent, chronic renal failure, COPD, small bilateral pulmonary nodules, lower extremity claudication, aorto-bifemoral bypass and thrombocytopenia, admitted with dyspnea on exertion, weakness, sleepiness and decreased appetite, and found to have an elevated white blood count with a left shift, normal BNP and an incidental finding on  a CT scan of the chest of a new small right upper lobe cavitary pulmonary nodule/opacity, of uncertain etiology.  The differential diagnosis includes:  Early malignancy; artifact from a dilated bronchus seen in cross-section; partially occluded small airway due to mucous plugging; or atypical infection.  The cavitary nodule is too small to characterize at this time.  There is no bronchospasm today.  The cause of her weakness and fatigue is uncertain.  There is no evidence of hypothyroidism or adrenal insufficiency.  Doubt neuromuscular disorder or vasculitis.    Recommend:  1.  Continue DuoNeb, budesonide, formoterol, Solu-Medrol ceftriaxone and subcutaneous heparin.  2.  Incentive spirometry and Acapella treatment.  3.  Keep oxygen saturation approximately 92 to 95%.  4.  Check procalcitonin level--pending.  5.  Repeat CT scan of the chest in approximately 3 months and/or consider a PET scan as an outpatient for follow-up of the patient's right upper lobe cavitary nodule.  6.  Follow-up with her outpatient pulmonologist, Dr. Curt Gurrola, after discharge.  7.  Will sign off at this time; please recall if further assistance is needed.    Arnulfo Oropeza MD

## 2024-03-31 NOTE — CARE PLAN
The patient's goals for the shift include      The clinical goals for the shift include Maintain patient safety

## 2024-04-01 ENCOUNTER — APPOINTMENT (OUTPATIENT)
Dept: RADIOLOGY | Facility: HOSPITAL | Age: 69
DRG: 190 | End: 2024-04-01
Payer: MEDICARE

## 2024-04-01 ENCOUNTER — APPOINTMENT (OUTPATIENT)
Dept: CARDIOLOGY | Facility: HOSPITAL | Age: 69
DRG: 190 | End: 2024-04-01
Payer: MEDICARE

## 2024-04-01 LAB
ALBUMIN SERPL BCP-MCNC: 2.6 G/DL (ref 3.4–5)
ALP SERPL-CCNC: 83 U/L (ref 33–136)
ALT SERPL W P-5'-P-CCNC: 220 U/L (ref 7–45)
ANION GAP SERPL CALC-SCNC: 10 MMOL/L (ref 10–20)
AORTIC VALVE MEAN GRADIENT: 6 MMHG
AORTIC VALVE PEAK VELOCITY: 1.65 M/S
APPEARANCE UR: ABNORMAL
AST SERPL W P-5'-P-CCNC: 105 U/L (ref 9–39)
AV PEAK GRADIENT: 10.9 MMHG
BASOPHILS # BLD AUTO: 0.04 X10*3/UL (ref 0–0.1)
BASOPHILS NFR BLD AUTO: 0.2 %
BILIRUB SERPL-MCNC: 0.5 MG/DL (ref 0–1.2)
BILIRUB UR STRIP.AUTO-MCNC: NEGATIVE MG/DL
BUN SERPL-MCNC: 22 MG/DL (ref 6–23)
CALCIUM SERPL-MCNC: 8.7 MG/DL (ref 8.6–10.3)
CHLORIDE SERPL-SCNC: 112 MMOL/L (ref 98–107)
CK SERPL-CCNC: 2350 U/L (ref 0–215)
CO2 SERPL-SCNC: 22 MMOL/L (ref 21–32)
COLOR UR: COLORLESS
CREAT SERPL-MCNC: 2.48 MG/DL (ref 0.5–1.05)
CREAT UR-MCNC: 82.9 MG/DL (ref 20–320)
EGFRCR SERPLBLD CKD-EPI 2021: 21 ML/MIN/1.73M*2
EJECTION FRACTION APICAL 4 CHAMBER: 55.7
EOSINOPHIL # BLD AUTO: 0 X10*3/UL (ref 0–0.7)
EOSINOPHIL NFR BLD AUTO: 0 %
ERYTHROCYTE [DISTWIDTH] IN BLOOD BY AUTOMATED COUNT: 19.9 % (ref 11.5–14.5)
ERYTHROCYTE [DISTWIDTH] IN BLOOD BY AUTOMATED COUNT: 19.9 % (ref 11.5–14.5)
EST. AVERAGE GLUCOSE BLD GHB EST-MCNC: 358 MG/DL
GLUCOSE BLD MANUAL STRIP-MCNC: 138 MG/DL (ref 74–99)
GLUCOSE BLD MANUAL STRIP-MCNC: 215 MG/DL (ref 74–99)
GLUCOSE BLD MANUAL STRIP-MCNC: 273 MG/DL (ref 74–99)
GLUCOSE BLD MANUAL STRIP-MCNC: 280 MG/DL (ref 74–99)
GLUCOSE BLD MANUAL STRIP-MCNC: 283 MG/DL (ref 74–99)
GLUCOSE BLD MANUAL STRIP-MCNC: 312 MG/DL (ref 74–99)
GLUCOSE BLD MANUAL STRIP-MCNC: 333 MG/DL (ref 74–99)
GLUCOSE BLD MANUAL STRIP-MCNC: 95 MG/DL (ref 74–99)
GLUCOSE SERPL-MCNC: 116 MG/DL (ref 74–99)
GLUCOSE UR STRIP.AUTO-MCNC: NORMAL MG/DL
HBA1C MFR BLD: 14.1 %
HCT VFR BLD AUTO: 41.6 % (ref 36–46)
HCT VFR BLD AUTO: 41.6 % (ref 36–46)
HGB BLD-MCNC: 12.9 G/DL (ref 12–16)
HGB BLD-MCNC: 12.9 G/DL (ref 12–16)
IMM GRANULOCYTES # BLD AUTO: 0.14 X10*3/UL (ref 0–0.7)
IMM GRANULOCYTES NFR BLD AUTO: 0.5 % (ref 0–0.9)
KETONES UR STRIP.AUTO-MCNC: NEGATIVE MG/DL
LEFT VENTRICLE INTERNAL DIMENSION DIASTOLE: 4.8 CM (ref 3.5–6)
LEUKOCYTE ESTERASE UR QL STRIP.AUTO: NEGATIVE
LV EJECTION FRACTION BIPLANE: 57 %
LYMPHOCYTES # BLD AUTO: 3.73 X10*3/UL (ref 1.2–4.8)
LYMPHOCYTES NFR BLD AUTO: 14.2 %
MCH RBC QN AUTO: 20.9 PG (ref 26–34)
MCH RBC QN AUTO: 20.9 PG (ref 26–34)
MCHC RBC AUTO-ENTMCNC: 31 G/DL (ref 32–36)
MCHC RBC AUTO-ENTMCNC: 31 G/DL (ref 32–36)
MCV RBC AUTO: 67 FL (ref 80–100)
MCV RBC AUTO: 67 FL (ref 80–100)
MITRAL VALVE E/A RATIO: 0.6
MITRAL VALVE E/E' RATIO: 10.03
MONOCYTES # BLD AUTO: 1.72 X10*3/UL (ref 0.1–1)
MONOCYTES NFR BLD AUTO: 6.6 %
MUCOUS THREADS #/AREA URNS AUTO: NORMAL /LPF
NEUTROPHILS # BLD AUTO: 20.57 X10*3/UL (ref 1.2–7.7)
NEUTROPHILS NFR BLD AUTO: 78.5 %
NITRITE UR QL STRIP.AUTO: NEGATIVE
NRBC BLD-RTO: 0.1 /100 WBCS (ref 0–0)
NRBC BLD-RTO: 0.1 /100 WBCS (ref 0–0)
PH UR STRIP.AUTO: 5.5 [PH]
PLATELET # BLD AUTO: 107 X10*3/UL (ref 150–450)
PLATELET # BLD AUTO: 107 X10*3/UL (ref 150–450)
POTASSIUM SERPL-SCNC: 3.8 MMOL/L (ref 3.5–5.3)
PROT SERPL-MCNC: 5 G/DL (ref 6.4–8.2)
PROT UR STRIP.AUTO-MCNC: ABNORMAL MG/DL
PROT UR-ACNC: 144 MG/DL (ref 5–24)
PROT/CREAT UR: 1.74 MG/MG CREAT (ref 0–0.17)
RBC # BLD AUTO: 6.18 X10*6/UL (ref 4–5.2)
RBC # BLD AUTO: 6.18 X10*6/UL (ref 4–5.2)
RBC # UR STRIP.AUTO: ABNORMAL /UL
RBC #/AREA URNS AUTO: NORMAL /HPF
RIGHT VENTRICLE PEAK SYSTOLIC PRESSURE: 22.4 MMHG
SODIUM SERPL-SCNC: 140 MMOL/L (ref 136–145)
SP GR UR STRIP.AUTO: 1.01
TRICUSPID ANNULAR PLANE SYSTOLIC EXCURSION: 1.4 CM
UROBILINOGEN UR STRIP.AUTO-MCNC: NORMAL MG/DL
WBC # BLD AUTO: 26.2 X10*3/UL (ref 4.4–11.3)
WBC # BLD AUTO: 26.2 X10*3/UL (ref 4.4–11.3)
WBC #/AREA URNS AUTO: NORMAL /HPF

## 2024-04-01 PROCEDURE — 1100000001 HC PRIVATE ROOM DAILY

## 2024-04-01 PROCEDURE — 2500000001 HC RX 250 WO HCPCS SELF ADMINISTERED DRUGS (ALT 637 FOR MEDICARE OP): Performed by: INTERNAL MEDICINE

## 2024-04-01 PROCEDURE — 99222 1ST HOSP IP/OBS MODERATE 55: CPT | Performed by: INTERNAL MEDICINE

## 2024-04-01 PROCEDURE — 36415 COLL VENOUS BLD VENIPUNCTURE: CPT | Performed by: INTERNAL MEDICINE

## 2024-04-01 PROCEDURE — 93308 TTE F-UP OR LMTD: CPT

## 2024-04-01 PROCEDURE — 99232 SBSQ HOSP IP/OBS MODERATE 35: CPT | Performed by: NURSE PRACTITIONER

## 2024-04-01 PROCEDURE — 80053 COMPREHEN METABOLIC PANEL: CPT | Performed by: INTERNAL MEDICINE

## 2024-04-01 PROCEDURE — 81001 URINALYSIS AUTO W/SCOPE: CPT | Performed by: INTERNAL MEDICINE

## 2024-04-01 PROCEDURE — 99221 1ST HOSP IP/OBS SF/LOW 40: CPT | Performed by: INTERNAL MEDICINE

## 2024-04-01 PROCEDURE — 2500000004 HC RX 250 GENERAL PHARMACY W/ HCPCS (ALT 636 FOR OP/ED): Performed by: NURSE PRACTITIONER

## 2024-04-01 PROCEDURE — 94640 AIRWAY INHALATION TREATMENT: CPT | Mod: MUE

## 2024-04-01 PROCEDURE — 82947 ASSAY GLUCOSE BLOOD QUANT: CPT

## 2024-04-01 PROCEDURE — 76770 US EXAM ABDO BACK WALL COMP: CPT | Performed by: RADIOLOGY

## 2024-04-01 PROCEDURE — 76770 US EXAM ABDO BACK WALL COMP: CPT

## 2024-04-01 PROCEDURE — 82550 ASSAY OF CK (CPK): CPT | Performed by: NURSE PRACTITIONER

## 2024-04-01 PROCEDURE — 2500000002 HC RX 250 W HCPCS SELF ADMINISTERED DRUGS (ALT 637 FOR MEDICARE OP, ALT 636 FOR OP/ED): Performed by: NURSE PRACTITIONER

## 2024-04-01 PROCEDURE — 82570 ASSAY OF URINE CREATININE: CPT | Performed by: INTERNAL MEDICINE

## 2024-04-01 PROCEDURE — 2500000004 HC RX 250 GENERAL PHARMACY W/ HCPCS (ALT 636 FOR OP/ED): Performed by: INTERNAL MEDICINE

## 2024-04-01 PROCEDURE — 99233 SBSQ HOSP IP/OBS HIGH 50: CPT | Performed by: INTERNAL MEDICINE

## 2024-04-01 PROCEDURE — 2500000002 HC RX 250 W HCPCS SELF ADMINISTERED DRUGS (ALT 637 FOR MEDICARE OP, ALT 636 FOR OP/ED): Performed by: INTERNAL MEDICINE

## 2024-04-01 PROCEDURE — 2500000002 HC RX 250 W HCPCS SELF ADMINISTERED DRUGS (ALT 637 FOR MEDICARE OP, ALT 636 FOR OP/ED): Performed by: PHARMACIST

## 2024-04-01 PROCEDURE — 85025 COMPLETE CBC W/AUTO DIFF WBC: CPT | Performed by: NURSE PRACTITIONER

## 2024-04-01 PROCEDURE — 2500000001 HC RX 250 WO HCPCS SELF ADMINISTERED DRUGS (ALT 637 FOR MEDICARE OP): Performed by: NURSE PRACTITIONER

## 2024-04-01 PROCEDURE — 93308 TTE F-UP OR LMTD: CPT | Performed by: INTERNAL MEDICINE

## 2024-04-01 RX ADMIN — INSULIN LISPRO 6 UNITS: 100 INJECTION, SOLUTION INTRAVENOUS; SUBCUTANEOUS at 17:46

## 2024-04-01 RX ADMIN — BUDESONIDE INHALATION 0.5 MG: 0.5 SUSPENSION RESPIRATORY (INHALATION) at 19:31

## 2024-04-01 RX ADMIN — INSULIN LISPRO 4 UNITS: 100 INJECTION, SOLUTION INTRAVENOUS; SUBCUTANEOUS at 13:06

## 2024-04-01 RX ADMIN — HEPARIN SODIUM 5000 UNITS: 5000 INJECTION INTRAVENOUS; SUBCUTANEOUS at 06:33

## 2024-04-01 RX ADMIN — SODIUM CHLORIDE, POTASSIUM CHLORIDE, SODIUM LACTATE AND CALCIUM CHLORIDE 125 ML/HR: 600; 310; 30; 20 INJECTION, SOLUTION INTRAVENOUS at 02:20

## 2024-04-01 RX ADMIN — HEPARIN SODIUM 5000 UNITS: 5000 INJECTION INTRAVENOUS; SUBCUTANEOUS at 22:18

## 2024-04-01 RX ADMIN — METOPROLOL TARTRATE 50 MG: 50 TABLET, FILM COATED ORAL at 22:18

## 2024-04-01 RX ADMIN — ASPIRIN 81 MG 81 MG: 81 TABLET ORAL at 09:06

## 2024-04-01 RX ADMIN — INSULIN GLARGINE 15 UNITS: 100 INJECTION, SOLUTION SUBCUTANEOUS at 22:20

## 2024-04-01 RX ADMIN — FORMOTEROL FUMARATE DIHYDRATE 20 MCG: 20 SOLUTION RESPIRATORY (INHALATION) at 19:31

## 2024-04-01 RX ADMIN — CEFTRIAXONE SODIUM 1 G: 1 INJECTION, SOLUTION INTRAVENOUS at 09:10

## 2024-04-01 RX ADMIN — GLIMEPIRIDE 2 MG: 2 TABLET ORAL at 09:06

## 2024-04-01 RX ADMIN — HEPARIN SODIUM 5000 UNITS: 5000 INJECTION INTRAVENOUS; SUBCUTANEOUS at 13:06

## 2024-04-01 RX ADMIN — Medication 2000 UNITS: at 09:06

## 2024-04-01 RX ADMIN — FORMOTEROL FUMARATE DIHYDRATE 20 MCG: 20 SOLUTION RESPIRATORY (INHALATION) at 11:03

## 2024-04-01 RX ADMIN — INSULIN LISPRO 8 UNITS: 100 INJECTION, SOLUTION INTRAVENOUS; SUBCUTANEOUS at 00:55

## 2024-04-01 RX ADMIN — INSULIN LISPRO 8 UNITS: 100 INJECTION, SOLUTION INTRAVENOUS; SUBCUTANEOUS at 22:18

## 2024-04-01 RX ADMIN — INSULIN LISPRO 6 UNITS: 100 INJECTION, SOLUTION INTRAVENOUS; SUBCUTANEOUS at 23:56

## 2024-04-01 RX ADMIN — METHYLPREDNISOLONE SODIUM SUCCINATE 30 MG: 40 INJECTION, POWDER, FOR SOLUTION INTRAMUSCULAR; INTRAVENOUS at 05:08

## 2024-04-01 RX ADMIN — METHYLPREDNISOLONE SODIUM SUCCINATE 30 MG: 40 INJECTION, POWDER, FOR SOLUTION INTRAMUSCULAR; INTRAVENOUS at 11:10

## 2024-04-01 RX ADMIN — BUDESONIDE INHALATION 0.5 MG: 0.5 SUSPENSION RESPIRATORY (INHALATION) at 11:03

## 2024-04-01 RX ADMIN — METHYLPREDNISOLONE SODIUM SUCCINATE 30 MG: 40 INJECTION, POWDER, FOR SOLUTION INTRAMUSCULAR; INTRAVENOUS at 18:59

## 2024-04-01 ASSESSMENT — COGNITIVE AND FUNCTIONAL STATUS - GENERAL
STANDING UP FROM CHAIR USING ARMS: A LOT
MOBILITY SCORE: 16
TURNING FROM BACK TO SIDE WHILE IN FLAT BAD: A LITTLE
TOILETING: A LITTLE
DRESSING REGULAR UPPER BODY CLOTHING: A LITTLE
PERSONAL GROOMING: A LITTLE
CLIMB 3 TO 5 STEPS WITH RAILING: A LOT
STANDING UP FROM CHAIR USING ARMS: A LOT
DAILY ACTIVITIY SCORE: 18
MOVING TO AND FROM BED TO CHAIR: A LITTLE
DRESSING REGULAR UPPER BODY CLOTHING: A LITTLE
EATING MEALS: A LITTLE
HELP NEEDED FOR BATHING: A LITTLE
HELP NEEDED FOR BATHING: A LITTLE
PERSONAL GROOMING: A LITTLE
DAILY ACTIVITIY SCORE: 18
MOBILITY SCORE: 16
DRESSING REGULAR LOWER BODY CLOTHING: A LITTLE
STANDING UP FROM CHAIR USING ARMS: A LOT
MOVING FROM LYING ON BACK TO SITTING ON SIDE OF FLAT BED WITH BEDRAILS: A LITTLE
DRESSING REGULAR LOWER BODY CLOTHING: A LITTLE
TURNING FROM BACK TO SIDE WHILE IN FLAT BAD: A LITTLE
WALKING IN HOSPITAL ROOM: A LITTLE
TOILETING: A LITTLE
TURNING FROM BACK TO SIDE WHILE IN FLAT BAD: A LITTLE
EATING MEALS: A LITTLE
MOVING FROM LYING ON BACK TO SITTING ON SIDE OF FLAT BED WITH BEDRAILS: A LITTLE
HELP NEEDED FOR BATHING: A LITTLE
DRESSING REGULAR UPPER BODY CLOTHING: A LITTLE
PERSONAL GROOMING: A LITTLE
MOVING FROM LYING ON BACK TO SITTING ON SIDE OF FLAT BED WITH BEDRAILS: A LITTLE
EATING MEALS: A LITTLE
MOVING TO AND FROM BED TO CHAIR: A LITTLE
DAILY ACTIVITIY SCORE: 18
WALKING IN HOSPITAL ROOM: A LITTLE
CLIMB 3 TO 5 STEPS WITH RAILING: A LOT
WALKING IN HOSPITAL ROOM: A LITTLE
DRESSING REGULAR LOWER BODY CLOTHING: A LITTLE
MOBILITY SCORE: 16
TOILETING: A LITTLE
CLIMB 3 TO 5 STEPS WITH RAILING: A LOT
MOVING TO AND FROM BED TO CHAIR: A LITTLE

## 2024-04-01 ASSESSMENT — ENCOUNTER SYMPTOMS
APPETITE CHANGE: 1
UNEXPECTED WEIGHT CHANGE: 1
SHORTNESS OF BREATH: 1
WEAKNESS: 1

## 2024-04-01 ASSESSMENT — PAIN - FUNCTIONAL ASSESSMENT
PAIN_FUNCTIONAL_ASSESSMENT: 0-10

## 2024-04-01 ASSESSMENT — PAIN SCALES - GENERAL
PAINLEVEL_OUTOF10: 0 - NO PAIN

## 2024-04-01 ASSESSMENT — ACTIVITIES OF DAILY LIVING (ADL): LACK_OF_TRANSPORTATION: NO

## 2024-04-01 NOTE — CONSULTS
Inpatient consult to Endocrinology  Consult performed by: Juan Manuel العلي MD  Consult ordered by: REHANA Alcantar      Reason For Consult  Diabetes    History Of Present Illness  Yesy Ayala is a 68 y.o. female     Duration of type 2 diabetes mellitus:  4 years  Complications:  CKD  CAD    Methylprednisolone 30 mg IV q8h    Started glargine 15 units at bedtime last night    Patient had required insulin while admitted and on glucocorticoid 2 months ago with pulmonary infections.  She denies going home on any insulin at that time.     Home regimen:  Glimepiride 2 mg/day    Last A1c 9.3% (11/2/24)      Medications    Current Facility-Administered Medications:     albuterol 2.5 mg /3 mL (0.083 %) nebulizer solution 2.5 mg, 2.5 mg, nebulization, q6h PRN, Karsten Yates PharmD    aspirin chewable tablet 81 mg, 81 mg, oral, Daily, REHANA Alcantar, 81 mg at 03/31/24 0806    budesonide (Pulmicort) 0.5 mg/2 mL nebulizer solution 0.5 mg, 0.5 mg, nebulization, BID, Karsten Yates PharmD, 0.5 mg at 03/31/24 2035    cefTRIAXone (Rocephin) IVPB 1 g, 1 g, intravenous, q24h, REHANA Alcantar, Stopped at 03/31/24 0831    cholecalciferol (Vitamin D-3) tablet 2,000 Units, 2,000 Units, oral, Daily, REHANA Alcantar, 2,000 Units at 03/31/24 0806    dextrose 50 % injection 12.5 g, 12.5 g, intravenous, q15 min PRN, REHANA Alcantar    dextrose 50 % injection 25 g, 25 g, intravenous, q15 min PRN, REHANA Alcantar    flu vaccine, quadrivalent, high-dose, preservative free, age 65y+ (FLUZONE), 0.7 mL, intramuscular, During hospitalization, Jarocho Vieyra MD    formoterol (Perforomist) 20 mcg/2 mL nebulizer solution 20 mcg, 20 mcg, nebulization, BID, Roise LaytonD, 20 mcg at 03/31/24 2035    glimepiride (Amaryl) tablet 2 mg, 2 mg, oral, Daily with breakfast, Renita Weaver MD, 2 mg at 03/31/24 0805    glucagon (Glucagen) injection 1 mg, 1 mg, intramuscular, q15 min PRN, Carter J Ford,  APRN-CNP    glucagon (Glucagen) injection 1 mg, 1 mg, intramuscular, q15 min PRN, REHANA Alcantar    heparin (porcine) injection 5,000 Units, 5,000 Units, subcutaneous, q8h SONIDO, REHANA Alcantar, 5,000 Units at 04/01/24 0633    insulin glargine (Lantus) injection 15 Units, 15 Units, subcutaneous, q24h, Renita Weaver MD, 15 Units at 03/31/24 2123    insulin lispro (HumaLOG) injection 0-10 Units, 0-10 Units, subcutaneous, q4h, REHANA Alcantar, 8 Units at 04/01/24 0055    ipratropium-albuteroL (Duo-Neb) 0.5-2.5 mg/3 mL nebulizer solution 3 mL, 3 mL, nebulization, 4x daily PRN, REHANA Alcantar, 3 mL at 03/30/24 2019    lactated Ringer's infusion, 125 mL/hr, intravenous, Continuous, Renita Weaver MD, Last Rate: 125 mL/hr at 04/01/24 0220, 125 mL/hr at 04/01/24 0220    lactulose 20 gram/30 mL oral solution 20 g, 20 g, oral, Daily, Renita Weaver MD, 20 g at 03/31/24 1142    methylPREDNISolone sod succinate (SOLU-Medrol) 40 mg/mL injection 30 mg, 30 mg, intravenous, q8h, Arnulfo Oropeza MD, 30 mg at 04/01/24 0508    metoprolol tartrate (Lopressor) tablet 50 mg, 50 mg, oral, BID, REHANA Alcantar, 50 mg at 03/31/24 2123    rosuvastatin (Crestor) tablet 10 mg, 10 mg, oral, Nightly, REHANA Alcantar, 10 mg at 03/31/24 2124    sennosides-docusate sodium (Candida-Colace) 8.6-50 mg per tablet 2 tablet, 2 tablet, oral, BID, Renita Weaver MD, 2 tablet at 03/31/24 2123     Past Medical History  She has a past medical history of Body mass index (BMI) 27.0-27.9, adult (01/18/2020), Other conditions influencing health status, Personal history of other diseases of the circulatory system, Personal history of other diseases of the circulatory system, Personal history of other diseases of the circulatory system, and Personal history of other endocrine, nutritional and metabolic disease.    Surgical History  She has a past surgical history that includes Other surgical history  "(08/22/2013); Other surgical history (08/22/2013); and Other surgical history (02/20/2014).     Social History  She reports that she has been smoking cigarettes. She has a 20.00 pack-year smoking history. She has never been exposed to tobacco smoke. She has never used smokeless tobacco. She reports that she does not currently use alcohol. She reports that she does not currently use drugs after having used the following drugs: Marijuana.    Family History  Family History   Problem Relation Name Age of Onset    COPD Mother      No Known Problems Father      No Known Problems Sister      No Known Problems Brother         Allergies  Penicillin, Metformin, and Sulfa (sulfonamide antibiotics)    Review of Systems   Constitutional:  Positive for appetite change and unexpected weight change (loss).   Respiratory:  Positive for shortness of breath (improved).    Neurological:  Positive for weakness.         Last Recorded Vitals  Blood pressure 111/58, pulse 65, temperature 36.6 °C (97.8 °F), temperature source Temporal, resp. rate 18, height 1.473 m (4' 10\"), weight 56.2 kg (124 lb), SpO2 99 %.    Physical Exam  Constitutional:       General: She is not in acute distress.  HENT:      Head: Normocephalic.   Eyes:      Extraocular Movements: Extraocular movements intact.   Neurological:      Mental Status: She is alert.   Psychiatric:         Mood and Affect: Affect normal.          Relevant Results  Lab Results   Component Value Date    POCGLU 138 (H) 04/01/2024    POCGLU 280 (H) 04/01/2024    POCGLU 333 (H) 04/01/2024    POCGLU 341 (H) 03/31/2024    POCGLU 270 (H) 03/31/2024    GLUCOSE 116 (H) 04/01/2024    GLUCOSE 326 (H) 03/31/2024    GLUCOSE 121 (H) 03/30/2024    GLUCOSE 493 (HH) 03/29/2024    GLUCOSE  03/29/2024      Comment:      QNS for repeat. Needs redraw  Corrected result: Previously reported as 141 mg/dL (reference range: 74-99 mg/dL) on 3/29/2024 at 1111 EDT.      Latest Reference Range & Units 04/01/24 06:17 "   GLUCOSE 74 - 99 mg/dL 116 (H)   SODIUM 136 - 145 mmol/L 140   POTASSIUM 3.5 - 5.3 mmol/L 3.8   CHLORIDE 98 - 107 mmol/L 112 (H)   Bicarbonate 21 - 32 mmol/L 22   Anion Gap 10 - 20 mmol/L 10   Blood Urea Nitrogen 6 - 23 mg/dL 22   Creatinine 0.50 - 1.05 mg/dL 2.48 (H)   EGFR >60 mL/min/1.73m*2 21 (L)   Calcium 8.6 - 10.3 mg/dL 8.7   Albumin 3.4 - 5.0 g/dL 2.6 (L)   Alkaline Phosphatase 33 - 136 U/L 83   ALT 7 - 45 U/L 220 (H)   AST 9 - 39 U/L 105 (H)   Bilirubin Total 0.0 - 1.2 mg/dL 0.5         IMPRESSION  TYPE 2 DIABETES MELLITUS WITH HYPERGLYCEMIA  Insulin demand again exacerbated by glucocorticoid      RECOMMENDATIONS  Continue glargine 15 units/day  Continue lispro scale while admitted      Juan Manuel العلي MD

## 2024-04-01 NOTE — PROGRESS NOTES
"Yesy Ayala is a 68 y.o. female on day 3 of admission presenting with Shortness of breath.    Subjective   Wbc 26k, hepatitis panel negative,creatinine stable at 2.48       Objective     Physical Exam  Constitutional:       Appearance: Normal appearance.   HENT:      Head: Normocephalic.      Nose: Nose normal.      Mouth/Throat:      Mouth: Mucous membranes are dry.      Pharynx: Oropharynx is clear.   Cardiovascular:      Rate and Rhythm: Normal rate and regular rhythm.   Pulmonary:      Effort: Pulmonary effort is normal.   Abdominal:      General: Abdomen is flat. Bowel sounds are normal.      Palpations: Abdomen is soft.   Skin:     General: Skin is warm.      Capillary Refill: Capillary refill takes less than 2 seconds.   Neurological:      General: No focal deficit present.      Mental Status: She is alert.         Last Recorded Vitals  Blood pressure 104/59, pulse 59, temperature 36.7 °C (98 °F), resp. rate 18, height 1.473 m (4' 10\"), weight 56.2 kg (124 lb), SpO2 96 %.  Intake/Output last 3 Shifts:  I/O last 3 completed shifts:  In: 5480 (97.4 mL/kg) [P.O.:480; I.V.:5000 (88.9 mL/kg)]  Out: - (0 mL/kg)   Weight: 56.2 kg     Relevant Results  Results for orders placed or performed during the hospital encounter of 03/29/24 (from the past 24 hour(s))   POCT GLUCOSE   Result Value Ref Range    POCT Glucose 213 (H) 74 - 99 mg/dL   POCT GLUCOSE   Result Value Ref Range    POCT Glucose 270 (H) 74 - 99 mg/dL   POCT GLUCOSE   Result Value Ref Range    POCT Glucose 341 (H) 74 - 99 mg/dL   POCT GLUCOSE   Result Value Ref Range    POCT Glucose 333 (H) 74 - 99 mg/dL   POCT GLUCOSE   Result Value Ref Range    POCT Glucose 280 (H) 74 - 99 mg/dL   POCT GLUCOSE   Result Value Ref Range    POCT Glucose 138 (H) 74 - 99 mg/dL   CBC   Result Value Ref Range    WBC 26.2 (H) 4.4 - 11.3 x10*3/uL    nRBC 0.1 (H) 0.0 - 0.0 /100 WBCs    RBC 6.18 (H) 4.00 - 5.20 x10*6/uL    Hemoglobin 12.9 12.0 - 16.0 g/dL    Hematocrit 41.6 36.0 - " 46.0 %    MCV 67 (L) 80 - 100 fL    MCH 20.9 (L) 26.0 - 34.0 pg    MCHC 31.0 (L) 32.0 - 36.0 g/dL    RDW 19.9 (H) 11.5 - 14.5 %    Platelets 107 (L) 150 - 450 x10*3/uL   Comprehensive metabolic panel   Result Value Ref Range    Glucose 116 (H) 74 - 99 mg/dL    Sodium 140 136 - 145 mmol/L    Potassium 3.8 3.5 - 5.3 mmol/L    Chloride 112 (H) 98 - 107 mmol/L    Bicarbonate 22 21 - 32 mmol/L    Anion Gap 10 10 - 20 mmol/L    Urea Nitrogen 22 6 - 23 mg/dL    Creatinine 2.48 (H) 0.50 - 1.05 mg/dL    eGFR 21 (L) >60 mL/min/1.73m*2    Calcium 8.7 8.6 - 10.3 mg/dL    Albumin 2.6 (L) 3.4 - 5.0 g/dL    Alkaline Phosphatase 83 33 - 136 U/L    Total Protein 5.0 (L) 6.4 - 8.2 g/dL     (H) 9 - 39 U/L    Bilirubin, Total 0.5 0.0 - 1.2 mg/dL     (H) 7 - 45 U/L   Creatine Kinase   Result Value Ref Range    Creatine Kinase 2,350 (H) 0 - 215 U/L   POCT GLUCOSE   Result Value Ref Range    POCT Glucose 95 74 - 99 mg/dL   POCT GLUCOSE   Result Value Ref Range    POCT Glucose 215 (H) 74 - 99 mg/dL       Imaging Results  Ct chest:  IMPRESSION:  1. Emphysematous changes are present. Subtle density RIGHT upper lobe  appears to be present which appears to be cavitary. Follow-up PET scan  and/or tissue sampling recommended for further assessment.  2. Scattered micronodules are stable.  3. Nonobstructing left-sided nephrolithiasis.  4. Atherosclerosis of the thoracic aorta and coronary arteries is  present.    Cxr:  IMPRESSION:  No acute cardiopulmonary abnormality.    Medications:  aspirin, 81 mg, oral, Daily  budesonide, 0.5 mg, nebulization, BID  cefTRIAXone, 1 g, intravenous, q24h  cholecalciferol, 2,000 Units, oral, Daily  influenza, 0.7 mL, intramuscular, During hospitalization  formoterol, 20 mcg, nebulization, BID  glimepiride, 2 mg, oral, Daily with breakfast  heparin (porcine), 5,000 Units, subcutaneous, q8h SONIDO  insulin glargine, 15 Units, subcutaneous, q24h  insulin lispro, 0-10 Units, subcutaneous, q4h  lactulose, 20  g, oral, Daily  methylPREDNISolone sodium succinate (PF), 30 mg, intravenous, q8h  metoprolol tartrate, 50 mg, oral, BID  [Held by provider] rosuvastatin, 10 mg, oral, Nightly  sennosides-docusate sodium, 2 tablet, oral, BID       PRN medications: albuterol, dextrose, dextrose, glucagon, glucagon, ipratropium-albuteroL     Assessment/Plan   RUL cavitary lesion  - to be seen  pulmonary  -on iv steroids    2. UTI  -completed antibiotics    3. Hyperglycemia with DMII  -resume glimepiride, added lantus 15 units daily  -on SSI    4. HLD  -rosuvastatin    5. Elevated LFTs  -acute hep panel negative  -holding statin    6. AMARILYS on CKDIII  -at baseline around 1.5 currently 2.4 monitor closely continue ivfs renal on board    7. Non MI troponin elevation  -limited echo in am seen by cardiology    Pt/ot    DVT Prophylaxis:  Heparin subq    Renita Weaver MD  [facility]

## 2024-04-01 NOTE — CONSULTS
CONSULT: NEPHROLOGY SERVICE    REASON FOR CONSULT: AMARILYS  Admit Date: 3/29/2024  9:19 AM       HPI: Patient is a 68 y.o. female admitted 3/29/2024 with h/o CKD stage 3b, AMARILYS 2 months ago, type 2 diabetes, HTN, HLD, peripheral vascular disease, CAD with stent placement,  COPD, coming with generalized weakness, malaise and not eating for several days. Found to have early sepsis, UTI, clear CXR and CT. BNP 44, Had fluid resuscitation and ceftriaxone since admission   Feeling better, eating more   No contrast, occasional mild hypotension, no NSAID, no rash/fever, no cbc with diff done  UA with +rbc/wbc, no urine for culture collected     Past Medical History:   Diagnosis Date    Body mass index (BMI) 27.0-27.9, adult 01/18/2020    BMI 27.0-27.9,adult    Other conditions influencing health status     Coronary Artery Disease    Personal history of other diseases of the circulatory system     History of hypertension    Personal history of other diseases of the circulatory system     History of intermittent claudication    Personal history of other diseases of the circulatory system     History of peripheral vascular disease    Personal history of other endocrine, nutritional and metabolic disease     History of hyperlipidemia     Allergies: Penicillin, Metformin, and Sulfa (sulfonamide antibiotics)     Past Surgical History:   Procedure Laterality Date    OTHER SURGICAL HISTORY  08/22/2013    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 She has a 20.00 pack-year smoking history. She has never been exposed to tobacco smoke. She has  never used smokeless tobacco. She reports that she does not currently use alcohol. She reports that she does not currently use drugs after having used the following drugs: Marijuana.    Review of Systems  As above     CURRENT HOSP MEDS:    Current Facility-Administered Medications:     albuterol 2.5 mg /3 mL (0.083 %) nebulizer solution 2.5 mg, 2.5 mg, nebulization, q6h PRN, Karsten Yates, [place]    aspirin chewable tablet 81 mg, 81 mg, oral, Daily, REHANA Alcantar, 81 mg at 04/01/24 0906    budesonide (Pulmicort) 0.5 mg/2 mL nebulizer solution 0.5 mg, 0.5 mg, nebulization, BID, Karsten Yates PharmD, 0.5 mg at 04/01/24 1103    cefTRIAXone (Rocephin) IVPB 1 g, 1 g, intravenous, q24h, REHANA Alcantar, Stopped at 04/01/24 0940    cholecalciferol (Vitamin D-3) tablet 2,000 Units, 2,000 Units, oral, Daily, REHANA Alcantar, 2,000 Units at 04/01/24 0906    dextrose 50 % injection 12.5 g, 12.5 g, intravenous, q15 min PRN, REHANA Alcantar    dextrose 50 % injection 25 g, 25 g, intravenous, q15 min PRN, REHANA Alcantar    flu vaccine, quadrivalent, high-dose, preservative free, age 65y+ (FLUZONE), 0.7 mL, intramuscular, During hospitalization, Jarocho Vieyra MD    formoterol (Perforomist) 20 mcg/2 mL nebulizer solution 20 mcg, 20 mcg, nebulization, BID, Rosie LaytonD, 20 mcg at 04/01/24 1103    glimepiride (Amaryl) tablet 2 mg, 2 mg, oral, Daily with breakfast, Renita Weaver MD, 2 mg at 04/01/24 0906    glucagon (Glucagen) injection 1 mg, 1 mg, intramuscular, q15 min PRN, REHANA Alcantar    glucagon (Glucagen) injection 1 mg, 1 mg, intramuscular, q15 min PRN, REHANA Alcantar    heparin (porcine) injection 5,000 Units, 5,000 Units, subcutaneous, q8h SONIDO, REHANA Alcantar, 5,000 Units at 04/01/24 0633    insulin glargine (Lantus) injection 15 Units, 15 Units, subcutaneous, q24h, Renita Weaver MD, 15 Units at 03/31/24 2123    insulin lispro  "(HumaLOG) injection 0-10 Units, 0-10 Units, subcutaneous, q4h, REHANA Alcantar, 8 Units at 04/01/24 0055    ipratropium-albuteroL (Duo-Neb) 0.5-2.5 mg/3 mL nebulizer solution 3 mL, 3 mL, nebulization, 4x daily PRN, REHANA Alcantar, 3 mL at 03/30/24 2019    lactated Ringer's infusion, 125 mL/hr, intravenous, Continuous, Renita Weaver MD, Last Rate: 125 mL/hr at 04/01/24 1011, 125 mL/hr at 04/01/24 1011    lactulose 20 gram/30 mL oral solution 20 g, 20 g, oral, Daily, Renita Weaver MD, 20 g at 03/31/24 1142    methylPREDNISolone sod succinate (SOLU-Medrol) 40 mg/mL injection 30 mg, 30 mg, intravenous, q8h, Arnulfo Oropeza MD, 30 mg at 04/01/24 1110    metoprolol tartrate (Lopressor) tablet 50 mg, 50 mg, oral, BID, REHANA Alcantar, 50 mg at 03/31/24 2123    rosuvastatin (Crestor) tablet 10 mg, 10 mg, oral, Nightly, REHANA Alcantar, 10 mg at 03/31/24 2124    sennosides-docusate sodium (Candida-Colace) 8.6-50 mg per tablet 2 tablet, 2 tablet, oral, BID, Renita Weaver MD, 2 tablet at 03/31/24 2123     PHYSICAL EXAM:  /59 (Patient Position: Lying)   Pulse 59   Temp 36.7 °C (98 °F)   Resp 18   Ht 1.473 m (4' 10\")   Wt 56.2 kg (124 lb)   SpO2 96%   BMI 25.92 kg/m²     Intake/Output Summary (Last 24 hours) at 4/1/2024 1120  Last data filed at 4/1/2024 1011  Gross per 24 hour   Intake 2832.49 ml   Output --   Net 2832.49 ml     Gen: AAO, NAD  Neck: No JVD  Cardiac: RRR  Resp: clear BS  Abd: Soft, non tender, +BS, non distended   Ext: No edema   Neuro: moves 4 ext  Peripheral Pulses: Capillary refill <2secs, strong peripheral pulses.  Skin: no rash    LABS:   Results for orders placed or performed during the hospital encounter of 03/29/24 (from the past 24 hour(s))   POCT GLUCOSE   Result Value Ref Range    POCT Glucose 299 (H) 74 - 99 mg/dL   POCT GLUCOSE   Result Value Ref Range    POCT Glucose 213 (H) 74 - 99 mg/dL   POCT GLUCOSE   Result Value Ref Range    POCT Glucose 270 " (H) 74 - 99 mg/dL   POCT GLUCOSE   Result Value Ref Range    POCT Glucose 341 (H) 74 - 99 mg/dL   POCT GLUCOSE   Result Value Ref Range    POCT Glucose 333 (H) 74 - 99 mg/dL   POCT GLUCOSE   Result Value Ref Range    POCT Glucose 280 (H) 74 - 99 mg/dL   POCT GLUCOSE   Result Value Ref Range    POCT Glucose 138 (H) 74 - 99 mg/dL   CBC   Result Value Ref Range    WBC 26.2 (H) 4.4 - 11.3 x10*3/uL    nRBC 0.1 (H) 0.0 - 0.0 /100 WBCs    RBC 6.18 (H) 4.00 - 5.20 x10*6/uL    Hemoglobin 12.9 12.0 - 16.0 g/dL    Hematocrit 41.6 36.0 - 46.0 %    MCV 67 (L) 80 - 100 fL    MCH 20.9 (L) 26.0 - 34.0 pg    MCHC 31.0 (L) 32.0 - 36.0 g/dL    RDW 19.9 (H) 11.5 - 14.5 %    Platelets 107 (L) 150 - 450 x10*3/uL   Comprehensive metabolic panel   Result Value Ref Range    Glucose 116 (H) 74 - 99 mg/dL    Sodium 140 136 - 145 mmol/L    Potassium 3.8 3.5 - 5.3 mmol/L    Chloride 112 (H) 98 - 107 mmol/L    Bicarbonate 22 21 - 32 mmol/L    Anion Gap 10 10 - 20 mmol/L    Urea Nitrogen 22 6 - 23 mg/dL    Creatinine 2.48 (H) 0.50 - 1.05 mg/dL    eGFR 21 (L) >60 mL/min/1.73m*2    Calcium 8.7 8.6 - 10.3 mg/dL    Albumin 2.6 (L) 3.4 - 5.0 g/dL    Alkaline Phosphatase 83 33 - 136 U/L    Total Protein 5.0 (L) 6.4 - 8.2 g/dL     (H) 9 - 39 U/L    Bilirubin, Total 0.5 0.0 - 1.2 mg/dL     (H) 7 - 45 U/L   POCT GLUCOSE   Result Value Ref Range    POCT Glucose 95 74 - 99 mg/dL       DATA:   Diagnostic tests reviewed for today's visit:    Labs and meds    ASSESSMENT AND PLAN:  - AMARILYS on CKD stage 3b (baseline Scr ~1.5): likely establish ATN and might take some time to get better, stable Scr since admission  UA dirty but no culture taken, already on ATB  Euvolemic on ivf, eating better  Non oliguric  BP: controlled  Lytes and acid base: acceptable    Hb 12.9, plts mildly decrease but better    PLAN:  - reducing ivf and might be able to stop tomorrow  - kidney US, new UA, PCR, cbc with diff+CK      Greatly appreciate the opportunity to assist in  the care of this patient. Will continue to follow.     Signature: Phoenix Calero MD  Division of Nephrology and Hypertension

## 2024-04-01 NOTE — PROGRESS NOTES
04/01/24 1544   Discharge Planning   Living Arrangements Alone   Support Systems Family members   Assistance Needed PTA Independent with ADL's, no asst device   Type of Residence Private residence  (demo correct)   Home or Post Acute Services None   Patient expects to be discharged to: home-PT/OT pending   Does the patient need discharge transport arranged? No   Financial Resource Strain   How hard is it for you to pay for the very basics like food, housing, medical care, and heating? Not very   Housing Stability   In the last 12 months, was there a time when you were not able to pay the mortgage or rent on time? N   In the last 12 months, how many places have you lived? 1   In the last 12 months, was there a time when you did not have a steady place to sleep or slept in a shelter (including now)? N   Transportation Needs   In the past 12 months, has lack of transportation kept you from medical appointments or from getting medications? no  (PCP listed, last seen couple of months ago, drives self to appointment)   In the past 12 months, has lack of transportation kept you from meetings, work, or from getting things needed for daily living? No     Care Coordinator Note:  Met patient at bedside to discuss discharge planning, explained my role as care coordinator  Plan: Echo 4/2/ / Cardiology following , Nephrology consulted d/t AMARILYS, Pulmonary following -IV steroids    Mairvel LANE, RN T[facility]

## 2024-04-01 NOTE — PROGRESS NOTES
"Subjective Data:  No chest pain. Mild dyspnea only when walking prolonged distances. Weakness and appetite are improving.     Overnight Events:    none     Objective Data:  Last Recorded Vitals:  Vitals:    03/31/24 2035 04/01/24 0050 04/01/24 0424 04/01/24 0807   BP:  123/62 111/58 104/59   BP Location:  Right arm Right arm    Patient Position:  Lying Lying Lying   Pulse:  64 65 59   Resp:  18 18 18   Temp:  36.3 °C (97.3 °F) 36.6 °C (97.8 °F) 36.7 °C (98 °F)   TempSrc:  Temporal Temporal    SpO2: 96% 100% 99% 96%   Weight:       Height:           Last Labs:  LABS:  CMP:  Results from last 7 days   Lab Units 04/01/24  0617 03/31/24  0608 03/30/24  0704 03/29/24  1508 03/29/24  1111   SODIUM mmol/L 140 136 141  --  133*   POTASSIUM mmol/L 3.8 4.4 3.3* 2.7* 4.7   CHLORIDE mmol/L 112* 108* 112*  --  103   CO2 mmol/L 22 21 22  --  17*   ANION GAP mmol/L 10 11 10  --  18   BUN mg/dL 22 20 19  --  21   CREATININE mg/dL 2.48* 2.47* 2.50*  --  2.43*   EGFR mL/min/1.73m*2 21* 21* 20*  --  21*   MAGNESIUM mg/dL  --   --   --  2.10 2.00   ALBUMIN g/dL 2.6* 2.5* 2.6*  --  2.5*   ALT U/L 220* 237* 261*  --  255*   AST U/L 105* 140* 214*  --  192*   BILIRUBIN TOTAL mg/dL 0.5 0.6 0.6  --  0.8     CBC:  Results from last 7 days   Lab Units 04/01/24  0617 03/31/24  0608 03/30/24  0704 03/29/24  1221 03/29/24  1111   WBC AUTO x10*3/uL 26.2* 20.3* 11.9* 13.7* 11.4*   HEMOGLOBIN g/dL 12.9 12.1 12.2 13.2 11.1*   HEMATOCRIT % 41.6 39.1 39.5 41.8 35.7*   PLATELETS AUTO x10*3/uL 107* 97* 96* 81* 70*   MCV fL 67* 66* 67* 65* 66*     COAG:     ABO: No results found for: \"ABO\"  HEME/ENDO:  Results from last 7 days   Lab Units 03/30/24  0704   TSH mIU/L 3.91      CARDIAC:   Results from last 7 days   Lab Units 03/29/24  1111 03/29/24  1026   TROPHS ng/L 97* 114*   BNP pg/mL 44  --      Recent Labs     11/02/23  1552 11/22/22  1606 02/08/22  1502 02/03/21  1115   CHOL 114 124 149 152   LDLF  --  59 82 69   LDLCALC 50  --   --   --    HDL 43.8 " 51.1 49.7 54.5   TRIG 103 72 87 143          Last I/O:  I/O last 3 completed shifts:  In: 5480 (97.4 mL/kg) [P.O.:480; I.V.:5000 (88.9 mL/kg)]  Out: - (0 mL/kg)   Weight: 56.2 kg     Past Cardiology Tests (Last 3 Years):  EKG:  ECG 12 lead 03/29/2024      Electrocardiogram, 12-lead PRN ACS symptoms 01/26/2024      ECG 12 lead 01/15/2024    Echo:  Transthoracic Echo (TTE) Complete 01/27/2024  CONCLUSIONS:   1. Left ventricular systolic function is normal with a 65-70% estimated ejection fraction.   2. Spectral Doppler shows an impaired relaxation pattern of left ventricular diastolic filling.   3. Mild aortic valve stenosis.   4. Mild aortic valve regurgitation.  Ejection Fractions:  EF   Date/Time Value Ref Range Status   01/27/2024 02:00 PM 70 %      Cath:  No results found for this or any previous visit from the past 1095 days.    Stress Test:  No results found for this or any previous visit from the past 1095 days.    Cardiac Imaging:  No results found for this or any previous visit from the past 1095 days.      Inpatient Medications:  Scheduled medications   Medication Dose Route Frequency    aspirin  81 mg oral Daily    budesonide  0.5 mg nebulization BID    cefTRIAXone  1 g intravenous q24h    cholecalciferol  2,000 Units oral Daily    influenza  0.7 mL intramuscular During hospitalization    formoterol  20 mcg nebulization BID    glimepiride  2 mg oral Daily with breakfast    heparin (porcine)  5,000 Units subcutaneous q8h SONIDO    insulin glargine  15 Units subcutaneous q24h    insulin lispro  0-10 Units subcutaneous q4h    lactulose  20 g oral Daily    methylPREDNISolone sodium succinate (PF)  30 mg intravenous q8h    metoprolol tartrate  50 mg oral BID    rosuvastatin  10 mg oral Nightly    sennosides-docusate sodium  2 tablet oral BID     PRN medications   Medication    albuterol    dextrose    dextrose    glucagon    glucagon    ipratropium-albuteroL     Continuous Medications   Medication Dose Last Rate     lactated Ringer's  125 mL/hr 125 mL/hr (04/01/24 1011)       Physical Exam:  GENERAL: alert, cooperative, pleasant, in no acute distress  SKIN: warm and dry  NECK: Normal JVD, negative HJR  CARDIAC: Regular rate and rhythm with no rubs, murmurs, or gallops  CHEST: Normal respiratory efforts, lungs clear except scattered expiratory wheeze  ABDOMEN: soft, nontender, nondistended  EXTREMITIES: no edema, +2 palpable right RP pulse       Assessment/Plan   Elevated troponin  In the context of a known chronically elevated troponin (chronic non-traumatic myocardial injury) and AMARILYS. Stress cardiomyopathy r/o is not unreasonable given her history.  2. CAD with h/o MI s/p RCA PCI  Stable by available metrics.   3. Aortic stenosis/regurgitation  Mild. For outpatient monitoring.  4. AMARILYS- secondary to dehydration  5. Transaminitis-Suspect Rhabdomyolysis given AMARILYS, elevated liver enzymes, and CK level is elevated today -     Recommendations   TTE as above. No cardiac barriers to discharge if no untoward findings therein. Hold statin for now given her transaminitis and workup of her transaminitis. Check CK level as well.     F/U outpatient with our office post discharge.     Will review with Dr. Vasquez  Peripheral IV 03/30/24 22 G Left;Anterior Forearm (Active)   Site Assessment Clean;Dry;Intact 04/01/24 0845   Dressing Status Clean;Dry 04/01/24 0845   Number of days: 2       Code Status:  Full Code          GAEL Swanson-CNP

## 2024-04-01 NOTE — CARE PLAN
The patient's goals for the shift include  pt will remain comfortable throughout the shift    The clinical goals for the shift include pt will remain safe and below 200 for BG levels throughout the shift

## 2024-04-02 LAB
ALBUMIN SERPL BCP-MCNC: 2.5 G/DL (ref 3.4–5)
ALP SERPL-CCNC: 79 U/L (ref 33–136)
ALT SERPL W P-5'-P-CCNC: 194 U/L (ref 7–45)
ANION GAP SERPL CALC-SCNC: 10 MMOL/L (ref 10–20)
AST SERPL W P-5'-P-CCNC: 80 U/L (ref 9–39)
BILIRUB SERPL-MCNC: 0.4 MG/DL (ref 0–1.2)
BUN SERPL-MCNC: 25 MG/DL (ref 6–23)
CALCIUM SERPL-MCNC: 8.3 MG/DL (ref 8.6–10.3)
CHLORIDE SERPL-SCNC: 110 MMOL/L (ref 98–107)
CO2 SERPL-SCNC: 21 MMOL/L (ref 21–32)
CREAT SERPL-MCNC: 2.42 MG/DL (ref 0.5–1.05)
EGFRCR SERPLBLD CKD-EPI 2021: 21 ML/MIN/1.73M*2
GLUCOSE BLD MANUAL STRIP-MCNC: 136 MG/DL (ref 74–99)
GLUCOSE BLD MANUAL STRIP-MCNC: 217 MG/DL (ref 74–99)
GLUCOSE BLD MANUAL STRIP-MCNC: 222 MG/DL (ref 74–99)
GLUCOSE BLD MANUAL STRIP-MCNC: 287 MG/DL (ref 74–99)
GLUCOSE BLD MANUAL STRIP-MCNC: 342 MG/DL (ref 74–99)
GLUCOSE SERPL-MCNC: 189 MG/DL (ref 74–99)
HOLD SPECIMEN: NORMAL
POTASSIUM SERPL-SCNC: 3.8 MMOL/L (ref 3.5–5.3)
PROT SERPL-MCNC: 4.9 G/DL (ref 6.4–8.2)
SODIUM SERPL-SCNC: 137 MMOL/L (ref 136–145)

## 2024-04-02 PROCEDURE — 2500000002 HC RX 250 W HCPCS SELF ADMINISTERED DRUGS (ALT 637 FOR MEDICARE OP, ALT 636 FOR OP/ED): Performed by: PHARMACIST

## 2024-04-02 PROCEDURE — 99231 SBSQ HOSP IP/OBS SF/LOW 25: CPT | Performed by: INTERNAL MEDICINE

## 2024-04-02 PROCEDURE — 97161 PT EVAL LOW COMPLEX 20 MIN: CPT | Mod: GP

## 2024-04-02 PROCEDURE — 99233 SBSQ HOSP IP/OBS HIGH 50: CPT | Performed by: INTERNAL MEDICINE

## 2024-04-02 PROCEDURE — 80053 COMPREHEN METABOLIC PANEL: CPT | Performed by: INTERNAL MEDICINE

## 2024-04-02 PROCEDURE — 2500000002 HC RX 250 W HCPCS SELF ADMINISTERED DRUGS (ALT 637 FOR MEDICARE OP, ALT 636 FOR OP/ED): Performed by: NURSE PRACTITIONER

## 2024-04-02 PROCEDURE — 2500000001 HC RX 250 WO HCPCS SELF ADMINISTERED DRUGS (ALT 637 FOR MEDICARE OP): Performed by: NURSE PRACTITIONER

## 2024-04-02 PROCEDURE — 86036 ANCA SCREEN EACH ANTIBODY: CPT | Performed by: INTERNAL MEDICINE

## 2024-04-02 PROCEDURE — 86334 IMMUNOFIX E-PHORESIS SERUM: CPT | Mod: AHULAB | Performed by: INTERNAL MEDICINE

## 2024-04-02 PROCEDURE — 1100000001 HC PRIVATE ROOM DAILY

## 2024-04-02 PROCEDURE — 86320 SERUM IMMUNOELECTROPHORESIS: CPT | Performed by: INTERNAL MEDICINE

## 2024-04-02 PROCEDURE — 36415 COLL VENOUS BLD VENIPUNCTURE: CPT | Performed by: INTERNAL MEDICINE

## 2024-04-02 PROCEDURE — 97165 OT EVAL LOW COMPLEX 30 MIN: CPT | Mod: GO

## 2024-04-02 PROCEDURE — 2500000001 HC RX 250 WO HCPCS SELF ADMINISTERED DRUGS (ALT 637 FOR MEDICARE OP): Performed by: INTERNAL MEDICINE

## 2024-04-02 PROCEDURE — 82947 ASSAY GLUCOSE BLOOD QUANT: CPT

## 2024-04-02 PROCEDURE — 2500000004 HC RX 250 GENERAL PHARMACY W/ HCPCS (ALT 636 FOR OP/ED): Performed by: INTERNAL MEDICINE

## 2024-04-02 PROCEDURE — 84155 ASSAY OF PROTEIN SERUM: CPT | Mod: AHULAB | Performed by: INTERNAL MEDICINE

## 2024-04-02 PROCEDURE — 2500000002 HC RX 250 W HCPCS SELF ADMINISTERED DRUGS (ALT 637 FOR MEDICARE OP, ALT 636 FOR OP/ED): Performed by: INTERNAL MEDICINE

## 2024-04-02 PROCEDURE — 83521 IG LIGHT CHAINS FREE EACH: CPT | Mod: AHULAB | Performed by: INTERNAL MEDICINE

## 2024-04-02 PROCEDURE — 86038 ANTINUCLEAR ANTIBODIES: CPT | Mod: AHULAB | Performed by: INTERNAL MEDICINE

## 2024-04-02 PROCEDURE — 94640 AIRWAY INHALATION TREATMENT: CPT | Mod: MUE

## 2024-04-02 PROCEDURE — 2500000004 HC RX 250 GENERAL PHARMACY W/ HCPCS (ALT 636 FOR OP/ED): Performed by: NURSE PRACTITIONER

## 2024-04-02 PROCEDURE — 84165 PROTEIN E-PHORESIS SERUM: CPT | Performed by: INTERNAL MEDICINE

## 2024-04-02 RX ORDER — INSULIN GLARGINE 100 [IU]/ML
24 INJECTION, SOLUTION SUBCUTANEOUS EVERY 24 HOURS
Status: DISCONTINUED | OUTPATIENT
Start: 2024-04-02 | End: 2024-04-03

## 2024-04-02 RX ORDER — INSULIN LISPRO 100 [IU]/ML
0-10 INJECTION, SOLUTION INTRAVENOUS; SUBCUTANEOUS
Status: DISCONTINUED | OUTPATIENT
Start: 2024-04-02 | End: 2024-04-04 | Stop reason: HOSPADM

## 2024-04-02 RX ADMIN — HEPARIN SODIUM 5000 UNITS: 5000 INJECTION INTRAVENOUS; SUBCUTANEOUS at 14:26

## 2024-04-02 RX ADMIN — GLIMEPIRIDE 2 MG: 2 TABLET ORAL at 09:11

## 2024-04-02 RX ADMIN — FORMOTEROL FUMARATE DIHYDRATE 20 MCG: 20 SOLUTION RESPIRATORY (INHALATION) at 08:15

## 2024-04-02 RX ADMIN — Medication 2000 UNITS: at 09:11

## 2024-04-02 RX ADMIN — INSULIN LISPRO 4 UNITS: 100 INJECTION, SOLUTION INTRAVENOUS; SUBCUTANEOUS at 04:04

## 2024-04-02 RX ADMIN — BUDESONIDE INHALATION 0.5 MG: 0.5 SUSPENSION RESPIRATORY (INHALATION) at 08:15

## 2024-04-02 RX ADMIN — METOPROLOL TARTRATE 50 MG: 50 TABLET, FILM COATED ORAL at 09:11

## 2024-04-02 RX ADMIN — METOPROLOL TARTRATE 50 MG: 50 TABLET, FILM COATED ORAL at 21:21

## 2024-04-02 RX ADMIN — HEPARIN SODIUM 5000 UNITS: 5000 INJECTION INTRAVENOUS; SUBCUTANEOUS at 05:46

## 2024-04-02 RX ADMIN — BUDESONIDE INHALATION 0.5 MG: 0.5 SUSPENSION RESPIRATORY (INHALATION) at 21:03

## 2024-04-02 RX ADMIN — INSULIN LISPRO 6 UNITS: 100 INJECTION, SOLUTION INTRAVENOUS; SUBCUTANEOUS at 16:35

## 2024-04-02 RX ADMIN — HEPARIN SODIUM 5000 UNITS: 5000 INJECTION INTRAVENOUS; SUBCUTANEOUS at 21:21

## 2024-04-02 RX ADMIN — INSULIN GLARGINE 24 UNITS: 100 INJECTION, SOLUTION SUBCUTANEOUS at 21:21

## 2024-04-02 RX ADMIN — METHYLPREDNISOLONE SODIUM SUCCINATE 30 MG: 40 INJECTION, POWDER, FOR SOLUTION INTRAMUSCULAR; INTRAVENOUS at 11:34

## 2024-04-02 RX ADMIN — METHYLPREDNISOLONE SODIUM SUCCINATE 30 MG: 40 INJECTION, POWDER, FOR SOLUTION INTRAMUSCULAR; INTRAVENOUS at 04:04

## 2024-04-02 RX ADMIN — ASPIRIN 81 MG 81 MG: 81 TABLET ORAL at 09:11

## 2024-04-02 RX ADMIN — FORMOTEROL FUMARATE DIHYDRATE 20 MCG: 20 SOLUTION RESPIRATORY (INHALATION) at 21:03

## 2024-04-02 RX ADMIN — INSULIN LISPRO 4 UNITS: 100 INJECTION, SOLUTION INTRAVENOUS; SUBCUTANEOUS at 12:32

## 2024-04-02 RX ADMIN — SODIUM CHLORIDE, POTASSIUM CHLORIDE, SODIUM LACTATE AND CALCIUM CHLORIDE 50 ML/HR: 600; 310; 30; 20 INJECTION, SOLUTION INTRAVENOUS at 11:34

## 2024-04-02 ASSESSMENT — COGNITIVE AND FUNCTIONAL STATUS - GENERAL
DRESSING REGULAR LOWER BODY CLOTHING: A LITTLE
MOVING TO AND FROM BED TO CHAIR: A LITTLE
DAILY ACTIVITIY SCORE: 20
DRESSING REGULAR UPPER BODY CLOTHING: A LITTLE
TOILETING: A LITTLE
CLIMB 3 TO 5 STEPS WITH RAILING: A LITTLE
WALKING IN HOSPITAL ROOM: A LITTLE
HELP NEEDED FOR BATHING: A LITTLE
DAILY ACTIVITIY SCORE: 20
HELP NEEDED FOR BATHING: A LITTLE
MOVING TO AND FROM BED TO CHAIR: A LITTLE
WALKING IN HOSPITAL ROOM: A LITTLE
CLIMB 3 TO 5 STEPS WITH RAILING: A LITTLE
MOBILITY SCORE: 20
DRESSING REGULAR UPPER BODY CLOTHING: A LITTLE
STANDING UP FROM CHAIR USING ARMS: A LITTLE
TOILETING: A LITTLE
DRESSING REGULAR LOWER BODY CLOTHING: A LITTLE
STANDING UP FROM CHAIR USING ARMS: A LITTLE
MOBILITY SCORE: 20

## 2024-04-02 ASSESSMENT — PAIN SCALES - GENERAL
PAINLEVEL_OUTOF10: 0 - NO PAIN
PAINLEVEL_OUTOF10: 0 - NO PAIN

## 2024-04-02 ASSESSMENT — ACTIVITIES OF DAILY LIVING (ADL)
ADL_ASSISTANCE: INDEPENDENT
ADL_ASSISTANCE: INDEPENDENT

## 2024-04-02 ASSESSMENT — PAIN - FUNCTIONAL ASSESSMENT
PAIN_FUNCTIONAL_ASSESSMENT: 0-10
PAIN_FUNCTIONAL_ASSESSMENT: 0-10

## 2024-04-02 NOTE — PROGRESS NOTES
Occupational Therapy    Evaluation    Patient Name: Yesy Ayala  MRN: [id number]  Today's Date: 4/2/2024  Time Calculation  Start Time: 0955  Stop Time: 1006  Time Calculation (min): 11 min      Assessment:  OT Assessment: Pt presents below baseline in fxnl mob, fxnl transfers and fxnl activity tolerance. Pt would benefit from low intensity OT to maximize safety with ADLs and fxnl mob and improve activity tolerance.  Prognosis: Good  Barriers to Discharge: Inaccessible home environment  Evaluation/Treatment Tolerance: Patient limited by fatigue  Medical Staff Made Aware: Yes  End of Session Communication: Bedside nurse  End of Session Patient Position: Bed, 3 rail up, Alarm on  OT Assessment Results: Decreased ADL status, Decreased safe judgment during ADL, Decreased endurance  Prognosis: Good  Barriers to Discharge: Inaccessible home environment  Evaluation/Treatment Tolerance: Patient limited by fatigue  Medical Staff Made Aware: Yes  Strengths: Ability to acquire knowledge, Capable of completing ADLs semi/independent, Physical health, Living arrangement secure  Barriers to Participation: Housing layout  Plan:  Treatment Interventions: ADL retraining, Functional transfer training, Patient/family training, Neuromuscular reeducation  OT Frequency: 2 times per week  OT Discharge Recommendations: Low intensity level of continued care  Equipment Recommended upon Discharge: Wheeled walker  OT Recommended Transfer Status: Stand by assist  OT - OK to Discharge: Yes  Treatment Interventions: ADL retraining, Functional transfer training, Patient/family training, Neuromuscular reeducation    Subjective   Current Problem:  1. Shortness of breath  Transthoracic Echo (TTE) Limited    Transthoracic Echo (TTE) Limited      2. Elevated troponin  Transthoracic Echo (TTE) Limited    Transthoracic Echo (TTE) Limited      3. Blood glucose elevated        4. History of diabetes mellitus        5. Acute kidney injury (CMS/HCC)        6.  Cystitis        7. Lesion of lung        8. Renal stone          General:  General  Reason for Referral: pt is a 67 y/o F who presented to ED with decline in function. Found to have UTI, sepsis and AMARILYS.  Referred By: Dr. Weaver  Past Medical History Relevant to Rehab: ASHD, COPD, osteoporosis, HTN, PVD, RSV, CKD 3a, elevated troponin  Family/Caregiver Present: No  Co-Treatment: PT  Co-Treatment Reason:  (for maximum patient safety and outcomes)  Patient Position Received:  (EOB with PT)  General Comment: agreeable to OT, had been up in bathroom completing ADL during PT arrival  Precautions:  Medical Precautions: Fall precautions  Vital Signs:  SpO2: 97 %  Pain:  Pain Assessment  Pain Assessment: 0-10  Pain Score: 0 - No pain    Objective   Cognition:  Orientation Level: Oriented X4  Safety/Judgement: Exceptions to WFL  Impulsive: Moderately     Home Living:  Type of Home: Apartment  Lives With: Alone  Home Adaptive Equipment: None  Home Layout:  (2nd level apartment with 13 steps, no elevator, no hand rail)  Bathroom Shower/Tub: Tub/shower unit  Bathroom Toilet: Standard  Bathroom Equipment: None  Prior Function:  Level of [place]: Independent with ADLs and functional transfers, Independent with homemaking with ambulation  ADL Assistance: Independent  Homemaking Assistance: Independent  Ambulatory Assistance: Independent  IADL History:  Current License: Yes    Activity Tolerance:  Endurance: Tolerates less than 10 min exercise, no significant change in vital signs  Bed Mobility/Transfers: Bed Mobility  Bed Mobility: Yes  Bed Mobility 1  Bed Mobility 1: Sitting to supine  Level of Assistance 1: Modified independent  Bed Mobility Comments 1: impulsive    Transfers  Transfer: Yes  Transfer 1  Transfer From 1: Sit to  Transfer to 1: Stand  Technique 1: Stand to sit  Transfer Device 1:  (no device)  Transfer Level of Assistance 1: Close supervision    Functional Mobility:  Functional Mobility  Functional Mobility  Performed: Yes  Functional Mobility 1  Surface 1: Level tile  Device 1: No device  Assistance 1: Contact guard  Comments 1:  (CGA, intermittent use of hand rail with RUE due to unsteadiness during mobility)    Standing Balance:  Dynamic Standing Balance  Dynamic Standing-Balance Support: No upper extremity supported  Dynamic Standing-Balance: Turning  Dynamic Standing-Comments: CGA, intermittent use of hand rail with RUE due to unsteadiness during mobility     Vision:Vision - Basic Assessment  Current Vision: No visual deficits    Outcome Measures:[facility] Daily Activity  Putting on and taking off regular lower body clothing: A little  Bathing (including washing, rinsing, drying): A little  Putting on and taking off regular upper body clothing: A little  Toileting, which includes using toilet, bedpan or urinal: A little  Taking care of personal grooming such as brushing teeth: None  Eating Meals: None  Daily Activity - Total Score: 20        Education Documentation  Body Mechanics, taught by Ashlee Ruiz OT at 4/2/2024 11:56 AM.  Learner: Patient  Readiness: Acceptance  Method: Explanation  Response: Verbalizes Understanding, Demonstrated Understanding    Precautions, taught by Ashlee Ruiz OT at 4/2/2024 11:56 AM.  Learner: Patient  Readiness: Acceptance  Method: Explanation  Response: Verbalizes Understanding, Demonstrated Understanding    ADL Training, taught by Ashlee Ruiz OT at 4/2/2024 11:56 AM.  Learner: Patient  Readiness: Acceptance  Method: Explanation  Response: Verbalizes Understanding, Demonstrated Understanding    Education Comments  No comments found.        OP EDUCATION:       Goals:  Encounter Problems       Encounter Problems (Active)       ADLs       Patient with complete lower body dressing with modified independent level of assistance donning and doffing all LE clothes  with PRN adaptive equipment while edge of bed        Start:  04/02/24    Expected End:  04/16/24            Patient will  complete toileting including hygiene clothing management/hygiene with modified independent level of assistance and grab bars.       Start:  04/02/24    Expected End:  04/16/24               MOBILITY       Patient will perform Functional mobility max Household distances/Community Distances with modified independent level of assistance and least restrictive device in order to improve safety and functional mobility.       Start:  04/02/24    Expected End:  04/16/24               TRANSFERS       Patient will complete functional transfer to all surfaces with least restrictive device with modified independent level of assistance.       Start:  04/02/24    Expected End:  04/16/24

## 2024-04-02 NOTE — PROGRESS NOTES
Music Therapy Note      Therapy Session  Referral Type: New referral this admission  Visit Type: New visit  Session Start Time: 1137  Session End Time: 1140  Intervention Delivery: In-person  Conflict of Service: None               Treatment/Interventions  Music Therapy Interventions: Assessment    Post-assessment  Total Session Time (min): 3 minutes    Narrative  Assessment Detail: Met with pt at bedside as part of interdisciplinary rounds with assistant nurse manager. MT introduced and offered music therapy support this admission.  Intervention: Pt declined services at this time.  Follow-up: Pt was informed on how to request music therapy follow-up through their care team should they need the additional support prior to discharge. Pt agreeable; will sign off at this time unless reconsulted.    Education Documentation  Coping Strategies, taught by ISABELLE Manuel at 4/2/2024 12:28 PM.  Learner: Patient  Readiness: Acceptance  Method: Explanation  Response: Verbalizes Understanding

## 2024-04-02 NOTE — PROGRESS NOTES
"Yesy Ayala is a 68 y.o. female on day 4 of admission presenting with Shortness of breath.    Subjective   Matias CBC tomorrow, creatinine stable at 2.4, states she is having ongoing dyspnea on exertion.  Aware that pulmonology is on board will discuss with them in regards to what the further steps are she needs a PET/CT as an outpatient for the right upper lobe cavitary lesion.  Will start weaning down steroids.       Objective     Physical Exam  Constitutional:       Appearance: Normal appearance.   HENT:      Head: Normocephalic.      Nose: Nose normal.      Mouth/Throat:      Mouth: Mucous membranes are dry.      Pharynx: Oropharynx is clear.   Cardiovascular:      Rate and Rhythm: Normal rate and regular rhythm.   Pulmonary:      Effort: Pulmonary effort is normal.   Abdominal:      General: Abdomen is flat. Bowel sounds are normal.      Palpations: Abdomen is soft.   Skin:     General: Skin is warm.      Capillary Refill: Capillary refill takes less than 2 seconds.   Neurological:      General: No focal deficit present.      Mental Status: She is alert.         Last Recorded Vitals  Blood pressure 110/70, pulse 63, temperature 36.7 °C (98 °F), resp. rate 18, height 1.473 m (4' 10\"), weight 56.2 kg (124 lb), SpO2 97 %.  Intake/Output last 3 Shifts:  I/O last 3 completed shifts:  In: 3438.7 (61.1 mL/kg) [P.O.:480; I.V.:2808.7 (49.9 mL/kg); IV Piggyback:150]  Out: - (0 mL/kg)   Weight: 56.2 kg     Relevant Results  Results for orders placed or performed during the hospital encounter of 03/29/24 (from the past 24 hour(s))   Transthoracic Echo (TTE) Limited   Result Value Ref Range    AV pk nawaf 1.65 m/s    AV mn grad 6.0 mmHg    LV Biplane EF 57 %    MV avg E/e' ratio 10.03     MV E/A ratio 0.60     Tricuspid annular plane systolic excursion 1.4 cm    LVIDd 4.80 cm    RVSP 22.4 mmHg    AV pk grad 10.9 mmHg    LV A4C EF 55.7    POCT GLUCOSE   Result Value Ref Range    POCT Glucose 283 (H) 74 - 99 mg/dL   POCT GLUCOSE "   Result Value Ref Range    POCT Glucose 312 (H) 74 - 99 mg/dL   POCT GLUCOSE   Result Value Ref Range    POCT Glucose 273 (H) 74 - 99 mg/dL   POCT GLUCOSE   Result Value Ref Range    POCT Glucose 217 (H) 74 - 99 mg/dL   Comprehensive metabolic panel   Result Value Ref Range    Glucose 189 (H) 74 - 99 mg/dL    Sodium 137 136 - 145 mmol/L    Potassium 3.8 3.5 - 5.3 mmol/L    Chloride 110 (H) 98 - 107 mmol/L    Bicarbonate 21 21 - 32 mmol/L    Anion Gap 10 10 - 20 mmol/L    Urea Nitrogen 25 (H) 6 - 23 mg/dL    Creatinine 2.42 (H) 0.50 - 1.05 mg/dL    eGFR 21 (L) >60 mL/min/1.73m*2    Calcium 8.3 (L) 8.6 - 10.3 mg/dL    Albumin 2.5 (L) 3.4 - 5.0 g/dL    Alkaline Phosphatase 79 33 - 136 U/L    Total Protein 4.9 (L) 6.4 - 8.2 g/dL    AST 80 (H) 9 - 39 U/L    Bilirubin, Total 0.4 0.0 - 1.2 mg/dL     (H) 7 - 45 U/L   Lavender Top   Result Value Ref Range    Extra Tube Hold for add-ons.    POCT GLUCOSE   Result Value Ref Range    POCT Glucose 136 (H) 74 - 99 mg/dL   POCT GLUCOSE   Result Value Ref Range    POCT Glucose 222 (H) 74 - 99 mg/dL       Imaging Results  Ct chest:  IMPRESSION:  1. Emphysematous changes are present. Subtle density RIGHT upper lobe  appears to be present which appears to be cavitary. Follow-up PET scan  and/or tissue sampling recommended for further assessment.  2. Scattered micronodules are stable.  3. Nonobstructing left-sided nephrolithiasis.  4. Atherosclerosis of the thoracic aorta and coronary arteries is  present.    Cxr:  IMPRESSION:  No acute cardiopulmonary abnormality.    Medications:  aspirin, 81 mg, oral, Daily  budesonide, 0.5 mg, nebulization, BID  cholecalciferol, 2,000 Units, oral, Daily  influenza, 0.7 mL, intramuscular, During hospitalization  formoterol, 20 mcg, nebulization, BID  glimepiride, 2 mg, oral, Daily with breakfast  heparin (porcine), 5,000 Units, subcutaneous, q8h SONIDO  insulin glargine, 24 Units, subcutaneous, q24h  insulin lispro, 0-10 Units, subcutaneous, TID  with meals  lactulose, 20 g, oral, Daily  methylPREDNISolone sodium succinate (PF), 30 mg, intravenous, q8h  metoprolol tartrate, 50 mg, oral, BID  [Held by provider] rosuvastatin, 10 mg, oral, Nightly       PRN medications: albuterol, dextrose, dextrose, glucagon, ipratropium-albuteroL     Assessment/Plan   RUL cavitary lesion  -needs pet ct as OP  -decrease iv steriods will discuss with pulm if patient can be dc in am    2. UTI  -completed antibiotics    3. Hyperglycemia with DMII  -glimepiride, added lantus 15 units daily  -on SSI    4. HLD  -rosuvastatin    5. Elevated LFTs  -acute hep panel negative  -holding statin  -lfts trending down    6. AMARILYS on CKDIII  -at baseline around 1.4 currently 2.4 monitor closely continue ivfs renal on board  -ck slightly elevated    7. Non MI troponin elevation  -limited echo normal ef seen by cardiology      DVT Prophylaxis:  Heparin subq    Renita Weaver MD  [facility]

## 2024-04-02 NOTE — PROGRESS NOTES
Nephrology Consult Progress Note    Admit Date: 3/29/2024    Interval history:  Trying to eat more    CURRENT MEDICATIONS:    Current Facility-Administered Medications:     albuterol 2.5 mg /3 mL (0.083 %) nebulizer solution 2.5 mg, 2.5 mg, nebulization, q6h PRN, Karsten Yates, Bonifacio    aspirin chewable tablet 81 mg, 81 mg, oral, Daily, REHANA Alcantar, 81 mg at 04/02/24 0911    budesonide (Pulmicort) 0.5 mg/2 mL nebulizer solution 0.5 mg, 0.5 mg, nebulization, BID, Karsten Yates PharmD, 0.5 mg at 04/02/24 0815    cholecalciferol (Vitamin D-3) tablet 2,000 Units, 2,000 Units, oral, Daily, REHANA Alcantar, 2,000 Units at 04/02/24 0911    dextrose 50 % injection 12.5 g, 12.5 g, intravenous, q15 min PRN, REHANA Alcantar    dextrose 50 % injection 25 g, 25 g, intravenous, q15 min PRN, REHANA Alcantar    flu vaccine, quadrivalent, high-dose, preservative free, age 65y+ (FLUZONE), 0.7 mL, intramuscular, During hospitalization, Jarocho Vieyra MD    formoterol (Perforomist) 20 mcg/2 mL nebulizer solution 20 mcg, 20 mcg, nebulization, BID, Karsten Yates PharmD, 20 mcg at 04/02/24 0815    glimepiride (Amaryl) tablet 2 mg, 2 mg, oral, Daily with breakfast, Renita Weaver MD, 2 mg at 04/02/24 0911    glucagon (Glucagen) injection 1 mg, 1 mg, intramuscular, q15 min PRN, REHANA Alcantar    heparin (porcine) injection 5,000 Units, 5,000 Units, subcutaneous, q8h SONIDO, REHANA Alcantar, 5,000 Units at 04/02/24 0546    insulin glargine (Lantus) injection 24 Units, 24 Units, subcutaneous, q24h, Juan Manuel العلي MD    insulin lispro (HumaLOG) injection 0-10 Units, 0-10 Units, subcutaneous, TID with meals, Juan Manuel العلي MD    ipratropium-albuteroL (Duo-Neb) 0.5-2.5 mg/3 mL nebulizer solution 3 mL, 3 mL, nebulization, 4x daily PRN, Carter Rincon, APRN-CNP, 3 mL at 03/30/24 2019    lactated Ringer's infusion, 50 mL/hr, intravenous, Continuous, Phoenix Calero MD, Last Rate: 50  "mL/hr at 04/01/24 1523, 50 mL/hr at 04/01/24 1523    lactulose 20 gram/30 mL oral solution 20 g, 20 g, oral, Daily, Renita Weaver MD, 20 g at 03/31/24 1142    methylPREDNISolone sod succinate (SOLU-Medrol) 40 mg/mL injection 30 mg, 30 mg, intravenous, q8h, Arnulfo Oropeza MD, 30 mg at 04/02/24 0404    metoprolol tartrate (Lopressor) tablet 50 mg, 50 mg, oral, BID, REHANA Alcantar, 50 mg at 04/02/24 0911    [Held by provider] rosuvastatin (Crestor) tablet 10 mg, 10 mg, oral, Nightly, REHANA Alcantar, 10 mg at 03/31/24 2124       Intake/Output Summary (Last 24 hours) at 4/2/2024 1131  Last data filed at 4/1/2024 2000  Gross per 24 hour   Intake 606.25 ml   Output --   Net 606.25 ml       PHYSICAL EXAM:  /70 (Patient Position: Lying)   Pulse 63   Temp 36.7 °C (98 °F)   Resp 18   Ht 1.473 m (4' 10\")   Wt 56.2 kg (124 lb)   SpO2 97%   BMI 25.92 kg/m²     Intake/Output Summary (Last 24 hours) at 4/2/2024 1131  Last data filed at 4/1/2024 2000  Gross per 24 hour   Intake 606.25 ml   Output --   Net 606.25 ml     Gen: AAO, NAD  Neck: No JVD  Cardiac: RRR  Resp: clear BS  Abd: Soft, non tender, +BS, non distended   Ext: No edema   Neuro: moves 4 ext  Peripheral Pulses: Capillary refill <2secs, strong peripheral pulses.  Skin: no rash    Labs:  Results for orders placed or performed during the hospital encounter of 03/29/24 (from the past 24 hour(s))   POCT GLUCOSE   Result Value Ref Range    POCT Glucose 215 (H) 74 - 99 mg/dL   Protein, Urine Random   Result Value Ref Range    Total Protein, Urine Random 144 (H) 5 - 24 mg/dL    Creatinine, Urine Random 82.9 20.0 - 320.0 mg/dL    T. Protein/Creatinine Ratio 1.74 (H) 0.00 - 0.17 mg/mg Creat   Urinalysis with Reflex Microscopic   Result Value Ref Range    Color, Urine Colorless (N) Light-Yellow, Yellow, Dark-Yellow    Appearance, Urine Turbid (N) Clear    Specific Gravity, Urine 1.010 1.005 - 1.035    pH, Urine 5.5 5.0, 5.5, 6.0, 6.5, 7.0, 7.5, " 8.0    Protein, Urine 70 (1+) (A) NEGATIVE, 10 (TRACE), 20 (TRACE) mg/dL    Glucose, Urine Normal Normal mg/dL    Blood, Urine OVER (3+) (A) NEGATIVE    Ketones, Urine NEGATIVE NEGATIVE mg/dL    Bilirubin, Urine NEGATIVE NEGATIVE    Urobilinogen, Urine Normal Normal mg/dL    Nitrite, Urine NEGATIVE NEGATIVE    Leukocyte Esterase, Urine NEGATIVE NEGATIVE   Microscopic Only, Urine   Result Value Ref Range    WBC, Urine 1-5 1-5, NONE /HPF    RBC, Urine 1-2 NONE, 1-2, 3-5 /HPF    Mucus, Urine FEW Reference range not established. /LPF   Transthoracic Echo (TTE) Limited   Result Value Ref Range    AV pk nawaf 1.65 m/s    AV mn grad 6.0 mmHg    LV Biplane EF 57 %    MV avg E/e' ratio 10.03     MV E/A ratio 0.60     Tricuspid annular plane systolic excursion 1.4 cm    LVIDd 4.80 cm    RVSP 22.4 mmHg    AV pk grad 10.9 mmHg    LV A4C EF 55.7    POCT GLUCOSE   Result Value Ref Range    POCT Glucose 283 (H) 74 - 99 mg/dL   POCT GLUCOSE   Result Value Ref Range    POCT Glucose 312 (H) 74 - 99 mg/dL   POCT GLUCOSE   Result Value Ref Range    POCT Glucose 273 (H) 74 - 99 mg/dL   POCT GLUCOSE   Result Value Ref Range    POCT Glucose 217 (H) 74 - 99 mg/dL   Comprehensive metabolic panel   Result Value Ref Range    Glucose 189 (H) 74 - 99 mg/dL    Sodium 137 136 - 145 mmol/L    Potassium 3.8 3.5 - 5.3 mmol/L    Chloride 110 (H) 98 - 107 mmol/L    Bicarbonate 21 21 - 32 mmol/L    Anion Gap 10 10 - 20 mmol/L    Urea Nitrogen 25 (H) 6 - 23 mg/dL    Creatinine 2.42 (H) 0.50 - 1.05 mg/dL    eGFR 21 (L) >60 mL/min/1.73m*2    Calcium 8.3 (L) 8.6 - 10.3 mg/dL    Albumin 2.5 (L) 3.4 - 5.0 g/dL    Alkaline Phosphatase 79 33 - 136 U/L    Total Protein 4.9 (L) 6.4 - 8.2 g/dL    AST 80 (H) 9 - 39 U/L    Bilirubin, Total 0.4 0.0 - 1.2 mg/dL     (H) 7 - 45 U/L   Lavender Top   Result Value Ref Range    Extra Tube Hold for add-ons.    POCT GLUCOSE   Result Value Ref Range    POCT Glucose 136 (H) 74 - 99 mg/dL        DATA:   Diagnostic tests  reviewed for today's visit:    New labs and imaging     Assessment and Plan:  Pt with h/o CKD stage 3b, AMARILYS 2 months ago, type 2 diabetes, HTN, HLD, peripheral vascular disease, CAD with stent placement,  COPD, coming with generalized weakness, malaise and not eating for several days. Found to have early sepsis, UTI  Found to have RUL cavitary lesion  - AMARILYS on CKD stage 3b (baseline Scr ~1.5): likely establish ATN and might take some time to get better, Scr remains stable since admission  Repeat UA is bland now, no cells, non nephrotic proteinuria PCR 1.7, no eosinophilia   CK 2300, higher at admission??  US with L kidney cyst, mildly complex, will need Follow up, no hydro  Euvolemic on ivf, eating better  Non oliguric  BP: controlled  Lytes and acid base: acceptable    Hb 12.9, plts mildly decrease but better     PLAN:  -Keeping low rate ivf another day, repeat labs with CK in am, also getting SPEP, K/L ratio, WESLEY and ANCA     Will continue to follow.     Signature: Phoenix Calero MD

## 2024-04-02 NOTE — PROGRESS NOTES
Care Coordinator Note:    Plan: Patient in with SOB, elevated troponin. Echo done. Neph consulted for AMARILYS. Iv steroids- elevated blood sugars- started on lantus at HS. Pulm following- waiting on clearance.     Status: Inpatient  Payor: [place] [employer]  Disposition: NEW [facility] PT OT. Home alone  Barrier: pulm clearance  ADOD:1-2 days    Rasheeda Berger TCC      04/02/24 6048   Discharge Planning   Patient expects to be discharged to: Home alone and [facility].   Patient Choice   Provider Choice list and CMS website (https://medicare.gov/care-compare#search) for post-acute Quality and Resource Measure Data were provided and reviewed with: Patient   Patient / Family choosing to utilize agency / facility established prior to hospitalization Yes

## 2024-04-02 NOTE — PROGRESS NOTES
"Yesy Ayala is a 68 y.o. female on day 4 of admission presenting with Shortness of breath.    Subjective   Patient feeling better today, more rested      Lispro 14 units given overnight    Objective   Physical Exam  Constitutional:       General: She is not in acute distress.  Neurological:      Mental Status: She is alert.         Last Recorded Vitals  Blood pressure 110/70, pulse 63, temperature 36.7 °C (98 °F), resp. rate 18, height 1.473 m (4' 10\"), weight 56.2 kg (124 lb), SpO2 98 %.  Intake/Output last 3 Shifts:  I/O last 3 completed shifts:  In: 3438.7 (61.1 mL/kg) [P.O.:480; I.V.:2808.7 (49.9 mL/kg); IV Piggyback:150]  Out: - (0 mL/kg)   Weight: 56.2 kg     Relevant Results  Results from last 7 days   Lab Units 04/02/24  0752 04/02/24  0534 04/02/24  0345 04/01/24  2342 04/01/24  2217 04/01/24  1740 04/01/24  0810 04/01/24  0617 03/31/24  1157 03/31/24  0608 03/30/24  0819 03/30/24  0704 03/29/24  1723 03/29/24  1111   POCT GLUCOSE mg/dL 136*  --  217* 273* 312* 283*   < >  --    < >  --    < >  --    < >  --    GLUCOSE mg/dL  --  189*  --   --   --   --   --  116*  --  326*  --  121*  --  493*    < > = values in this interval not displayed.         Assessment/Plan   Principal Problem:    Shortness of breath    IMPRESSION  TYPE 2 DIABETES MELLITUS WITH HYPERGLYCEMIA  Insulin demand again exacerbated by glucocorticoid  Glucose improved overnight, albeit with correction lispro     RECOMMENDATIONS  Increase glargine to 24 units at bedtime  Change lispro scale to [facility]       Juan Manuel العلي MD    "

## 2024-04-02 NOTE — CARE PLAN
The patient's goals for the shift include      The clinical goals for the shift include pt will remain safe throughout the shift    Over the shift, the patient did make progress toward the following goals.

## 2024-04-02 NOTE — PROGRESS NOTES
Physical Therapy    Physical Therapy Evaluation    Patient Name: Yesy Ayala  MRN: [id number]  Today's Date: 4/2/2024   Time Calculation  Start Time: 0953  Stop Time: 1007  Time Calculation (min): 14 min    Assessment/Plan   PT Assessment  PT Assessment Results: Decreased strength, Decreased endurance, Impaired balance, Decreased mobility  Rehab Prognosis: Good  Evaluation/Treatment Tolerance: Patient tolerated treatment well  Medical Staff Made Aware: Yes  End of Session Communication: Bedside nurse  Assessment Comment: Pt demonstrating deficits in generalized strength, endurance and balance as well as increased pain resulting in impaired functional mobility, gait and self care. Due to the impairments listed above, pt would benefit from skilled physical therapy services in acute care setting as well as additional therapy recommendation at DC listed below  End of Session Patient Position: Bed, 3 rail up, Alarm on  IP OR SWING BED PT PLAN  Inpatient or Swing Bed: Inpatient  PT Plan  Treatment/Interventions: Bed mobility, Transfer training, Gait training, Stair training, Balance training, Neuromuscular re-education, Strengthening, Endurance training, Therapeutic exercise, Therapeutic activity, Home exercise program  PT Plan: Skilled PT  PT Frequency: 3 times per week  PT Discharge Recommendations: Low intensity level of continued care  Equipment Recommended upon Discharge: Wheeled walker  PT - OK to Discharge: Yes (PT POC initiated this date)      Subjective   General Visit Information:  General  Reason for Referral: pt is a 69 y/o F who presented to ED with decline in function. Found to have UTI, sepsis and AMARILYS.  Referred By: Dr. Weaver  Past Medical History Relevant to Rehab: ASHD, COPD, osteoporosis, HTN, PVD, RSV, CKD 3a, elevated troponin  Family/Caregiver Present: No  Co-Treatment: OT  Co-Treatment Reason:  (for maximum patient safety and outcomes)  Prior to Session Communication: Bedside nurse  Patient Position  Received:  (Pt initially standing in room and demonstrating SOB with pt reporting completing morning self care routine in bathroom)  General Comment: agreeable to PT, had been up in bathroom completing ADLs and significantly SOB on arrival  Home Living:  Home Living  Type of Home: Apartment  Lives With: Alone  Home Adaptive Equipment: None  Home Layout:  (2nd level apartment with 13 steps, no elevator, no hand rail)  Bathroom Shower/Tub: Tub/shower unit  Bathroom Toilet: Standard  Bathroom Equipment: None  Prior Level of Function:  Prior Function Per Pt/Caregiver Report  Level of [place]: Independent with ADLs and functional transfers, Independent with homemaking with ambulation  ADL Assistance: Independent  Homemaking Assistance: Independent  Ambulatory Assistance: Independent  Precautions:  Precautions  Medical Precautions: Fall precautions  Vital Signs:  Vital Signs  SpO2: 96 %    Objective   Pain:  Pain Assessment  Pain Assessment: 0-10  Pain Score: 0 - No pain  Cognition:  Cognition  Overall Cognitive Status: Within Functional Limits  Orientation Level: Oriented X4  Insight: Mild  Impulsive: Mildly    General Assessments:  Activity Tolerance  Endurance: Tolerates less than 10 min exercise, no significant change in vital signs    Sensation  Light Touch: No apparent deficits    Coordination  Movements are Fluid and Coordinated: Yes    Static Sitting Balance  Static Sitting-Balance Support: No upper extremity supported, Feet supported  Static Sitting-Level of Assistance: Modified independent    Static Standing Balance  Static Standing-Balance Support: No upper extremity supported  Static Standing-Level of Assistance: Distant supervision  Static Standing-Comment/Number of Minutes: 2 min  Functional Assessments:  Bed Mobility  Bed Mobility: Yes  Bed Mobility 1  Bed Mobility 1: Sitting to supine  Level of Assistance 1: Modified independent    Transfers  Transfer: Yes  Transfer 1  Transfer From 1: Sit  to  Transfer to 1: Stand  Technique 1: Stand to sit  Transfer Level of Assistance 1: Close supervision  Trials/Comments 1: Cues for eccentric control due to uncontrolled descent    Ambulation/Gait Training  Ambulation/Gait Training Performed: Yes  Ambulation/Gait Training 1  Surface 1: Level tile  Device 1: No device  Gait Support Devices: Gait belt  Assistance 1: Contact guard  Quality of Gait 1:  (increased lateral trunk sway with reduced B step length. Tendency to reach for wall HR for UE support. Pt able to complete task without UE support however mildly unsteady. Recommended use of FWW (not currently owned) however pt declining)  Comments/Distance (ft) 1: 130ft  Extremity/Trunk Assessments:  RLE   RLE : Within Functional Limits  LLE   LLE : Within Functional Limits  Outcome Measures:  [facility] Basic Mobility  Turning from your back to your side while in a flat bed without using bedrails: None  Moving from lying on your back to sitting on the side of a flat bed without using bedrails: None  Moving to and from bed to chair (including a wheelchair): A little  Standing up from a chair using your arms (e.g. wheelchair or bedside chair): A little  To walk in hospital room: A little  Climbing 3-5 steps with railing: A little  Basic Mobility - Total Score: 20    Encounter Problems       Encounter Problems (Active)       Balance       LTG - Patient will tolerate standing       Start:  04/02/24    Expected End:  04/16/24       While completing MARQUEZ balance test and scoring >45/56 demonstrating low risk of falls            Mobility       LTG - Patient will navigate 13 steps with LRAD       Start:  04/02/24    Expected End:  04/16/24       SUP         STG - Patient will ambulate       Start:  04/02/24    Expected End:  04/16/24       Mod Ind (LRAD vs no device) >300ft            PT Transfers       STG - Patient will transfer sit to and from stand       Start:  04/02/24    Expected End:  04/16/24       Ind            Pain -  Adult              Education Documentation  Body Mechanics, taught by Joe Davidson, PT at 4/2/2024 10:48 AM.  Learner: Patient  Readiness: Acceptance  Method: Explanation  Response: Verbalizes Understanding    Home Exercise Program, taught by Joe Davidson, PT at 4/2/2024 10:48 AM.  Learner: Patient  Readiness: Acceptance  Method: Explanation  Response: Verbalizes Understanding    Mobility Training, taught by Joe Davidson, PT at 4/2/2024 10:48 AM.  Learner: Patient  Readiness: Acceptance  Method: Explanation  Response: Verbalizes Understanding    Education Comments  No comments found.

## 2024-04-02 NOTE — CARE PLAN
Problem: Diabetes  Goal: Achieve decreasing blood glucose levels by end of shift  Outcome: Progressing  Goal: Increase stability of blood glucose readings by end of shift  Outcome: Progressing  Goal: Decrease in ketones present in urine by end of shift  Outcome: Progressing  Goal: Maintain electrolyte levels within acceptable range throughout shift  Outcome: Progressing  Goal: Maintain glucose levels >70mg/dl to <250mg/dl throughout shift  Outcome: Progressing  Goal: No changes in neurological exam by end of shift  Outcome: Progressing  Goal: Learn about and adhere to nutrition recommendations by end of shift  Outcome: Progressing  Goal: Vital signs within normal range for age by end of shift  Outcome: Progressing  Goal: Increase self care and/or family involovement by end of shift  Outcome: Progressing  Goal: Receive DSME education by end of shift  Outcome: Progressing      [id number]

## 2024-04-03 ENCOUNTER — PATIENT OUTREACH (OUTPATIENT)
Dept: CARE COORDINATION | Facility: CLINIC | Age: 69
End: 2024-04-03
Payer: MEDICARE

## 2024-04-03 LAB
ALBUMIN SERPL BCP-MCNC: 2.5 G/DL (ref 3.4–5)
ALP SERPL-CCNC: 77 U/L (ref 33–136)
ALT SERPL W P-5'-P-CCNC: 166 U/L (ref 7–45)
ANA SER QL HEP2 SUBST: NEGATIVE
ANION GAP SERPL CALC-SCNC: 11 MMOL/L (ref 10–20)
AST SERPL W P-5'-P-CCNC: 64 U/L (ref 9–39)
BACTERIA BLD CULT: NORMAL
BASOPHILS # BLD AUTO: 0.02 X10*3/UL (ref 0–0.1)
BASOPHILS NFR BLD AUTO: 0.1 %
BILIRUB SERPL-MCNC: 0.4 MG/DL (ref 0–1.2)
BUN SERPL-MCNC: 30 MG/DL (ref 6–23)
CALCIUM SERPL-MCNC: 7.7 MG/DL (ref 8.6–10.3)
CHLORIDE SERPL-SCNC: 108 MMOL/L (ref 98–107)
CK SERPL-CCNC: 628 U/L (ref 0–215)
CO2 SERPL-SCNC: 20 MMOL/L (ref 21–32)
CREAT SERPL-MCNC: 2.7 MG/DL (ref 0.5–1.05)
EGFRCR SERPLBLD CKD-EPI 2021: 19 ML/MIN/1.73M*2
EOSINOPHIL # BLD AUTO: 0 X10*3/UL (ref 0–0.7)
EOSINOPHIL NFR BLD AUTO: 0 %
ERYTHROCYTE [DISTWIDTH] IN BLOOD BY AUTOMATED COUNT: 19.8 % (ref 11.5–14.5)
GLUCOSE BLD MANUAL STRIP-MCNC: 251 MG/DL (ref 74–99)
GLUCOSE BLD MANUAL STRIP-MCNC: 330 MG/DL (ref 74–99)
GLUCOSE BLD MANUAL STRIP-MCNC: 346 MG/DL (ref 74–99)
GLUCOSE BLD MANUAL STRIP-MCNC: 353 MG/DL (ref 74–99)
GLUCOSE BLD MANUAL STRIP-MCNC: 390 MG/DL (ref 74–99)
GLUCOSE SERPL-MCNC: 378 MG/DL (ref 74–99)
HCT VFR BLD AUTO: 38.6 % (ref 36–46)
HGB BLD-MCNC: 12.3 G/DL (ref 12–16)
IMM GRANULOCYTES # BLD AUTO: 0.21 X10*3/UL (ref 0–0.7)
IMM GRANULOCYTES NFR BLD AUTO: 1.3 % (ref 0–0.9)
KAPPA LC SERPL-MCNC: 2.97 MG/DL (ref 0.33–1.94)
KAPPA LC/LAMBDA SER: 1.28 {RATIO} (ref 0.26–1.65)
LAMBDA LC SERPL-MCNC: 2.32 MG/DL (ref 0.57–2.63)
LYMPHOCYTES # BLD AUTO: 1.95 X10*3/UL (ref 1.2–4.8)
LYMPHOCYTES NFR BLD AUTO: 11.9 %
MCH RBC QN AUTO: 20.7 PG (ref 26–34)
MCHC RBC AUTO-ENTMCNC: 31.9 G/DL (ref 32–36)
MCV RBC AUTO: 65 FL (ref 80–100)
MONOCYTES # BLD AUTO: 0.92 X10*3/UL (ref 0.1–1)
MONOCYTES NFR BLD AUTO: 5.6 %
NEUTROPHILS # BLD AUTO: 13.22 X10*3/UL (ref 1.2–7.7)
NEUTROPHILS NFR BLD AUTO: 81.1 %
NRBC BLD-RTO: 0 /100 WBCS (ref 0–0)
PLATELET # BLD AUTO: 95 X10*3/UL (ref 150–450)
POTASSIUM SERPL-SCNC: 4.4 MMOL/L (ref 3.5–5.3)
PROT SERPL-MCNC: 4.7 G/DL (ref 6.4–8.2)
PROT SERPL-MCNC: 4.7 G/DL (ref 6.4–8.2)
RBC # BLD AUTO: 5.93 X10*6/UL (ref 4–5.2)
SODIUM SERPL-SCNC: 135 MMOL/L (ref 136–145)
WBC # BLD AUTO: 16.3 X10*3/UL (ref 4.4–11.3)

## 2024-04-03 PROCEDURE — 99232 SBSQ HOSP IP/OBS MODERATE 35: CPT | Performed by: INTERNAL MEDICINE

## 2024-04-03 PROCEDURE — 2500000004 HC RX 250 GENERAL PHARMACY W/ HCPCS (ALT 636 FOR OP/ED): Performed by: INTERNAL MEDICINE

## 2024-04-03 PROCEDURE — 82947 ASSAY GLUCOSE BLOOD QUANT: CPT

## 2024-04-03 PROCEDURE — 36415 COLL VENOUS BLD VENIPUNCTURE: CPT | Performed by: INTERNAL MEDICINE

## 2024-04-03 PROCEDURE — 1100000001 HC PRIVATE ROOM DAILY

## 2024-04-03 PROCEDURE — 94640 AIRWAY INHALATION TREATMENT: CPT | Mod: MUE

## 2024-04-03 PROCEDURE — 80053 COMPREHEN METABOLIC PANEL: CPT | Performed by: INTERNAL MEDICINE

## 2024-04-03 PROCEDURE — 2500000001 HC RX 250 WO HCPCS SELF ADMINISTERED DRUGS (ALT 637 FOR MEDICARE OP): Performed by: NURSE PRACTITIONER

## 2024-04-03 PROCEDURE — 2500000002 HC RX 250 W HCPCS SELF ADMINISTERED DRUGS (ALT 637 FOR MEDICARE OP, ALT 636 FOR OP/ED): Performed by: INTERNAL MEDICINE

## 2024-04-03 PROCEDURE — 85025 COMPLETE CBC W/AUTO DIFF WBC: CPT | Performed by: INTERNAL MEDICINE

## 2024-04-03 PROCEDURE — 2500000004 HC RX 250 GENERAL PHARMACY W/ HCPCS (ALT 636 FOR OP/ED): Performed by: NURSE PRACTITIONER

## 2024-04-03 PROCEDURE — 99233 SBSQ HOSP IP/OBS HIGH 50: CPT | Performed by: INTERNAL MEDICINE

## 2024-04-03 PROCEDURE — 99231 SBSQ HOSP IP/OBS SF/LOW 25: CPT | Performed by: INTERNAL MEDICINE

## 2024-04-03 PROCEDURE — 2500000002 HC RX 250 W HCPCS SELF ADMINISTERED DRUGS (ALT 637 FOR MEDICARE OP, ALT 636 FOR OP/ED): Performed by: PHARMACIST

## 2024-04-03 PROCEDURE — 82550 ASSAY OF CK (CPK): CPT | Performed by: INTERNAL MEDICINE

## 2024-04-03 PROCEDURE — 2500000001 HC RX 250 WO HCPCS SELF ADMINISTERED DRUGS (ALT 637 FOR MEDICARE OP): Performed by: INTERNAL MEDICINE

## 2024-04-03 RX ORDER — DEXTROSE 50 % IN WATER (D50W) INTRAVENOUS SYRINGE
25
Status: DISCONTINUED | OUTPATIENT
Start: 2024-04-03 | End: 2024-04-04 | Stop reason: HOSPADM

## 2024-04-03 RX ORDER — LACTULOSE 10 G/15ML
20 SOLUTION ORAL DAILY PRN
Status: DISCONTINUED | OUTPATIENT
Start: 2024-04-03 | End: 2024-04-04 | Stop reason: HOSPADM

## 2024-04-03 RX ORDER — INSULIN GLARGINE 100 [IU]/ML
30 INJECTION, SOLUTION SUBCUTANEOUS EVERY 24 HOURS
Status: DISCONTINUED | OUTPATIENT
Start: 2024-04-03 | End: 2024-04-04 | Stop reason: HOSPADM

## 2024-04-03 RX ADMIN — BUDESONIDE INHALATION 0.5 MG: 0.5 SUSPENSION RESPIRATORY (INHALATION) at 09:38

## 2024-04-03 RX ADMIN — HEPARIN SODIUM 5000 UNITS: 5000 INJECTION INTRAVENOUS; SUBCUTANEOUS at 13:29

## 2024-04-03 RX ADMIN — HEPARIN SODIUM 5000 UNITS: 5000 INJECTION INTRAVENOUS; SUBCUTANEOUS at 06:00

## 2024-04-03 RX ADMIN — INSULIN LISPRO 10 UNITS: 100 INJECTION, SOLUTION INTRAVENOUS; SUBCUTANEOUS at 10:10

## 2024-04-03 RX ADMIN — INSULIN GLARGINE 30 UNITS: 100 INJECTION, SOLUTION SUBCUTANEOUS at 22:00

## 2024-04-03 RX ADMIN — METOPROLOL TARTRATE 50 MG: 50 TABLET, FILM COATED ORAL at 10:09

## 2024-04-03 RX ADMIN — INSULIN LISPRO 10 UNITS: 100 INJECTION, SOLUTION INTRAVENOUS; SUBCUTANEOUS at 11:54

## 2024-04-03 RX ADMIN — INSULIN LISPRO 6 UNITS: 100 INJECTION, SOLUTION INTRAVENOUS; SUBCUTANEOUS at 17:22

## 2024-04-03 RX ADMIN — BUDESONIDE INHALATION 0.5 MG: 0.5 SUSPENSION RESPIRATORY (INHALATION) at 20:40

## 2024-04-03 RX ADMIN — ASPIRIN 81 MG 81 MG: 81 TABLET ORAL at 10:09

## 2024-04-03 RX ADMIN — SODIUM CHLORIDE, POTASSIUM CHLORIDE, SODIUM LACTATE AND CALCIUM CHLORIDE 50 ML/HR: 600; 310; 30; 20 INJECTION, SOLUTION INTRAVENOUS at 07:51

## 2024-04-03 RX ADMIN — METHYLPREDNISOLONE SODIUM SUCCINATE 30 MG: 40 INJECTION, POWDER, FOR SOLUTION INTRAMUSCULAR; INTRAVENOUS at 00:17

## 2024-04-03 RX ADMIN — FORMOTEROL FUMARATE DIHYDRATE 20 MCG: 20 SOLUTION RESPIRATORY (INHALATION) at 09:38

## 2024-04-03 RX ADMIN — METOPROLOL TARTRATE 50 MG: 50 TABLET, FILM COATED ORAL at 22:00

## 2024-04-03 RX ADMIN — Medication 2000 UNITS: at 10:09

## 2024-04-03 RX ADMIN — HEPARIN SODIUM 5000 UNITS: 5000 INJECTION INTRAVENOUS; SUBCUTANEOUS at 22:00

## 2024-04-03 RX ADMIN — FORMOTEROL FUMARATE DIHYDRATE 20 MCG: 20 SOLUTION RESPIRATORY (INHALATION) at 20:40

## 2024-04-03 RX ADMIN — INSULIN HUMAN 8 UNITS: 100 INJECTION, SUSPENSION SUBCUTANEOUS at 10:10

## 2024-04-03 RX ADMIN — GLIMEPIRIDE 2 MG: 2 TABLET ORAL at 10:08

## 2024-04-03 RX ADMIN — METHYLPREDNISOLONE SODIUM SUCCINATE 30 MG: 40 INJECTION, POWDER, FOR SOLUTION INTRAMUSCULAR; INTRAVENOUS at 12:21

## 2024-04-03 ASSESSMENT — COGNITIVE AND FUNCTIONAL STATUS - GENERAL
DAILY ACTIVITIY SCORE: 22
DRESSING REGULAR UPPER BODY CLOTHING: A LITTLE
MOBILITY SCORE: 24
DRESSING REGULAR LOWER BODY CLOTHING: A LITTLE
DAILY ACTIVITIY SCORE: 22
MOBILITY SCORE: 24
DRESSING REGULAR LOWER BODY CLOTHING: A LITTLE
DRESSING REGULAR UPPER BODY CLOTHING: A LITTLE

## 2024-04-03 ASSESSMENT — PAIN - FUNCTIONAL ASSESSMENT: PAIN_FUNCTIONAL_ASSESSMENT: 0-10

## 2024-04-03 ASSESSMENT — PAIN SCALES - GENERAL
PAINLEVEL_OUTOF10: 0 - NO PAIN
PAINLEVEL_OUTOF10: 0 - NO PAIN

## 2024-04-03 NOTE — CARE PLAN
Problem: Diabetes  Goal: Achieve decreasing blood glucose levels by end of shift  Outcome: Progressing  Goal: Increase stability of blood glucose readings by end of shift  Outcome: Progressing  Goal: Decrease in ketones present in urine by end of shift  Outcome: Progressing  Goal: Maintain electrolyte levels within acceptable range throughout shift  Outcome: Progressing  Goal: Maintain glucose levels >70mg/dl to <250mg/dl throughout shift  Outcome: Progressing  Goal: No changes in neurological exam by end of shift  Outcome: Progressing  Goal: Learn about and adhere to nutrition recommendations by end of shift  Outcome: Progressing  Goal: Vital signs within normal range for age by end of shift  Outcome: Progressing  Goal: Increase self care and/or family involovement by end of shift  Outcome: Progressing  Goal: Receive DSME education by end of shift  Outcome: Progressing     Problem: Pain - Adult  Goal: Verbalizes/displays adequate comfort level or baseline comfort level  Outcome: Progressing     Problem: Safety - Adult  Goal: Free from fall injury  Outcome: Progressing     Problem: Discharge Planning  Goal: Discharge to home or other facility with appropriate resources  Outcome: Progressing     Problem: Chronic Conditions and Co-morbidities  Goal: Patient's chronic conditions and co-morbidity symptoms are monitored and maintained or improved  Outcome: Progressing   The patient's goals for the shift include being able to do ADL's without a lot of exertion.     The clinical goals for the shift include maintaining adequate oxygenation and safety during performance of ADL's.

## 2024-04-03 NOTE — PROGRESS NOTES
"Yesy Ayala is a 68 y.o. female on day 5 of admission presenting with Shortness of breath.    Subjective   Creatinine 2.7, white count improved to 16,000, discontinue IV steroids plan for discharge in the morning.       Objective     Physical Exam  Constitutional:       Appearance: Normal appearance.   HENT:      Head: Normocephalic.      Nose: Nose normal.      Mouth/Throat:      Mouth: Mucous membranes are dry.      Pharynx: Oropharynx is clear.   Cardiovascular:      Rate and Rhythm: Normal rate and regular rhythm.   Pulmonary:      Effort: Pulmonary effort is normal.   Abdominal:      General: Abdomen is flat. Bowel sounds are normal.      Palpations: Abdomen is soft.   Skin:     General: Skin is warm.      Capillary Refill: Capillary refill takes less than 2 seconds.   Neurological:      General: No focal deficit present.      Mental Status: She is alert.         Last Recorded Vitals  Blood pressure 115/61, pulse 61, temperature 36.7 °C (98 °F), resp. rate 18, height 1.473 m (4' 10\"), weight 56.2 kg (124 lb), SpO2 100 %.  Intake/Output last 3 Shifts:  I/O last 3 completed shifts:  In: 240 (4.3 mL/kg) [P.O.:240]  Out: - (0 mL/kg)   Weight: 56.2 kg     Relevant Results  Results for orders placed or performed during the hospital encounter of 03/29/24 (from the past 24 hour(s))   POCT GLUCOSE   Result Value Ref Range    POCT Glucose 287 (H) 74 - 99 mg/dL   Protein, Total   Result Value Ref Range    Total Protein 4.7 (L) 6.4 - 8.2 g/dL   POCT GLUCOSE   Result Value Ref Range    POCT Glucose 342 (H) 74 - 99 mg/dL   POCT GLUCOSE   Result Value Ref Range    POCT Glucose 330 (H) 74 - 99 mg/dL   Comprehensive metabolic panel   Result Value Ref Range    Glucose 378 (H) 74 - 99 mg/dL    Sodium 135 (L) 136 - 145 mmol/L    Potassium 4.4 3.5 - 5.3 mmol/L    Chloride 108 (H) 98 - 107 mmol/L    Bicarbonate 20 (L) 21 - 32 mmol/L    Anion Gap 11 10 - 20 mmol/L    Urea Nitrogen 30 (H) 6 - 23 mg/dL    Creatinine 2.70 (H) 0.50 - " 1.05 mg/dL    eGFR 19 (L) >60 mL/min/1.73m*2    Calcium 7.7 (L) 8.6 - 10.3 mg/dL    Albumin 2.5 (L) 3.4 - 5.0 g/dL    Alkaline Phosphatase 77 33 - 136 U/L    Total Protein 4.7 (L) 6.4 - 8.2 g/dL    AST 64 (H) 9 - 39 U/L    Bilirubin, Total 0.4 0.0 - 1.2 mg/dL     (H) 7 - 45 U/L   CBC and Auto Differential   Result Value Ref Range    WBC 16.3 (H) 4.4 - 11.3 x10*3/uL    nRBC 0.0 0.0 - 0.0 /100 WBCs    RBC 5.93 (H) 4.00 - 5.20 x10*6/uL    Hemoglobin 12.3 12.0 - 16.0 g/dL    Hematocrit 38.6 36.0 - 46.0 %    MCV 65 (L) 80 - 100 fL    MCH 20.7 (L) 26.0 - 34.0 pg    MCHC 31.9 (L) 32.0 - 36.0 g/dL    RDW 19.8 (H) 11.5 - 14.5 %    Platelets 95 (L) 150 - 450 x10*3/uL    Neutrophils % 81.1 40.0 - 80.0 %    Immature Granulocytes %, Automated 1.3 (H) 0.0 - 0.9 %    Lymphocytes % 11.9 13.0 - 44.0 %    Monocytes % 5.6 2.0 - 10.0 %    Eosinophils % 0.0 0.0 - 6.0 %    Basophils % 0.1 0.0 - 2.0 %    Neutrophils Absolute 13.22 (H) 1.20 - 7.70 x10*3/uL    Immature Granulocytes Absolute, Automated 0.21 0.00 - 0.70 x10*3/uL    Lymphocytes Absolute 1.95 1.20 - 4.80 x10*3/uL    Monocytes Absolute 0.92 0.10 - 1.00 x10*3/uL    Eosinophils Absolute 0.00 0.00 - 0.70 x10*3/uL    Basophils Absolute 0.02 0.00 - 0.10 x10*3/uL   Creatine Kinase   Result Value Ref Range    Creatine Kinase 628 (H) 0 - 215 U/L   POCT GLUCOSE   Result Value Ref Range    POCT Glucose 353 (H) 74 - 99 mg/dL   POCT GLUCOSE   Result Value Ref Range    POCT Glucose 390 (H) 74 - 99 mg/dL       Imaging Results  Ct chest:  IMPRESSION:  1. Emphysematous changes are present. Subtle density RIGHT upper lobe  appears to be present which appears to be cavitary. Follow-up PET scan  and/or tissue sampling recommended for further assessment.  2. Scattered micronodules are stable.  3. Nonobstructing left-sided nephrolithiasis.  4. Atherosclerosis of the thoracic aorta and coronary arteries is  present.    Cxr:  IMPRESSION:  No acute cardiopulmonary  abnormality.    Medications:  aspirin, 81 mg, oral, Daily  budesonide, 0.5 mg, nebulization, BID  cholecalciferol, 2,000 Units, oral, Daily  influenza, 0.7 mL, intramuscular, During hospitalization  formoterol, 20 mcg, nebulization, BID  heparin (porcine), 5,000 Units, subcutaneous, q8h SONIDO  insulin glargine, 30 Units, subcutaneous, q24h  insulin lispro, 0-10 Units, subcutaneous, TID with meals  metoprolol tartrate, 50 mg, oral, BID  [Held by provider] rosuvastatin, 10 mg, oral, Nightly       PRN medications: albuterol, dextrose, dextrose, glucagon, ipratropium-albuteroL, lactulose     Assessment/Plan   RUL cavitary lesion  -needs pet ct as OP    2. UTI  -completed antibiotics    3. Hyperglycemia with DMII  -glimepiride dc,  lantus 30 units daily  -on SSI    4. HLD  -rosuvastatin    5. Elevated LFTs  -acute hep panel negative  -holding statin  -lfts trending down    6. AMARILYS on CKDIII  -at baseline around 1.4 currently 2.7 monitor closely continue ivfs renal on board  -ck slightly elevated    7. Non MI troponin elevation  -limited echo normal ef seen by cardiology      DVT Prophylaxis:  Heparin subq    Renita Weaver MD  [facility]

## 2024-04-03 NOTE — CARE PLAN
The patient's goals for the shift include      The clinical goals for the shift include pt will remain safe throughout the shift    Problem: Diabetes  Goal: Achieve decreasing blood glucose levels by end of shift  Outcome: Progressing  Goal: Increase stability of blood glucose readings by end of shift  Outcome: Progressing  Goal: Decrease in ketones present in urine by end of shift  Outcome: Progressing  Goal: Maintain electrolyte levels within acceptable range throughout shift  Outcome: Progressing  Goal: Maintain glucose levels >70mg/dl to <250mg/dl throughout shift  Outcome: Progressing  Goal: No changes in neurological exam by end of shift  Outcome: Progressing  Goal: Learn about and adhere to nutrition recommendations by end of shift  Outcome: Progressing  Goal: Vital signs within normal range for age by end of shift  Outcome: Progressing  Goal: Increase self care and/or family involovement by end of shift  Outcome: Progressing  Goal: Receive DSME education by end of shift  Outcome: Progressing     Problem: Pain - Adult  Goal: Verbalizes/displays adequate comfort level or baseline comfort level  Outcome: Progressing     Problem: Safety - Adult  Goal: Free from fall injury  Outcome: Progressing     Problem: Discharge Planning  Goal: Discharge to home or other facility with appropriate resources  Outcome: Progressing     Problem: Chronic Conditions and Co-morbidities  Goal: Patient's chronic conditions and co-morbidity symptoms are monitored and maintained or improved  Outcome: Progressing

## 2024-04-03 NOTE — DOCUMENTATION CLARIFICATION NOTE
"    PATIENT:               CARMELLA BARBA  ACCT #:                  [id number]  MRN:                       [id number]  :                       1955  ADMIT DATE:       3/29/2024 9:19 AM  DISCH DATE:  RESPONDING PROVIDER #:        [id number]          PROVIDER RESPONSE TEXT:    Sepsis was a differential diagnosis and ruled out after study    CDI QUERY TEXT:    [facility]_CV Sepsis        Instruction:  Based on your assessment of the patient and the clinical information, please provide the requested documentation by clicking on the appropriate radio button and enter any additional information if prompted.    Question: Please clarify the diagnosis of early sepsis      When answering this query, please exercise your independent professional judgment. The fact that a question is being asked, does not imply that any particular answer is desired or expected.    The patient's clinical indicators include:  Clinical Information: 68 year old woman with weakness and SOB. Pt's diagnoses include UTI, COPD exacerbation, hypokalemia, hyperglycemia, and AMARILYS. Pt's PMH includes HTN, CAD, DM, and CKD 3.    Documented Diagnosis: H/P 3/29: CNP [place]  \"Urinary tract infection early sepsis\"    Clinical Indicators: 3/29  -Vital Signs ER: 37.2-103-20    106/75  -WBC: 11.4  -Microbiology Results: n/a  -Neutrophils absolute: 7.78  -Lactic acid: 3.5  -BUN/Creat: 21/2.43  -Blood cultures: no growth 1 day  -Bilirubin: 0.8  -MAP: 85  -[place] Coma Scale ER: 15  -SaO2/FIO2: 99/undocumented FIO2  -Procalcitonin 3/30: 0.18  -Platelets: 70  -Other clinical indicators: Glucose: 493    Treatment:  -NS 1L iv bolus x 2: 3/29  -Ceftriaxone 1gm iv daily: 3/29-ongoing  -LR 75cc/hr: 3/29-    Risk Factors: age, DM, CKD  Options provided:  -- Sepsis was a differential diagnosis and ruled out after study  -- Sepsis with sepsis related organ dysfunction is confirmed as evidenced by, Please specify sepsis associated organ dysfunction below  -- Other - I will add my own " diagnosis  -- Refer to Clinical Documentation Reviewer    Query created by: Janine Sheets on 4/1/2024 9:58 AM      Electronically signed by:  MELINDA HIGGINS MD 4/3/2024 12:59 PM

## 2024-04-03 NOTE — SIGNIFICANT EVENT
Updated pulmonary recommendations:    Contacted by Dr. Weaver for updated pulmonary recommendations as the patient is stable for discharge. Last seen by Dr. Oropeza 3/31. On chart review, I do not see an indication for steroids, ok to discontinue. Regarding the RUL cavitary nodule, this is new since 1/2024 indicating likely inflammatory/infectious etiology, however malignancy cannot be excluded. Recommend follow up non-contrasted CT chest in 6-8 weeks to evaluate for interval resolution. Ms. Ayala follows with Dr. Gurrola. Would recommend following up with him in the next 4-6 weeks and primary care within 2 weeks of discharge.     Radha Velasco, DO

## 2024-04-03 NOTE — PROGRESS NOTES
"Yesy Ayala is a 68 y.o. female on day 5 of admission presenting with Shortness of breath.    Subjective   No new complaint     Scheduled medications  aspirin, 81 mg, oral, Daily  budesonide, 0.5 mg, nebulization, BID  cholecalciferol, 2,000 Units, oral, Daily  influenza, 0.7 mL, intramuscular, During hospitalization  formoterol, 20 mcg, nebulization, BID  glimepiride, 2 mg, oral, Daily with breakfast  heparin (porcine), 5,000 Units, subcutaneous, q8h SONIDO  insulin glargine, 24 Units, subcutaneous, q24h  insulin lispro, 0-10 Units, subcutaneous, TID with meals  lactulose, 20 g, oral, Daily  methylPREDNISolone sodium succinate (PF), 30 mg, intravenous, q12h  metoprolol tartrate, 50 mg, oral, BID  [Held by provider] rosuvastatin, 10 mg, oral, Nightly      Continuous medications  lactated Ringer's, 50 mL/hr, Last Rate: 50 mL/hr (04/02/24 1134)        Objective   Physical Exam  Constitutional:       General: She is not in acute distress.  Neurological:      Mental Status: She is alert.         Last Recorded Vitals  Blood pressure 98/64, pulse 66, temperature 36 °C (96.8 °F), temperature source Temporal, resp. rate 18, height 1.473 m (4' 10\"), weight 56.2 kg (124 lb), SpO2 98 %.  Intake/Output last 3 Shifts:  I/O last 3 completed shifts:  In: 240 (4.3 mL/kg) [P.O.:240]  Out: - (0 mL/kg)   Weight: 56.2 kg     Relevant Results  Results from last 7 days   Lab Units 04/03/24  0640 04/02/24  2023 04/02/24  1543 04/02/24  1155 04/02/24  0752 04/02/24  0534 04/01/24  0810 04/01/24  0617 03/31/24  1157 03/31/24  0608 03/30/24  0819 03/30/24  0704 03/29/24  1723 03/29/24  1111   POCT GLUCOSE mg/dL 330* 342* 287* 222* 136*  --    < >  --    < >  --    < >  --    < >  --    GLUCOSE mg/dL  --   --   --   --   --  189*  --  116*  --  326*  --  121*  --  493*    < > = values in this interval not displayed.     Assessment/Plan   Principal Problem:    Shortness of breath      IMPRESSION  TYPE 2 DIABETES MELLITUS WITH " HYPERGLYCEMIA  Insulin demand again exacerbated by glucocorticoid     RECOMMENDATIONS  NPH 8 units x1 this morning  Increase glargine to 30 units at bedtime  Insulin lispro scale QAC    Juan Manuel العلي MD

## 2024-04-03 NOTE — PROGRESS NOTES
Nephrology Consult Progress Note    Admit Date: 3/29/2024    Interval history:  Trying to eat more    CURRENT MEDICATIONS:    Current Facility-Administered Medications:     albuterol 2.5 mg /3 mL (0.083 %) nebulizer solution 2.5 mg, 2.5 mg, nebulization, q6h PRN, Karsten Yates PharmD    aspirin chewable tablet 81 mg, 81 mg, oral, Daily, REHANA Alcantar, 81 mg at 04/03/24 1009    budesonide (Pulmicort) 0.5 mg/2 mL nebulizer solution 0.5 mg, 0.5 mg, nebulization, BID, Karsten Yates PharmD, 0.5 mg at 04/03/24 0938    cholecalciferol (Vitamin D-3) tablet 2,000 Units, 2,000 Units, oral, Daily, REHANA Alcantar, 2,000 Units at 04/03/24 1009    dextrose 50 % injection 12.5 g, 12.5 g, intravenous, q15 min PRN, REHANA Alcantar    dextrose 50 % injection 25 g, 25 g, intravenous, q15 min PRN, Juan Manuel العلي MD    flu vaccine, quadrivalent, high-dose, preservative free, age 65y+ (FLUZONE), 0.7 mL, intramuscular, During hospitalization, Jarocho Vieyra MD    formoterol (Perforomist) 20 mcg/2 mL nebulizer solution 20 mcg, 20 mcg, nebulization, BID, Karsten Yates PharmD, 20 mcg at 04/03/24 0938    glucagon (Glucagen) injection 1 mg, 1 mg, intramuscular, q15 min PRN, REHANA Alcantar    heparin (porcine) injection 5,000 Units, 5,000 Units, subcutaneous, q8h SONIDO, REHANA Alcantar, 5,000 Units at 04/03/24 0600    insulin glargine (Lantus) injection 30 Units, 30 Units, subcutaneous, q24h, Juan Manuel العلي MD    insulin lispro (HumaLOG) injection 0-10 Units, 0-10 Units, subcutaneous, TID with meals, Juan Manuel العلي MD, 10 Units at 04/03/24 1010    ipratropium-albuteroL (Duo-Neb) 0.5-2.5 mg/3 mL nebulizer solution 3 mL, 3 mL, nebulization, 4x daily PRN, Carter Rincon, APRN-CNP, 3 mL at 03/30/24 2019    lactated Ringer's infusion, 50 mL/hr, intravenous, Continuous, Phoenix Calero MD, Last Rate: 50 mL/hr at 04/03/24 0751, 50 mL/hr at 04/03/24 0751    lactulose 20 gram/30 mL oral solution  "20 g, 20 g, oral, Daily PRN, Renita Weaver MD    methylPREDNISolone sod succinate (SOLU-Medrol) 40 mg/mL injection 30 mg, 30 mg, intravenous, q12h, Renita Weaver MD, 30 mg at 04/03/24 0017    metoprolol tartrate (Lopressor) tablet 50 mg, 50 mg, oral, BID, GAEL Alcantar-CNP, 50 mg at 04/03/24 1009    [Held by provider] rosuvastatin (Crestor) tablet 10 mg, 10 mg, oral, Nightly, GAEL Alcantar-CNP, 10 mg at 03/31/24 2124       Intake/Output Summary (Last 24 hours) at 4/3/2024 1056  Last data filed at 4/3/2024 1000  Gross per 24 hour   Intake 240 ml   Output --   Net 240 ml         PHYSICAL EXAM:  /61 (Patient Position: Lying)   Pulse 61   Temp 36.7 °C (98 °F)   Resp 18   Ht 1.473 m (4' 10\")   Wt 56.2 kg (124 lb)   SpO2 100%   BMI 25.92 kg/m²     Intake/Output Summary (Last 24 hours) at 4/3/2024 1056  Last data filed at 4/3/2024 1000  Gross per 24 hour   Intake 240 ml   Output --   Net 240 ml       Gen: AAO, NAD  Neck: No JVD  Cardiac: RRR  Resp: clear BS  Abd: Soft, non tender, +BS, non distended   Ext: No edema   Neuro: moves 4 ext  Peripheral Pulses: Capillary refill <2secs, strong peripheral pulses.  Skin: no rash    Labs:  Results for orders placed or performed during the hospital encounter of 03/29/24 (from the past 24 hour(s))   POCT GLUCOSE   Result Value Ref Range    POCT Glucose 222 (H) 74 - 99 mg/dL   [place]/Lambda Free Light Chain, Serum   Result Value Ref Range    Ig Kappa Free Light Chain 2.97 (H) 0.33 - 1.94 mg/dL    Ig Lambda Free Light Chain 2.32 0.57 - 2.63 mg/dL    Kappa/Lambda Ratio 1.28 0.26 - 1.65   POCT GLUCOSE   Result Value Ref Range    POCT Glucose 287 (H) 74 - 99 mg/dL   Protein, Total   Result Value Ref Range    Total Protein 4.7 (L) 6.4 - 8.2 g/dL   POCT GLUCOSE   Result Value Ref Range    POCT Glucose 342 (H) 74 - 99 mg/dL   POCT GLUCOSE   Result Value Ref Range    POCT Glucose 330 (H) 74 - 99 mg/dL   Comprehensive metabolic panel   Result Value Ref Range    " Glucose 378 (H) 74 - 99 mg/dL    Sodium 135 (L) 136 - 145 mmol/L    Potassium 4.4 3.5 - 5.3 mmol/L    Chloride 108 (H) 98 - 107 mmol/L    Bicarbonate 20 (L) 21 - 32 mmol/L    Anion Gap 11 10 - 20 mmol/L    Urea Nitrogen 30 (H) 6 - 23 mg/dL    Creatinine 2.70 (H) 0.50 - 1.05 mg/dL    eGFR 19 (L) >60 mL/min/1.73m*2    Calcium 7.7 (L) 8.6 - 10.3 mg/dL    Albumin 2.5 (L) 3.4 - 5.0 g/dL    Alkaline Phosphatase 77 33 - 136 U/L    Total Protein 4.7 (L) 6.4 - 8.2 g/dL    AST 64 (H) 9 - 39 U/L    Bilirubin, Total 0.4 0.0 - 1.2 mg/dL     (H) 7 - 45 U/L   CBC and Auto Differential   Result Value Ref Range    WBC 16.3 (H) 4.4 - 11.3 x10*3/uL    nRBC 0.0 0.0 - 0.0 /100 WBCs    RBC 5.93 (H) 4.00 - 5.20 x10*6/uL    Hemoglobin 12.3 12.0 - 16.0 g/dL    Hematocrit 38.6 36.0 - 46.0 %    MCV 65 (L) 80 - 100 fL    MCH 20.7 (L) 26.0 - 34.0 pg    MCHC 31.9 (L) 32.0 - 36.0 g/dL    RDW 19.8 (H) 11.5 - 14.5 %    Platelets 95 (L) 150 - 450 x10*3/uL    Neutrophils % 81.1 40.0 - 80.0 %    Immature Granulocytes %, Automated 1.3 (H) 0.0 - 0.9 %    Lymphocytes % 11.9 13.0 - 44.0 %    Monocytes % 5.6 2.0 - 10.0 %    Eosinophils % 0.0 0.0 - 6.0 %    Basophils % 0.1 0.0 - 2.0 %    Neutrophils Absolute 13.22 (H) 1.20 - 7.70 x10*3/uL    Immature Granulocytes Absolute, Automated 0.21 0.00 - 0.70 x10*3/uL    Lymphocytes Absolute 1.95 1.20 - 4.80 x10*3/uL    Monocytes Absolute 0.92 0.10 - 1.00 x10*3/uL    Eosinophils Absolute 0.00 0.00 - 0.70 x10*3/uL    Basophils Absolute 0.02 0.00 - 0.10 x10*3/uL   Creatine Kinase   Result Value Ref Range    Creatine Kinase 628 (H) 0 - 215 U/L   POCT GLUCOSE   Result Value Ref Range    POCT Glucose 353 (H) 74 - 99 mg/dL        DATA:   Diagnostic tests reviewed for today's visit:    New labs and imaging     Assessment and Plan:  Pt with h/o CKD stage 3b, AMARILYS 2 months ago, type 2 diabetes, HTN, HLD, peripheral vascular disease, CAD with stent placement,  COPD, coming with generalized weakness, malaise and not  eating for several days. Found to have early sepsis, UTI  Found to have RUL cavitary lesion  - AMARILYS on CKD stage 3b (baseline Scr ~1.5): establish ATN and might take some time to get better, Scr up a bit but stable eGFR since admission  Repeat UA is bland now, no cells, non nephrotic proteinuria PCR 1.7, no eosinophilia   Improving rhabdomyolysis, . Rest work up pending but likely will be normal   Improving LFTs, likely shock liver?  US with L kidney cyst, mildly complex, will need Follow up, no hydro  Euvolemic on gentle ivf  Non oliguric  BP: controlled  Lytes and acid base: acceptable    Hb 12.3, plts mildly decrease but better     PLAN:  -Encouraging po intake, OK to stop ivf and discharge from my stand point, need nephrology Follow up in 2-4 weeks    Will continue to follow.     Signature: Phoenix Calero MD

## 2024-04-04 ENCOUNTER — DOCUMENTATION (OUTPATIENT)
Dept: HOME HEALTH SERVICES | Facility: HOME HEALTH | Age: 69
End: 2024-04-04
Payer: MEDICARE

## 2024-04-04 ENCOUNTER — HOME HEALTH ADMISSION (OUTPATIENT)
Dept: HOME HEALTH SERVICES | Facility: HOME HEALTH | Age: 69
End: 2024-04-04
Payer: MEDICARE

## 2024-04-04 ENCOUNTER — PHARMACY VISIT (OUTPATIENT)
Dept: PHARMACY | Facility: CLINIC | Age: 69
End: 2024-04-04
Payer: COMMERCIAL

## 2024-04-04 VITALS
HEART RATE: 60 BPM | RESPIRATION RATE: 20 BRPM | BODY MASS INDEX: 26.03 KG/M2 | SYSTOLIC BLOOD PRESSURE: 116 MMHG | TEMPERATURE: 97.9 F | WEIGHT: 124 LBS | DIASTOLIC BLOOD PRESSURE: 66 MMHG | OXYGEN SATURATION: 100 % | HEIGHT: 58 IN

## 2024-04-04 LAB
ALBUMIN SERPL BCP-MCNC: 2.5 G/DL (ref 3.4–5)
ALP SERPL-CCNC: 79 U/L (ref 33–136)
ALT SERPL W P-5'-P-CCNC: 147 U/L (ref 7–45)
ANION GAP SERPL CALC-SCNC: 13 MMOL/L (ref 10–20)
AST SERPL W P-5'-P-CCNC: 56 U/L (ref 9–39)
BASOPHILS # BLD AUTO: 0.03 X10*3/UL (ref 0–0.1)
BASOPHILS NFR BLD AUTO: 0.2 %
BILIRUB SERPL-MCNC: 0.5 MG/DL (ref 0–1.2)
BUN SERPL-MCNC: 31 MG/DL (ref 6–23)
CALCIUM SERPL-MCNC: 8.2 MG/DL (ref 8.6–10.3)
CHLORIDE SERPL-SCNC: 110 MMOL/L (ref 98–107)
CO2 SERPL-SCNC: 20 MMOL/L (ref 21–32)
CREAT SERPL-MCNC: 2.6 MG/DL (ref 0.5–1.05)
EGFRCR SERPLBLD CKD-EPI 2021: 20 ML/MIN/1.73M*2
EOSINOPHIL # BLD AUTO: 0 X10*3/UL (ref 0–0.7)
EOSINOPHIL NFR BLD AUTO: 0 %
ERYTHROCYTE [DISTWIDTH] IN BLOOD BY AUTOMATED COUNT: 19.6 % (ref 11.5–14.5)
GLUCOSE BLD MANUAL STRIP-MCNC: 246 MG/DL (ref 74–99)
GLUCOSE BLD MANUAL STRIP-MCNC: 264 MG/DL (ref 74–99)
GLUCOSE SERPL-MCNC: 291 MG/DL (ref 74–99)
HCT VFR BLD AUTO: 38.1 % (ref 36–46)
HGB BLD-MCNC: 12.2 G/DL (ref 12–16)
IMM GRANULOCYTES # BLD AUTO: 0.3 X10*3/UL (ref 0–0.7)
IMM GRANULOCYTES NFR BLD AUTO: 1.7 % (ref 0–0.9)
LYMPHOCYTES # BLD AUTO: 3.56 X10*3/UL (ref 1.2–4.8)
LYMPHOCYTES NFR BLD AUTO: 20.4 %
MCH RBC QN AUTO: 20.6 PG (ref 26–34)
MCHC RBC AUTO-ENTMCNC: 32 G/DL (ref 32–36)
MCV RBC AUTO: 64 FL (ref 80–100)
MONOCYTES # BLD AUTO: 1.25 X10*3/UL (ref 0.1–1)
MONOCYTES NFR BLD AUTO: 7.2 %
NEUTROPHILS # BLD AUTO: 12.31 X10*3/UL (ref 1.2–7.7)
NEUTROPHILS NFR BLD AUTO: 70.5 %
NRBC BLD-RTO: 0.2 /100 WBCS (ref 0–0)
PLATELET # BLD AUTO: 101 X10*3/UL (ref 150–450)
POTASSIUM SERPL-SCNC: 4 MMOL/L (ref 3.5–5.3)
PROT SERPL-MCNC: 4.6 G/DL (ref 6.4–8.2)
RBC # BLD AUTO: 5.93 X10*6/UL (ref 4–5.2)
SODIUM SERPL-SCNC: 139 MMOL/L (ref 136–145)
WBC # BLD AUTO: 17.5 X10*3/UL (ref 4.4–11.3)

## 2024-04-04 PROCEDURE — 2500000002 HC RX 250 W HCPCS SELF ADMINISTERED DRUGS (ALT 637 FOR MEDICARE OP, ALT 636 FOR OP/ED): Performed by: INTERNAL MEDICINE

## 2024-04-04 PROCEDURE — 82947 ASSAY GLUCOSE BLOOD QUANT: CPT

## 2024-04-04 PROCEDURE — 36415 COLL VENOUS BLD VENIPUNCTURE: CPT | Performed by: INTERNAL MEDICINE

## 2024-04-04 PROCEDURE — 94640 AIRWAY INHALATION TREATMENT: CPT

## 2024-04-04 PROCEDURE — 99231 SBSQ HOSP IP/OBS SF/LOW 25: CPT | Performed by: INTERNAL MEDICINE

## 2024-04-04 PROCEDURE — 80053 COMPREHEN METABOLIC PANEL: CPT | Performed by: INTERNAL MEDICINE

## 2024-04-04 PROCEDURE — 2500000002 HC RX 250 W HCPCS SELF ADMINISTERED DRUGS (ALT 637 FOR MEDICARE OP, ALT 636 FOR OP/ED): Performed by: PHARMACIST

## 2024-04-04 PROCEDURE — 2500000002 HC RX 250 W HCPCS SELF ADMINISTERED DRUGS (ALT 637 FOR MEDICARE OP, ALT 636 FOR OP/ED): Performed by: NURSE PRACTITIONER

## 2024-04-04 PROCEDURE — 85025 COMPLETE CBC W/AUTO DIFF WBC: CPT | Performed by: INTERNAL MEDICINE

## 2024-04-04 PROCEDURE — 99239 HOSP IP/OBS DSCHRG MGMT >30: CPT | Performed by: INTERNAL MEDICINE

## 2024-04-04 PROCEDURE — 2500000004 HC RX 250 GENERAL PHARMACY W/ HCPCS (ALT 636 FOR OP/ED): Performed by: NURSE PRACTITIONER

## 2024-04-04 PROCEDURE — RXMED WILLOW AMBULATORY MEDICATION CHARGE

## 2024-04-04 PROCEDURE — 2500000001 HC RX 250 WO HCPCS SELF ADMINISTERED DRUGS (ALT 637 FOR MEDICARE OP): Performed by: NURSE PRACTITIONER

## 2024-04-04 RX ORDER — LANCETS
EACH MISCELLANEOUS
Qty: 300 EACH | Refills: 0 | Status: SHIPPED | OUTPATIENT
Start: 2024-04-04

## 2024-04-04 RX ORDER — ISOPROPYL ALCOHOL 70 ML/100ML
SWAB TOPICAL
Qty: 300 EACH | Refills: 0 | Status: SHIPPED | OUTPATIENT
Start: 2024-04-04

## 2024-04-04 RX ORDER — IBUPROFEN 200 MG
CAPSULE ORAL
Qty: 300 STRIP | Refills: 0 | Status: SHIPPED | OUTPATIENT
Start: 2024-04-04

## 2024-04-04 RX ORDER — DEXTROSE 4 G
TABLET,CHEWABLE ORAL
Qty: 1 EACH | Refills: 0 | Status: SHIPPED | OUTPATIENT
Start: 2024-04-04

## 2024-04-04 RX ORDER — INSULIN GLARGINE 100 [IU]/ML
20 INJECTION, SOLUTION SUBCUTANEOUS EVERY 24 HOURS
Qty: 15 ML | Refills: 0 | Status: SHIPPED | OUTPATIENT
Start: 2024-04-04

## 2024-04-04 RX ORDER — PEN NEEDLE, DIABETIC 30 GX3/16"
NEEDLE, DISPOSABLE MISCELLANEOUS
Qty: 100 EACH | Refills: 0 | OUTPATIENT
Start: 2024-04-04

## 2024-04-04 RX ADMIN — HEPARIN SODIUM 5000 UNITS: 5000 INJECTION INTRAVENOUS; SUBCUTANEOUS at 06:39

## 2024-04-04 RX ADMIN — FORMOTEROL FUMARATE DIHYDRATE 20 MCG: 20 SOLUTION RESPIRATORY (INHALATION) at 08:23

## 2024-04-04 RX ADMIN — INSULIN LISPRO 4 UNITS: 100 INJECTION, SOLUTION INTRAVENOUS; SUBCUTANEOUS at 08:47

## 2024-04-04 RX ADMIN — INSULIN LISPRO 6 UNITS: 100 INJECTION, SOLUTION INTRAVENOUS; SUBCUTANEOUS at 13:52

## 2024-04-04 RX ADMIN — METOPROLOL TARTRATE 50 MG: 50 TABLET, FILM COATED ORAL at 08:44

## 2024-04-04 RX ADMIN — ASPIRIN 81 MG 81 MG: 81 TABLET ORAL at 08:45

## 2024-04-04 RX ADMIN — BUDESONIDE INHALATION 0.5 MG: 0.5 SUSPENSION RESPIRATORY (INHALATION) at 08:23

## 2024-04-04 RX ADMIN — Medication 2000 UNITS: at 08:45

## 2024-04-04 RX ADMIN — IPRATROPIUM BROMIDE AND ALBUTEROL SULFATE 3 ML: 2.5; .5 SOLUTION RESPIRATORY (INHALATION) at 08:23

## 2024-04-04 ASSESSMENT — COGNITIVE AND FUNCTIONAL STATUS - GENERAL
MOBILITY SCORE: 23
DAILY ACTIVITIY SCORE: 22
DRESSING REGULAR UPPER BODY CLOTHING: A LITTLE
DRESSING REGULAR LOWER BODY CLOTHING: A LITTLE
CLIMB 3 TO 5 STEPS WITH RAILING: A LITTLE
MOBILITY SCORE: 24
DAILY ACTIVITIY SCORE: 24

## 2024-04-04 ASSESSMENT — PAIN - FUNCTIONAL ASSESSMENT: PAIN_FUNCTIONAL_ASSESSMENT: 0-10

## 2024-04-04 ASSESSMENT — PAIN SCALES - GENERAL: PAINLEVEL_OUTOF10: 0 - NO PAIN

## 2024-04-04 NOTE — NURSING NOTE
Discharge instructions provided using teach back method. Pt's health related  risk factors discussed with pt. pt educated to look for any worsening sign and symptoms. Pt educated to seek medical attention if experience any medical emergency. Pt aware to follow up with outpatient clinics as scheduled. Home going meds reviewed with pt. Pt verbalized understanding of disposition and discharge instructions. All questions answered to patient's satisfaction and within nursing scope of practice. Vitals stable, IV removed. Pt informed this nurse that insulin is new for her.  Informed Dr Weaver.  Order placed to consult pharmacy for education.  Bedside nurse informed that pt needs glucometer plus supplies and education regarding usage.

## 2024-04-04 NOTE — PROGRESS NOTES
Medication Education     Medication education for Yesy Ayala was provided to the patient  for the following medication(s):  New to insulin therapy       Medication education provided by a Pharmacist:  Dose, frequency, storage How to take and what to do if a dose is missed Proper dose, indication, possible ADRs How the medication works and benefits of taking it Benefits of taking the medication  Necessary labs and/or other monitoring Proper storage of the medication(s) Other discussed with patient how to successfully administer insulin using a pen.     Identified potential barriers to education:  None    Method(s) of Education:  Verbal Demonstration Written materials provided and reviewed    An opportunity to ask questions and receive answers was provided.     Assessment of understanding the patient :  1= partially meets; needs review    Additional Notes (if applicable):  Patient was ready to be discharged when I came to discuss insulin pen and glucometer. Patient seemed overwhelmed by new information, ultimately she was able to demonstrate successfully how to administer insulin with little coaching. She would however benefit from a follow-up education.     Edwina Byrnes, PharmD

## 2024-04-04 NOTE — CARE PLAN
The patient's goals for the shift include      The clinical goals for the shift include pt will maintain comfort this shift      Problem: Diabetes  Goal: Achieve decreasing blood glucose levels by end of shift  Outcome: Progressing  Goal: Increase stability of blood glucose readings by end of shift  Outcome: Progressing  Goal: Decrease in ketones present in urine by end of shift  Outcome: Progressing  Goal: Maintain electrolyte levels within acceptable range throughout shift  Outcome: Progressing  Goal: Maintain glucose levels >70mg/dl to <250mg/dl throughout shift  Outcome: Progressing  Goal: No changes in neurological exam by end of shift  Outcome: Progressing  Goal: Learn about and adhere to nutrition recommendations by end of shift  Outcome: Progressing  Goal: Vital signs within normal range for age by end of shift  Outcome: Progressing  Goal: Increase self care and/or family involovement by end of shift  Outcome: Progressing  Goal: Receive DSME education by end of shift  Outcome: Progressing     Problem: Pain - Adult  Goal: Verbalizes/displays adequate comfort level or baseline comfort level  Outcome: Progressing     Problem: Safety - Adult  Goal: Free from fall injury  Outcome: Progressing     Problem: Discharge Planning  Goal: Discharge to home or other facility with appropriate resources  Outcome: Progressing     Problem: Chronic Conditions and Co-morbidities  Goal: Patient's chronic conditions and co-morbidity symptoms are monitored and maintained or improved  Outcome: Progressing

## 2024-04-04 NOTE — PROGRESS NOTES
Care Coordinator Note:    Plan: Patient cleared by pulm for dc home today. On Room air. Will need to follow up CT chest in 6-8 weeks for Right upper lobe nodule.   Patient tearful and wanting to talk to MD about her general plan. MD notified. SW to follow up with patient on resources for food/hot meals.     Status: inpatient  [facility]  Disposition: Home with [facility] PT OT [facility]- Requested MD to place orders  Barrier:  ADOD: today    Rasheeda Berger TCC      04/04/24 1056   Discharge Planning   Patient expects to be discharged to: Home alone with [facility] PT OT [facility]

## 2024-04-04 NOTE — PROGRESS NOTES
"Yesy Ayala is a 68 y.o. female on day 6 of admission presenting with Shortness of breath.    Subjective   Poor appetite  Patient wants to know when she is going home     Methylprednisolone stopped yesterday    Objective   Physical Exam  Constitutional:       General: She is not in acute distress.  Neurological:      Mental Status: She is alert.         Last Recorded Vitals  Blood pressure 128/81, pulse 52, temperature 36.5 °C (97.7 °F), temperature source Oral, resp. rate 18, height 1.473 m (4' 10\"), weight 56.2 kg (124 lb), SpO2 100 %.  Intake/Output last 3 Shifts:  I/O last 3 completed shifts:  In: 240 (4.3 mL/kg) [P.O.:240]  Out: - (0 mL/kg)   Weight: 56.2 kg     Relevant Results  Results from last 7 days   Lab Units 04/03/24  2139 04/03/24  1447 04/03/24  1119 04/03/24  0757 04/03/24  0711 04/03/24  0640 04/02/24  0752 04/02/24  0534 04/01/24  0810 04/01/24  0617 03/31/24  1157 03/31/24  0608 03/30/24  0819 03/30/24  0704   POCT GLUCOSE mg/dL 346* 251* 390* 353*  --  330*   < >  --    < >  --    < >  --    < >  --    GLUCOSE mg/dL  --   --   --   --  378*  --   --  189*  --  116*  --  326*  --  121*    < > = values in this interval not displayed.     Assessment/Plan   Principal Problem:    Shortness of breath    IMPRESSION  TYPE 2 DIABETES MELLITUS WITH HYPERGLYCEMIA  Insulin demand again exacerbated by glucocorticoid  Methylprednisolone stopped yesterday  Postprandial hyperglycemia yesterday despite limited appetite  No glucose data yet this morning     RECOMMENDATIONS  Insulin glargine 30 units at bedtime  Insulin lispro scale [facility]      Juan Manuel العلي MD    "

## 2024-04-04 NOTE — NURSING NOTE
1345-Diabetes education given by pharmacy patient given diabetes booklet and educated on administration.  1350-Patient given meds to beds.

## 2024-04-04 NOTE — CARE PLAN
Sw met  with  pt  at bedside. Pt presented as  pleasant and  tearful. She said she has limited family  support, is  grieving  her mother  who   three weeks  ago  and is  trying to manage her  estate. Pt  said she is  concerned about  going  home and not having  food/drink in the house.     SW  discussed options including  grocery  delivery  v Meals on Wheels. Pt  said typically she  grocery   shops herself  and  has  the funds to do  so - she just  does not have the energy  to  do  so right  now. Her preference is  to get her  groceries from [employer].     Using  [employer], pt and  SW set up an  account  on the computer   thru  [employer]  and  was  able to  order  groceries  to her  home  today  btwn  3 and  4 pm. Pt expressed  gratitude  for  assistance.     Nurse  informed.     Pt  said she  drove  herself  to  the hospital, is now  ready  for  dc.    Pt  was  given info  on Meals on Wheels if she  wanted to explore that in the future. Pt can also call [facility]  for meals - pt  declined, said she  hopes she  can  continue  grocery shopping.     Sharon Figueroa, MSW, LSW

## 2024-04-04 NOTE — DISCHARGE SUMMARY
Discharge Diagnosis  1.  Dyspnea, unclear etiology  2.  Right upper lobe cavitary lesion, PET/CT as outpatient  3.  Hyperglycemia with type 2 diabetes  4.  Elevated LFTs, improving, holding statin  5.  Non-MI troponin elevation  6.  Acute kidney injury on CKD 3    Issues Requiring Follow-Up  PCP nephrology    Discharge Meds     Your medication list        START taking these medications        Instructions Last Dose Given Next Dose Due   insulin glargine 100 unit/mL injection  Commonly known as: Lantus      Inject 20 Units under the skin once every 24 hours. Take as directed per insulin instructions.              CONTINUE taking these medications        Instructions Last Dose Given Next Dose Due   albuterol 90 mcg/actuation inhaler      Inhale 1 puff every 6 hours if needed for shortness of breath.       aspirin 81 mg capsule           blood-glucose meter misc      Use daily or as directed for monitoring of diabetes.       cholecalciferol 50 MCG (2000 UT) tablet  Commonly known as: Vitamin D-3           metoprolol tartrate 100 mg tablet  Commonly known as: Lopressor      TAKE 1/2 TABLET BY MOUTH TWICE DAILY       Trelegy Ellipta 200-62.5-25 mcg blister with device  Generic drug: fluticasone-umeclidin-vilanter      Inhale 1 puff once daily in the morning. Take before meals.              STOP taking these medications      amLODIPine 10 mg tablet  Commonly known as: Norvasc        glimepiride 2 mg tablet  Commonly known as: Amaryl        ipratropium-albuteroL 0.5-2.5 mg/3 mL nebulizer solution  Commonly known as: Duo-Neb        rosuvastatin 40 mg tablet  Commonly known as: Crestor                  Where to Get Your Medications        These medications were sent to [facility]  [address] , [address], [place] [address]      Hours: 8 AM to 6 PM Mon-Fri, 9 AM to 1 PM Saturday Phone: [phone]   insulin glargine 100 unit/mL injection         Test Results Pending At Discharge  Pending Labs       Order  Current Status    ANCA-Associated Vasculitis Profile (ANCA,MPO,PR3) In process    Serum Protein Electrophoresis + Immunofixation In process    Serum Protein Electrophoresis + Immunofixation In process            Hospital Course   Patient is a 68-year-old female presented to the hospital with complaints of dyspnea on exertion not feeling well.  Patient had minimally elevated troponins was seen by cardiology with a normal echo they felt it was non-MI troponin elevation patient also had a bump in her creatinine from 1.5 now it is down to 2.6 at the time of discharge nephrology stated possibly [facility] can have her follow-up as an outpatient.  She did have mildly elevated LFTs which are now trending down after her statin was held I have continue to hold her statin at discharge can be resumed by her PCP after they repeat LFTs.  Patient was also found to have a UTI was treated.  Patient had a right upper lobe cavitary lesion for which she was seen by pulmonary they recommended outpatient PET/CT will need to follow-up with pulmonary as well.  Patient was also hyperglycemic with type 2 diabetes she she was not taking her glimperide She was discharged on Lantus 20 units a day.  Education was provided.  Patient was stable at the time of discharge.    Time spent more than 30 minutes on discharge      Pertinent Physical Exam At Time of Discharge  Physical Exam  Constitutional:       Appearance: Normal appearance.   HENT:      Head: Normocephalic.      Nose: Nose normal.      Mouth/Throat:      Mouth: Mucous membranes are moist.      Pharynx: Oropharynx is clear.   Cardiovascular:      Rate and Rhythm: Normal rate and regular rhythm.   Pulmonary:      Effort: Pulmonary effort is normal.   Abdominal:      General: Abdomen is flat. Bowel sounds are normal.      Palpations: Abdomen is soft.   Musculoskeletal:         General: Normal range of motion.   Skin:     General: Skin is warm.      Capillary Refill: Capillary refill takes less  than 2 seconds.   Neurological:      General: No focal deficit present.      Mental Status: She is alert.         Outpatient Follow-Up  Future Appointments   Date Time Provider Department Center   5/20/2024  1:00 PM [facility]   8/20/2024  1:00 PM DO YOLI [id number] [facility]         Renita Weaver MD

## 2024-04-04 NOTE — HH CARE COORDINATION
Home Care received a Referral for Nursing, Physical Therapy, and Home Health Aide. We have processed the referral for a Start of Care on 4/6/24.     If you have any questions or concerns, please feel free to contact us at [phone]. Follow the prompts, enter your five digit zip code, and you will be directed to your care team on [facility] 1.

## 2024-04-05 ENCOUNTER — PATIENT OUTREACH (OUTPATIENT)
Dept: CARE COORDINATION | Facility: CLINIC | Age: 69
End: 2024-04-05
Payer: MEDICARE

## 2024-04-05 LAB
ALBUMIN: 2.5 G/DL (ref 3.4–5)
ALPHA 1 GLOBULIN: 0.2 G/DL (ref 0.2–0.6)
ALPHA 2 GLOBULIN: 0.7 G/DL (ref 0.4–1.1)
BETA GLOBULIN: 0.7 G/DL (ref 0.5–1.2)
GAMMA GLOBULIN: 0.6 G/DL (ref 0.5–1.4)
IMMUNOFIXATION COMMENT: ABNORMAL
PATH REVIEW - SERUM IMMUNOFIXATION: ABNORMAL
PATH REVIEW-SERUM PROTEIN ELECTROPHORESIS: ABNORMAL
PROTEIN ELECTROPHORESIS COMMENT: ABNORMAL

## 2024-04-05 NOTE — PROGRESS NOTES
Outreach call to patient to support a smooth transition of care from recent admission. Unable to leave a voicemail message for patient with my contact information.    SEFERINO WebsterN, RN

## 2024-04-22 DIAGNOSIS — J44.9 CHRONIC OBSTRUCTIVE PULMONARY DISEASE, UNSPECIFIED COPD TYPE (MULTI): Primary | ICD-10-CM

## 2024-04-22 DIAGNOSIS — E11.65 TYPE 2 DIABETES MELLITUS WITH HYPERGLYCEMIA, WITHOUT LONG-TERM CURRENT USE OF INSULIN (MULTI): Chronic | ICD-10-CM

## 2024-05-06 ENCOUNTER — PATIENT OUTREACH (OUTPATIENT)
Dept: CARE COORDINATION | Facility: CLINIC | Age: 69
End: 2024-05-06
Payer: MEDICARE

## 2024-05-06 NOTE — PROGRESS NOTES
30 day outreach call completed.  Unable to leave a voicemail message, phone not accepting calls.     SEFERINO WebsterN RN

## 2024-05-14 ENCOUNTER — TELEPHONE (OUTPATIENT)
Dept: PRIMARY CARE | Facility: CLINIC | Age: 69
End: 2024-05-14
Payer: MEDICARE

## 2024-05-14 DIAGNOSIS — J44.1 COPD EXACERBATION (MULTI): Primary | Chronic | ICD-10-CM

## 2024-05-14 RX ORDER — BUDESONIDE AND FORMOTEROL FUMARATE DIHYDRATE 160; 4.5 UG/1; UG/1
2 AEROSOL RESPIRATORY (INHALATION)
Qty: 10.2 G | Refills: 1 | Status: SHIPPED | OUTPATIENT
Start: 2024-05-14 | End: 2025-05-14

## 2024-05-14 NOTE — TELEPHONE ENCOUNTER
Trelegy does not seem to be working.   Pt wants to know if she can go back on her previous inhaler.

## 2024-08-13 DIAGNOSIS — J44.1 COPD EXACERBATION (MULTI): Chronic | ICD-10-CM

## 2024-08-13 RX ORDER — BUDESONIDE AND FORMOTEROL FUMARATE DIHYDRATE 160; 4.5 UG/1; UG/1
2 AEROSOL RESPIRATORY (INHALATION)
Qty: 10.2 G | Refills: 1 | Status: SHIPPED | OUTPATIENT
Start: 2024-08-13 | End: 2025-08-13

## 2024-08-20 ENCOUNTER — APPOINTMENT (OUTPATIENT)
Dept: PULMONOLOGY | Facility: CLINIC | Age: 69
End: 2024-08-20
Payer: MEDICARE

## 2024-08-22 ENCOUNTER — TELEPHONE (OUTPATIENT)
Dept: RESPIRATORY THERAPY | Facility: CLINIC | Age: 69
End: 2024-08-22
Payer: MEDICARE

## 2024-08-22 NOTE — TELEPHONE ENCOUNTER
This patient was left message to schedule PFT prior to clinic appointment on 9/6/24.  Patient was a no show for recent PFT appointment.

## 2024-09-04 ENCOUNTER — APPOINTMENT (OUTPATIENT)
Dept: PRIMARY CARE | Facility: CLINIC | Age: 69
End: 2024-09-04
Payer: MEDICARE

## 2024-09-09 ENCOUNTER — APPOINTMENT (OUTPATIENT)
Dept: PRIMARY CARE | Facility: CLINIC | Age: 69
End: 2024-09-09
Payer: MEDICARE